# Patient Record
Sex: FEMALE | Race: WHITE
[De-identification: names, ages, dates, MRNs, and addresses within clinical notes are randomized per-mention and may not be internally consistent; named-entity substitution may affect disease eponyms.]

---

## 2017-03-21 ENCOUNTER — HOSPITAL ENCOUNTER (OUTPATIENT)
Dept: HOSPITAL 75 - ER | Age: 82
Setting detail: OBSERVATION
LOS: 2 days | Discharge: HOME | End: 2017-03-23
Attending: INTERNAL MEDICINE | Admitting: INTERNAL MEDICINE
Payer: MEDICARE

## 2017-03-21 VITALS — BODY MASS INDEX: 28.82 KG/M2 | HEIGHT: 65 IN | WEIGHT: 173 LBS

## 2017-03-21 VITALS — SYSTOLIC BLOOD PRESSURE: 129 MMHG | DIASTOLIC BLOOD PRESSURE: 70 MMHG

## 2017-03-21 VITALS — DIASTOLIC BLOOD PRESSURE: 57 MMHG | SYSTOLIC BLOOD PRESSURE: 115 MMHG

## 2017-03-21 VITALS — DIASTOLIC BLOOD PRESSURE: 65 MMHG | SYSTOLIC BLOOD PRESSURE: 117 MMHG

## 2017-03-21 VITALS — DIASTOLIC BLOOD PRESSURE: 71 MMHG | SYSTOLIC BLOOD PRESSURE: 119 MMHG

## 2017-03-21 DIAGNOSIS — I10: ICD-10-CM

## 2017-03-21 DIAGNOSIS — E11.9: ICD-10-CM

## 2017-03-21 DIAGNOSIS — E86.0: ICD-10-CM

## 2017-03-21 DIAGNOSIS — J45.909: ICD-10-CM

## 2017-03-21 DIAGNOSIS — J21.9: Primary | ICD-10-CM

## 2017-03-21 DIAGNOSIS — K21.9: ICD-10-CM

## 2017-03-21 DIAGNOSIS — R19.7: ICD-10-CM

## 2017-03-21 LAB
ALBUMIN SERPL-MCNC: 4.2 G/DL (ref 3.2–4.5)
ALT SERPL-CCNC: 14 U/L (ref 0–55)
ANION GAP SERPL CALC-SCNC: 16 MMOL/L (ref 5–14)
AST SERPL-CCNC: 17 U/L (ref 5–34)
BASOPHILS # BLD AUTO: 0 10^3/UL (ref 0–0.1)
BASOPHILS NFR BLD AUTO: 0 % (ref 0–10)
BILIRUB SERPL-MCNC: 0.1 MG/DL (ref 0.1–1)
BILIRUB UR QL STRIP: NEGATIVE
BUN SERPL-MCNC: 40 MG/DL (ref 7–18)
BUN/CREAT SERPL: 20
CALCIUM SERPL-MCNC: 9.4 MG/DL (ref 8.5–10.1)
CHLORIDE SERPL-SCNC: 109 MMOL/L (ref 98–107)
CO2 SERPL-SCNC: 12 MMOL/L (ref 21–32)
CREAT SERPL-MCNC: 1.98 MG/DL (ref 0.6–1.3)
EOSINOPHIL # BLD AUTO: 0.1 10^3/UL (ref 0–0.3)
EOSINOPHIL NFR BLD AUTO: 1 % (ref 0–10)
ERYTHROCYTE [DISTWIDTH] IN BLOOD BY AUTOMATED COUNT: 15.6 % (ref 10–14.5)
GFR SERPLBLD BASED ON 1.73 SQ M-ARVRAT: 24 ML/MIN
GLUCOSE SERPL-MCNC: 162 MG/DL (ref 70–105)
HYALINE CASTS #/AREA URNS LPF: (no result) /LPF
KETONES UR QL STRIP: NEGATIVE
LEUKOCYTE ESTERASE UR QL STRIP: (no result)
LYMPHOCYTES # BLD AUTO: 1 X 10^3 (ref 1–4)
LYMPHOCYTES NFR BLD AUTO: 9 % (ref 12–44)
MAGNESIUM SERPL-MCNC: 2.2 MG/DL (ref 1.8–2.4)
MCH RBC QN AUTO: 29 PG (ref 25–34)
MCHC RBC AUTO-ENTMCNC: 31 G/DL (ref 32–36)
MCV RBC AUTO: 93 FL (ref 80–99)
MONOCYTES # BLD AUTO: 0.9 X 10^3 (ref 0–1)
MONOCYTES NFR BLD AUTO: 8 % (ref 0–12)
NEUTROPHILS # BLD AUTO: 9.5 X 10^3 (ref 1.8–7.8)
NEUTROPHILS NFR BLD AUTO: 83 % (ref 42–75)
NITRITE UR QL STRIP: NEGATIVE
PH UR STRIP: 5 [PH] (ref 5–9)
PLATELET # BLD: 277 10^3/UL (ref 130–400)
PMV BLD AUTO: 10.2 FL (ref 7.4–10.4)
POTASSIUM SERPL-SCNC: 5.2 MMOL/L (ref 3.6–5)
PROT SERPL-MCNC: 8.1 G/DL (ref 6.4–8.2)
PROT UR QL STRIP: (no result)
RBC # BLD AUTO: 3.58 10^6/UL (ref 4.35–5.85)
SODIUM SERPL-SCNC: 137 MMOL/L (ref 135–145)
SP GR UR STRIP: 1.02 (ref 1.02–1.02)
SQUAMOUS #/AREA URNS HPF: (no result) /HPF
UROBILINOGEN UR-MCNC: NORMAL MG/DL
WBC # BLD AUTO: 11.4 10^3/UL (ref 4.3–11)
WBC #/AREA URNS HPF: (no result) /HPF

## 2017-03-21 PROCEDURE — 81000 URINALYSIS NONAUTO W/SCOPE: CPT

## 2017-03-21 PROCEDURE — 85025 COMPLETE CBC W/AUTO DIFF WBC: CPT

## 2017-03-21 PROCEDURE — 82962 GLUCOSE BLOOD TEST: CPT

## 2017-03-21 PROCEDURE — 83605 ASSAY OF LACTIC ACID: CPT

## 2017-03-21 PROCEDURE — 83735 ASSAY OF MAGNESIUM: CPT

## 2017-03-21 PROCEDURE — 96374 THER/PROPH/DIAG INJ IV PUSH: CPT

## 2017-03-21 PROCEDURE — 94640 AIRWAY INHALATION TREATMENT: CPT

## 2017-03-21 PROCEDURE — 71010: CPT

## 2017-03-21 PROCEDURE — 80048 BASIC METABOLIC PNL TOTAL CA: CPT

## 2017-03-21 PROCEDURE — 36415 COLL VENOUS BLD VENIPUNCTURE: CPT

## 2017-03-21 PROCEDURE — 80053 COMPREHEN METABOLIC PANEL: CPT

## 2017-03-21 PROCEDURE — 94760 N-INVAS EAR/PLS OXIMETRY 1: CPT

## 2017-03-21 PROCEDURE — 87040 BLOOD CULTURE FOR BACTERIA: CPT

## 2017-03-21 RX ADMIN — INSULIN ASPART SCH UNIT: 100 INJECTION, SOLUTION INTRAVENOUS; SUBCUTANEOUS at 21:00

## 2017-03-21 RX ADMIN — PROMETHAZINE HYDROCHLORIDE AND CODEINE PHOSPHATE PRN ML: 10; 6.25 SOLUTION ORAL at 22:34

## 2017-03-21 RX ADMIN — SODIUM CHLORIDE SCH MLS/HR: 900 INJECTION, SOLUTION INTRAVENOUS at 15:10

## 2017-03-21 RX ADMIN — SODIUM CHLORIDE SCH MLS/HR: 900 INJECTION, SOLUTION INTRAVENOUS at 20:50

## 2017-03-21 RX ADMIN — SODIUM CHLORIDE SCH MLS/HR: 900 INJECTION, SOLUTION INTRAVENOUS at 08:13

## 2017-03-21 RX ADMIN — ACETAMINOPHEN PRN MG: 500 TABLET ORAL at 13:48

## 2017-03-21 RX ADMIN — ALPRAZOLAM SCH MG: 0.5 TABLET ORAL at 21:00

## 2017-03-21 NOTE — XMS REPORT
[facility]

 Created on: 2016



Richi Sandrita

External Reference #: [id number]

: 1934

Sex: Female



Demographics







 Address  [address] [place]  [address]

 

 Home Phone  [phone]

 

 Preferred Language  Unknown

 

 Marital Status  Unknown

 

 Nondenominational Affiliation  Unknown

 

 Race   or 

 

 Ethnic Group  Not  or 





Author







 Author  YULISA RANGEL

 

 Organization  [employer]

 

 Address  Unknown

 

 Phone  Unavailable







Care Team Providers







 Care Team Member Name  Role  Phone

 

 YULISA RANGEL  CP  Unavailable



                                                                



Allergies

          No Known Allergies                                                   
                                     



Problems

          





 Problem Type  Condition  Code  Onset Dates  Condition Status

 

 Problem  Essential hypertension  I10     Active

 

 Problem  Arthritis  M19.90     Active

 

 Problem  Type 2 diabetes mellitus without complication, without long-term 
current use of insulin  E11.9     Active

 

 Problem  Lumbago with sciatica, left side  M54.42     Active

 

 Problem  Anxiety  F41.9     Active



                                                                               
                                                 



Medications

          





 Medication  Code System  Code  Instructions  Start Date  End Date  Status  
Dosage

 

 Alprazolam  NDC  [address]-10  0.5 MG Orally Three times a day           1 
tablet



                                                                              



Results

          No Known Results                                                     
               



Summary Purpose

          [employer] Submission

## 2017-03-21 NOTE — DIAGNOSTIC IMAGING REPORT
EXAM: CHEST 1 VIEW, AP/PA ONLY



INDICATION: Cough and congestion.



COMPARISON: Chest radiograph 4/12/2015.



FINDINGS: Normal heart size and pulmonary vascularity. No focal

pulmonary opacity, pleural effusion or pneumothorax. Calcified

aorta.



IMPRESSION: No acute cardiopulmonary findings.



Dictated by:



Dictated on workstation # [id number]

## 2017-03-21 NOTE — ED GENERAL
General


Stated Complaint:  VOMITING,DIARRHEA,FEELS HOT ALL OVER


Source of Information:  Patient


Exam Limitations:  No Limitations





History of Present Illness


Time Seen by Provider:  03:00


Initial Comments


Here with report of cough for the last couple of days associated with body 

aches and runny nose.  Onset of diarrhea yesterday morning.  Seen by her 

provider yesterday and prescribed Cefdinir.  She has taken a dose of that.  

Overnight she had increasing cough and diarrhea.  Feels like her skin is on 

fire which is been going on since onset.  Denies blood in her stool.  She 

reports being hot but denies fever.  Reports weakness.


Timing/Duration:  2-3 Days


Severity:  Moderate


Associated Systoms:  No Chest Pain,  CoughNo Fever/Chills,  Nausea/VomitingNo 

Shortness of Air,  Weakness





Allergies and Home Medications


Allergies


Coded Allergies:  


     Diazepam (Unverified  Allergy, Intermediate, RASH, 11/19/10)





Home Medications


Alprazolam 0.5 Mg Tab.sr.24h 1 TAB PO TID (Reported) 


Aspirin 81 Mg Chew 81 MG PO DAILY (Reported) 


Benzonatate 100 Mg Capsule #30 1-2 EACH PO Q4-6HR PRN PRN COUGH 


   Prescribed by: FAISAL MILLER on 4/12/15 2030


Cefuroxime Axetil 500 Mg Tablet #20 1 EACH PO BID 


   Prescribed by: FAISAL MILLER on 4/11/15 1855


Glimepiride 4 Mg Tablet 4 MG PO (Reported) 


Hydrochlorothiazide 25 Mg Tab 25 MG PO DAILY (Reported) 


Lisinopril 5 Mg Tablet 5 MG PO DAILY (Reported) 


Metformin Hcl 500 Mg Tablet 1 EACH PO BID WITH MEALS (Reported) 


Metoprolol Succinate 25 Mg Tab 25 MG PO BID (Reported) 


Tramadol/Acetaminophen 1 Ea Tab 1 TAB PO QID (Reported) 





Constitutional:   see HPI


EENTM:   nose congestionNo throat pain


Respiratory:   coughNo short of breath


Cardiovascular:   no symptoms reported


Gastrointestinal:   diarrhea nausea vomiting


Genitourinary:   no symptoms reported


Musculoskeletal:   no symptoms reported


All Other Systems Reviewed


Negative Unless Noted:  Yes





Past Medical-Social-Family Hx


Patient Social History


Alcohol Use:  Denies Use


Recreational Drug Use:  No


Smoking Status:  Never a Smoker


Recent Foreign Travel:  No


Contact w/Someone Who Travel:  No





Immunizations Up To Date


Date of Pneumonia Vaccine:  Oct 1, 2009





Surgeries


HX Surgeries:  Yes (SKIN CA REMOVAL)


Surgeries:  Eye Surgery, Hysterectomy





Respiratory


Hx Respiratory Disorders:  No





Cardiovascular


Hx Cardiac Disorders:  Yes


Cardiac Disorders:  Hypertension





Neurological


Hx Neurological Disorders:  No





Genitourinary


Hx Genitourinary Disorders:  No





Gastrointestinal


Hx Gastrointestinal Disorders:  No





Musculoskeletal


Hx Musculoskeletal Disorders:  No





Endocrine


Hx Endocrine Disorders:  Yes


Endocrine Disorders:  Diabetes, Non-Insulin dep





HEENT


HX ENT Disorders:  Yes


HEENT Disorders:  Cataract, Glaucoma





Cancer


Hx Cancer:  Yes


Cancer:  Skin





Psychosocial


Hx Psychiatric Problems:  No





Integumentary


HX Skin/Integumentary Disorder:  No





Blood Transfusions


Hx Blood Disorders:  No


Adverse Reaction to a Blood Tr:  No





Reviewed Nursing Assessment


Reviewed/Agree w Nursing PMH:  Yes





Family Medical History


Significant Family History:  No Pertinent Family Hx





Physical Exam


Vital Signs





 Vital Sign - Last 12Hours








 3/21/17





 02:58


 


Temp 97.6


 


Pulse 95


 


Resp 20


 


B/P 114/36


 


Pulse Ox 96


 


O2 Delivery Room Air





Capillary Refill :


General Appearance:   No Apparent Distress WD/WN


HEENT:   PERRL/EOMI Pharynx Normal


Neck:   Non Tender Supple


Respiratory:   No Accessory Muscle Use Other (course cough with sputum)


Cardiovascular:   Regular Rate, Rhythm No Murmur


Gastrointestinal:   Soft Tenderness (mild diffuse)


Back:   Normal Inspection No CVA Tenderness No Vertebral Tenderness


Extremity:   Normal Range of Motion Non Tender No Calf Tenderness


Neurologic/Psychiatric:   Alert Oriented x3 No Motor/Sensory Deficits


Skin:   Normal Color Warm/Dry





Focused Exam


Lactic Acid Level





 Laboratory Tests








Test


  3/21/17


03:44


 


Alanine Aminotransferase


(ALT/SGPT) 14U/L (0-55)  


 


 


Albumin


  4.2G/DL


(3.2-4.5)


 


Alkaline Phosphatase


  48U/L ()


 


 


Anion Gap


  16MMOL/L


(5-14)  H


 


Aspartate Amino Transf


(AST/SGOT) 17U/L (5-34)  


 


 


BUN/Creatinine Ratio 20  


 


Blood Urea Nitrogen


  40MG/DL (7-18)


H


 


Calcium Level


  9.4MG/DL


(8.5-10.1)


 


Carbon Dioxide Level


  12MMOL/L


(21-32)  L


 


Chloride Level


  109MMOL/L


()  H


 


Creatinine


  1.98MG/DL


(0.60-1.30)  H


 


Estimat Glomerular Filtration


Rate 24  


 


 


Glucose Level


  162MG/DL


()  H


 


Lactic Acid Level


  2.48MMOL/L


(0.50-2.00)  *H


 


Magnesium Level


  2.2MG/DL


(1.8-2.4)


 


Potassium Level


  5.2MMOL/L


(3.6-5.0)  H


 


Sodium Level


  137MMOL/L


(135-145)


 


Total Bilirubin


  0.1MG/DL


(0.1-1.0)


 


Total Protein


  8.1G/DL


(6.4-8.2)











Progress/Results/Core Measures


Results/Orders


Lab Results





 Laboratory Tests








Test


  3/21/17


03:44 3/21/17


04:26 Range/Units


 


 


Alanine Aminotransferase


(ALT/SGPT) 14 


  


  0-55  U/L


 


 


Albumin 4.2   3.2-4.5  G/DL


 


Alkaline Phosphatase 48     U/L


 


Anion Gap 16 H  5-14  MMOL/L


 


Aspartate Amino Transf


(AST/SGOT) 17 


  


  5-34  U/L


 


 


BUN/Creatinine Ratio 20    


 


Basophils # (Auto)


  0.0 


  


  0.0-0.1


10^3/uL


 


Basophils (%) (Auto) 0   0-10  %


 


Blood Urea Nitrogen 40 H  7-18  MG/DL


 


Calcium Level 9.4   8.5-10.1  MG/DL


 


Carbon Dioxide Level 12 L  21-32  MMOL/L


 


Chloride Level 109 H    MMOL/L


 


Creatinine


  1.98 H


  


  0.60-1.30


MG/DL


 


Eosinophils # (Auto)


  0.1 


  


  0.0-0.3


10^3/uL


 


Eosinophils (%) (Auto) 1   0-10  %


 


Estimat Glomerular Filtration


Rate 24 


  


   


 


 


Glucose Level 162 H    MG/DL


 


Hematocrit 33 L  35-52  %


 


Hemoglobin 10.3 L  11.5-16.0  G/DL


 


Lactic Acid Level


  2.48 *H


  


  0.50-2.00


MMOL/L


 


Lymphocytes # (Auto) 1.0   1.0-4.0  X 10^3


 


Lymphocytes (%) (Auto) 9 L  12-44  %


 


Magnesium Level 2.2   1.8-2.4  MG/DL


 


Mean Corpuscular Hemoglobin 29   25-34  PG


 


Mean Corpuscular Hemoglobin


Concent 31 L


  


  32-36  G/DL


 


 


Mean Corpuscular Volume 93   80-99  FL


 


Mean Platelet Volume 10.2   7.4-10.4  FL


 


Monocytes # (Auto) 0.9   0.0-1.0  X 10^3


 


Monocytes (%) (Auto) 8   0-12  %


 


Neutrophils # (Auto) 9.5 H  1.8-7.8  X 10^3


 


Neutrophils (%) (Auto) 83 H  42-75  %


 


Platelet Count


  277 


  


  130-400


10^3/uL


 


Potassium Level 5.2 H  3.6-5.0  MMOL/L


 


Red Blood Count


  3.58 L


  


  4.35-5.85


10^6/uL


 


Red Cell Distribution Width 15.6 H  10.0-14.5  %


 


Sodium Level 137   135-145  MMOL/L


 


Total Bilirubin 0.1   0.1-1.0  MG/DL


 


Total Protein 8.1   6.4-8.2  G/DL


 


White Blood Count


  11.4 H


  


  4.3-11.0


10^3/uL


 


Urine Bacteria  TRACE   /HPF


 


Urine Bilirubin  NEGATIVE  NEGATIVE  


 


Urine Casts  PRESENT   /LPF


 


Urine Clarity  CLEAR   


 


Urine Color  YELLOW   


 


Urine Crystals  NONE   /LPF


 


Urine Culture Indicated  NO   


 


Urine Glucose (UA)  NEGATIVE  NEGATIVE  


 


Urine Hyaline Casts  5-10 H  /LPF


 


Urine Ketones  NEGATIVE  NEGATIVE  


 


Urine Leukocyte Esterase  1+ H NEGATIVE  


 


Urine Mucus  MODERATE H  /LPF


 


Urine Nitrite  NEGATIVE  NEGATIVE  


 


Urine Protein  2+ H NEGATIVE  


 


Urine RBC  0-2   /HPF


 


Urine RBC (Auto)  1+ H NEGATIVE  


 


Urine Specific Gravity  1.020  1.016-1.022  


 


Urine Squamous Epithelial


Cells 


  0-2 


   /HPF


 


 


Urine Urobilinogen  NORMAL  NORMAL  MG/DL


 


Urine WBC  RARE   /HPF


 


Urine pH  5  5-9  








My Orders





 Orders-SHERIN LINTON MD


Cbc With Automated Diff (3/21/17 03:17)


Comprehensive Metabolic Panel (3/21/17 03:17)


Magnesium (3/21/17 03:17)


Ua Culture If Indicated (3/21/17 03:17)


Ondansetron Injection (Zofran Injectio (3/21/17 03:30)


Ns Iv 1000 Ml (Sodium Chloride 0.9%) (3/21/17 03:17)


Saline Lock/Iv-Start (3/21/17 03:17)


Lactic Acid Analyzer (3/21/17 03:17)


Blood Culture (3/21/17 03:17)


Chest 1 View, Ap/Pa Only (3/21/17 04:02)


Promethazine/ Codeine Syrup (Phenergan W (3/21/17 05:15)





Medications Given in ED





 Current Medications








 Medications  Dose


 Ordered  Sig/Fred


 Route  Start Time


 Stop Time Status Last Admin


Dose Admin


 


 Ondansetron HCl  4 mg  ONCE  ONCE


 IVP  3/21/17 03:30


 3/21/17 03:31 DC 3/21/17 03:34


4 MG








Vital Signs/I&O





 Vital Sign - Last 12Hours








 3/21/17





 02:58


 


Temp 97.6


 


Pulse 95


 


Resp 20


 


B/P 114/36


 


Pulse Ox 96


 


O2 Delivery Room Air








Progress Note :  


Progress Note


Seen and evaluated.  IV, labs, UA, chest x-ray, normal saline 1 L bolus and 

Zofran 4 mg IV ordered.  Monitor patient.  0500: Labs, x-ray and UA are 

reviewed.  Patient does have elevated lactic acid but there is no obvious 

source of infection.  Chest x-ray is negative and UA is negative.  She does 

have diarrhea and cough.  I do believe the lactic acid is related to volume 

depletion/dehydration from diarrhea and not sepsis from bacterial infection.  

Likely viral bronchitis and viral diarrhea.  This was discussed with Dr. OSBORNE 

who agrees.  She has had 1 L of normal saline and we will continue this 

inpatient with normal saline at 150 mL an hour.  Findings concerns discussed 

with patient and family who agree with plan.  Admit, observation status.  

Phenergan with codeine cough syrup 1 teaspoon by mouth given.





Diagnostic Imaging





   Diagonstic Imaging:  Xray


   Plain Films/CT/US/NM/MRI:  chest


Comments


No acute findings





Departure


Communication


Time/Spoke to Admitting Phy:  05:00





Impression


Impression:  


 Primary Impression:  


 Diarrhea


 Qualified Code:  R19.7 - Diarrhea, unspecified


 Additional Impressions:  


 Bronchitis


 Volume depletion


Disposition:  09 ADMITTED AS INPATIENT


Condition:  Stable


Decision to Admit Reason:  Admit from ER (General)


Decision to Admit/Date:  Mar 21, 2017


Time/Decision to Admit Time:  05:00





Departure-Patient Inst.


Referrals:  


[facility] (PCP/Family)


Primary Care Physician








SHERIN LINTON MD Mar 21, 2017 03:28

## 2017-03-21 NOTE — HISTORY & PHYSICAL-HOSPITALIST
HPI


History of Present Illness:


HPI/Chief Complaint


Mrs. Billy is an 82-year who reported a three-day history of cough and 

congestion.  This then progre to watery diarrhea without reported bright red 

blood or diarrhea. she saw her doctor who prescribed Cefdinir.following this 

she felt as though her skin was on fire with increasing weakness and shortness 

of breath.subsequently presented to the emergency room.  She denied shaking 

chills or fever and denied Sputem production.


Date Seen


3/21/17


Attending Physician


Giovanni Romero MD


PCP


[facility]


Referring Physician





Date of Admission


Mar 21, 2017 at 05:00





Home Medications & Allergies


Home Medications


Reviewed patient Home Medication Reconciliation Form





Allergies





 Allergies


Coded Allergies


  diazepam (Unverified Allergy, Intermediate, RASH, 11/19/10)








Past Medical-Social-Family Hx


Patient Social History


Alcohol Use:  Denies Use


Recreational Drug Use:  No


Smoking Status:  Never a Smoker


Physical Abuse Screen:  No


Sexual Abuse:  No


Recent Foreign Travel:  No


Contact w/other who traveled:  No


Recent Hopitalizations:  No


Recent Infectious Disease Expo:  No





Immunizations Up To Date


Date of Pneumonia Vaccine:  Oct 22, 2017





Seasonal Allergies


Seasonal Allergies:  No





Surgeries


HX Surgeries:  Yes (SKIN CA REMOVAL)


Surgeries:  Eye Surgery, Hysterectomy





Respiratory


Hx Respiratory Disorders:  No





Cardiovascular


Hx Cardiovascular Disorders:  Yes


Cardiac Disorders:  Hypertension





Neurological


Hx Neurological Disorders:  No





Genitourinary


Hx Genitourinary Disorders:  No





Gastrointestinal


Hx Gastrointestinal Disorders:  No


Gastrointestinal Disorders:  Gastroesophageal Reflux





Musculoskeletal


Hx Musculoskeletal Disorders:  No





Endocrine


Hx Endocrine Disorders:  Yes


Endocrine Disorders:  Diabetes, Non-Insulin dep





HEENT


HX ENT Disorders:  Yes


HEENT Disorders:  Cataract, Glaucoma





Cancer


Hx Cancer:  Yes


Cancer:  Skin





Psychosocial


Hx Psychiatric Problems:  No





Integumentary


HX Skin/Integumentary Disorder:  No





Blood Transfusions


Hx Blood Disorders:  No


Adverse Reaction to a Blood Tr:  No





Reviewed Nursing Assessment


Reviewed/Agree w Nursing PMH:  Yes





Family Medical History


Significant Family History:  No Pertinent Family Hx


Family Hx:  


Patient reports no known family medical history.





Review of Systems


Constitutional:  No chills, No diaphoresis, No dizziness, No fever,  weakness


Respiratory:   cough dyspnea on exertion short of breath wheezing


Gastrointestinal:   abdominal pain (And left upper quadrant from coughing) 

diarrhea loss of appetite





Physical Exam


Physical Exam


Vital Signs





 Vital Sign - Last 12Hours








 3/21/17





 02:58


 


Temp 97.6


 


Pulse 95


 


Resp 20


 


B/P 114/36


 


Pulse Ox 96


 


O2 Delivery Room Air





Capillary Refill : Less Than 3 Seconds


General Appearance:   Anxious Chronically ill Mild Distress


HEENT:   Pale Conjunctivae (L) Pale Conjunctivae (R) Pharyngeal Erythema


Respiratory:   No Accessory Muscle Use Wheezing (Mild expiratory  heard 

posteriorly anterior chest was clear no rales or rhonchi were noted.)


Cardiovascular:   Regular Rate, Rhythm No Edema No Gallop No JVD No Murmur 

Normal Peripheral Pulses


Gastrointestinal:   Normal Bowel Sounds No Organomegaly No Pulsatile Mass Soft 

Other (Mild epigastricTenderness no rebound or guarding)


Extremity:   Non Tender No Calf Tenderness No Pedal Edema


Skin:   Pallor





Results


Results/Procedures


Lab


Laboratory Tests


3/21/17 03:44














Assessment/Plan


Admission Diagnosis


1.  Likely viral bronchitis with reactive airways in addition to viral 

gastroenteritis with mild dehydration.  Bronchodilator therapy and IV hydration 

have been initiated.


2.  Type II diabetes  mellitus we will hold oral therapy for now and monitor.


3.  Hypertension hold antihypertensive medication and resume when blood 

pressure becomes elevated..





Clinical Quality Measures


DVT/VTE Risk/Contraindication:


Risk Factor Score Per Nursin


RFS Level Per Nursing on Admit:  4+=Very High








ADITYA STAUFFER MD Mar 21, 2017 20:13

## 2017-03-22 VITALS — DIASTOLIC BLOOD PRESSURE: 69 MMHG | SYSTOLIC BLOOD PRESSURE: 126 MMHG

## 2017-03-22 VITALS — SYSTOLIC BLOOD PRESSURE: 100 MMHG | DIASTOLIC BLOOD PRESSURE: 59 MMHG

## 2017-03-22 VITALS — DIASTOLIC BLOOD PRESSURE: 67 MMHG | SYSTOLIC BLOOD PRESSURE: 122 MMHG

## 2017-03-22 VITALS — SYSTOLIC BLOOD PRESSURE: 137 MMHG | DIASTOLIC BLOOD PRESSURE: 70 MMHG

## 2017-03-22 VITALS — DIASTOLIC BLOOD PRESSURE: 65 MMHG | SYSTOLIC BLOOD PRESSURE: 122 MMHG

## 2017-03-22 VITALS — SYSTOLIC BLOOD PRESSURE: 146 MMHG | DIASTOLIC BLOOD PRESSURE: 66 MMHG

## 2017-03-22 VITALS — DIASTOLIC BLOOD PRESSURE: 66 MMHG | SYSTOLIC BLOOD PRESSURE: 121 MMHG

## 2017-03-22 LAB
ANION GAP SERPL CALC-SCNC: 7 MMOL/L (ref 5–14)
BASOPHILS # BLD AUTO: 0 10^3/UL (ref 0–0.1)
BASOPHILS NFR BLD AUTO: 0 % (ref 0–10)
BUN SERPL-MCNC: 26 MG/DL (ref 7–18)
BUN/CREAT SERPL: 19
CALCIUM SERPL-MCNC: 7.7 MG/DL (ref 8.5–10.1)
CHLORIDE SERPL-SCNC: 116 MMOL/L (ref 98–107)
CO2 SERPL-SCNC: 18 MMOL/L (ref 21–32)
CREAT SERPL-MCNC: 1.34 MG/DL (ref 0.6–1.3)
EOSINOPHIL # BLD AUTO: 0.1 10^3/UL (ref 0–0.3)
EOSINOPHIL NFR BLD AUTO: 1 % (ref 0–10)
ERYTHROCYTE [DISTWIDTH] IN BLOOD BY AUTOMATED COUNT: 15.7 % (ref 10–14.5)
GFR SERPLBLD BASED ON 1.73 SQ M-ARVRAT: 38 ML/MIN
GLUCOSE SERPL-MCNC: 159 MG/DL (ref 70–105)
LYMPHOCYTES # BLD AUTO: 2 X 10^3 (ref 1–4)
LYMPHOCYTES NFR BLD AUTO: 23 % (ref 12–44)
MCH RBC QN AUTO: 29 PG (ref 25–34)
MCHC RBC AUTO-ENTMCNC: 32 G/DL (ref 32–36)
MCV RBC AUTO: 93 FL (ref 80–99)
MONOCYTES # BLD AUTO: 0.6 X 10^3 (ref 0–1)
MONOCYTES NFR BLD AUTO: 7 % (ref 0–12)
NEUTROPHILS # BLD AUTO: 6 X 10^3 (ref 1.8–7.8)
NEUTROPHILS NFR BLD AUTO: 69 % (ref 42–75)
PLATELET # BLD: 223 10^3/UL (ref 130–400)
PMV BLD AUTO: 9.7 FL (ref 7.4–10.4)
POTASSIUM SERPL-SCNC: 4.2 MMOL/L (ref 3.6–5)
RBC # BLD AUTO: 2.91 10^6/UL (ref 4.35–5.85)
SODIUM SERPL-SCNC: 141 MMOL/L (ref 135–145)
WBC # BLD AUTO: 8.7 10^3/UL (ref 4.3–11)

## 2017-03-22 RX ADMIN — PANTOPRAZOLE SODIUM SCH MG: 40 TABLET, DELAYED RELEASE ORAL at 06:52

## 2017-03-22 RX ADMIN — SODIUM CHLORIDE SCH MLS/HR: 900 INJECTION, SOLUTION INTRAVENOUS at 14:28

## 2017-03-22 RX ADMIN — INSULIN ASPART SCH UNIT: 100 INJECTION, SOLUTION INTRAVENOUS; SUBCUTANEOUS at 16:00

## 2017-03-22 RX ADMIN — IPRATROPIUM BROMIDE AND ALBUTEROL SULFATE SCH ML: .5; 3 SOLUTION RESPIRATORY (INHALATION) at 11:01

## 2017-03-22 RX ADMIN — IPRATROPIUM BROMIDE AND ALBUTEROL SULFATE SCH ML: .5; 3 SOLUTION RESPIRATORY (INHALATION) at 06:51

## 2017-03-22 RX ADMIN — INSULIN ASPART SCH UNIT: 100 INJECTION, SOLUTION INTRAVENOUS; SUBCUTANEOUS at 20:32

## 2017-03-22 RX ADMIN — SODIUM CHLORIDE SCH MLS/HR: 900 INJECTION, SOLUTION INTRAVENOUS at 06:47

## 2017-03-22 RX ADMIN — SODIUM CHLORIDE SCH MLS/HR: 900 INJECTION, SOLUTION INTRAVENOUS at 00:15

## 2017-03-22 RX ADMIN — PROMETHAZINE HYDROCHLORIDE AND CODEINE PHOSPHATE PRN ML: 10; 6.25 SOLUTION ORAL at 15:08

## 2017-03-22 RX ADMIN — ALPRAZOLAM SCH MG: 0.5 TABLET ORAL at 20:01

## 2017-03-22 RX ADMIN — ALPRAZOLAM SCH MG: 0.5 TABLET ORAL at 11:23

## 2017-03-22 RX ADMIN — INSULIN ASPART SCH UNIT: 100 INJECTION, SOLUTION INTRAVENOUS; SUBCUTANEOUS at 05:23

## 2017-03-22 RX ADMIN — ALPRAZOLAM SCH MG: 0.5 TABLET ORAL at 09:00

## 2017-03-22 RX ADMIN — INSULIN ASPART SCH UNIT: 100 INJECTION, SOLUTION INTRAVENOUS; SUBCUTANEOUS at 11:20

## 2017-03-22 RX ADMIN — IPRATROPIUM BROMIDE AND ALBUTEROL SULFATE SCH ML: .5; 3 SOLUTION RESPIRATORY (INHALATION) at 15:31

## 2017-03-22 RX ADMIN — IPRATROPIUM BROMIDE AND ALBUTEROL SULFATE SCH ML: .5; 3 SOLUTION RESPIRATORY (INHALATION) at 18:55

## 2017-03-22 NOTE — PROGRESS NOTE-HOSPITALIST
Subjective


HPI/CC On Admission


Mrs. Billy is an 82-year who reported a three-day history of cough and 

congestion.  This then progre to watery diarrhea without reported bright red 

blood or diarrhea. she saw her doctor who prescribed Cefdinir.following this 

she felt as though her skin was on fire with increasing weakness and shortness 

of breath.subsequently presented to the emergency room.  She denied shaking 

chills or fever and denied Sputem production.


Date Seen


3/22/17


Subjective/Events-last exam


After breathing rx last night feeling better with decreased sob. No chill fever 

or diarrhea reported.





Objective


Exam


Vital Signs





Vital Sign - Last 12Hours








 3/20/17 3/21/17





 21:20 02:58


 


Temp  97.6


 


Pulse  95


 


Resp  20


 


B/P (MAP)  114/36


 


Pulse Ox 95 


 


O2 Delivery Room Air 





Capillary Refill : Less Than 3 Seconds


General Appearance:  No Apparent Distress, Chronically ill


Respiratory:  Chest Non Tender, No Accessory Muscle Use, No Respiratory Distress

, Wheezing (mild expiratory )


Cardiovascular:  Regular Rate, Rhythm, No Edema, No Gallop, No JVD, No Murmur, 

Normal Peripheral Pulses


Extremity:  No Pedal Edema





Results/Procedures


Lab








Assessment/Plan


Assessment and Plan


Assess & Plan/Chief Complaint


1.  Acute bronchiolitis likely due to viral infection improving patient set up 

for discharge later today she was codeine-based cough syrup which is working 

well for the past without side effects.











ADITYA STAUFFER MD Mar 22, 2017 20:46

## 2017-03-23 VITALS — SYSTOLIC BLOOD PRESSURE: 130 MMHG | DIASTOLIC BLOOD PRESSURE: 60 MMHG

## 2017-03-23 VITALS — SYSTOLIC BLOOD PRESSURE: 144 MMHG | DIASTOLIC BLOOD PRESSURE: 77 MMHG

## 2017-03-23 VITALS — DIASTOLIC BLOOD PRESSURE: 88 MMHG | SYSTOLIC BLOOD PRESSURE: 122 MMHG

## 2017-03-23 RX ADMIN — IPRATROPIUM BROMIDE AND ALBUTEROL SULFATE SCH ML: .5; 3 SOLUTION RESPIRATORY (INHALATION) at 11:23

## 2017-03-23 RX ADMIN — PROMETHAZINE HYDROCHLORIDE AND CODEINE PHOSPHATE PRN ML: 10; 6.25 SOLUTION ORAL at 06:07

## 2017-03-23 RX ADMIN — ALPRAZOLAM SCH MG: 0.5 TABLET ORAL at 09:24

## 2017-03-23 RX ADMIN — INSULIN ASPART SCH UNIT: 100 INJECTION, SOLUTION INTRAVENOUS; SUBCUTANEOUS at 06:00

## 2017-03-23 RX ADMIN — IPRATROPIUM BROMIDE AND ALBUTEROL SULFATE SCH ML: .5; 3 SOLUTION RESPIRATORY (INHALATION) at 07:38

## 2017-03-23 RX ADMIN — INSULIN ASPART SCH UNIT: 100 INJECTION, SOLUTION INTRAVENOUS; SUBCUTANEOUS at 16:00

## 2017-03-23 RX ADMIN — INSULIN ASPART SCH UNIT: 100 INJECTION, SOLUTION INTRAVENOUS; SUBCUTANEOUS at 10:54

## 2017-03-23 RX ADMIN — ALPRAZOLAM SCH MG: 0.5 TABLET ORAL at 12:23

## 2017-03-23 RX ADMIN — PANTOPRAZOLE SODIUM SCH MG: 40 TABLET, DELAYED RELEASE ORAL at 06:08

## 2017-03-23 RX ADMIN — ACETAMINOPHEN PRN MG: 500 TABLET ORAL at 07:41

## 2017-03-23 NOTE — DISCHARGE SUMMARY-HOSPITALIST
Diagnosis/Chief Complaint


Date of Admission


Mar 21, 2017 at 07:00


Date of Discharge





Discharge Date:  Mar 23, 2017


Admission Diagnosis


1.  Likely viral bronchitis with reactive airways in addition to viral 

gastroenteritis with mild dehydration.  Bronchodilator therapy and IV hydration 

have been initiated.


2.  Type II diabetes  mellitus we will hold oral therapy for now and monitor.


3.  Hypertension hold antihypertensive medication and resume when blood 

pressure becomes elevated..





Discharge Diagnosis


1.  Acute bronchiolitis likely due to viral infection improving patient set up 

for discharge later today she was codeine-based cough syrup which is working 

well for the past without side effects.








Reason Hospital Visit/Course


Mrs. Stoddard is an 82-year who reported a three-day history of cough and 

congestion.  This then progre to watery diarrhea without reported bright red 

blood or diarrhea. she saw her doctor who prescribed Cefdinir.following this 

she felt as though her skin was on fire with increasing weakness and shortness 

of breath.subsequently presented to the emergency room.  She denied shaking 

chills or fever and denied Sputum production.  IV fluids and anti-medic therapy 

was initiated.  Patient was feeling better by the morning and creatinine level 

was down from the 1.8 range to 1.3.  Her hemoglobin with rehydration and also 

decreased from little over 10-8.5.  Stools remain light in color with 

resolution of diarrhea.  Appetite returned to normal.  Her cough persisted and 

she did request codeine-based cough syrup on discharge which she has tolerated 

in the past without nausea or constipation problems.  Despite return of good by 

mouth intake on a 1500-calorie ADA diet blood sugars remained predominantly in 

the 120-150 range off of metformin and glimepiride.  On her discharge metformin 

at 500 mg twice a day was resumed and she was told to hold her glimepiride.  

Other medications were resumed as per below in addition Robitussin-AC elixir 5-

10 mL every 4 when necessary.  She was advised to follow-up with Dr. Hsu in 1

-2 weeks and to repeat a CBC at that time.  Discussed the fact that she did 

have what appeared to be likely anemia of chronic disease.  If this is not 

resolving she will require further workup.  Discussed the importance of a 

balanced diet in layman's terms.





Discharge Summary


Discharge Physical Examination


Allergies:  


Coded Allergies:  


     diazepam (Unverified  Allergy, Intermediate, RASH, 11/19/10)


Vitals & I&Os





Vital Signs








  Date Time  Temp Pulse Resp B/P (MAP) Pulse Ox O2 Delivery O2 Flow Rate FiO2


 


3/23/17 16:00 96.9 94 16 130/60 97 Room Air  











Hospital Course


Labs (last 24 hrs)


Laboratory Tests


3/22/17 20:21: Glucometer 182H


3/23/17 05:12: Glucometer 141H


3/23/17 10:43: Glucometer 171H


3/23/17 15:58: Glucometer 132H





Microbiology


3/21/17 Blood Culture - Preliminary, Resulted


          No growth





Pending Labs


Laboratory Tests


3/23/17 10:43: Glucometer 171


3/23/17 15:58: Glucometer 132








Discharge


Home Medications:





Active Scripts


Active


[Robitussin Ac]   5-10 Ml PO 


Reported


Aspirin EC (Aspirin) 81 Mg Tablet.dr 81 Mg PO DAILY


Tramadol-Acetaminophn 37.5-325 (Tramadol HCl/Acetaminophen) 1 Each Tablet 1 Tab 

PO QID


Nexium (Esomeprazole Magnesium) 40 Mg Cap 40 Mg PO DAILY


Alprazolam 0.5 Mg Tablet 0.5 Mg PO TID


Metformin HCl 500 Mg Tablet 500 Mg PO BID WITH MEALS


Metoprolol Tartrate 25 Mg Tablet 25 Mg PO BID


Lisinopril 5 Mg Tablet 5 Mg PO DAILY





Instructions to patient/family


Please see electonic discharge instructions given to patient.





Clinical Quality Measures


DVT/VTE Risk/Contraindication:


Risk Factor Score Per Nursin


RFS Level Per Nursing on Admit:  4+=Very High





Copy


Copies To 1:   YULISA RANGEL MD, MARK D MD Mar 23, 2017 17:19

## 2019-01-28 ENCOUNTER — HOSPITAL ENCOUNTER (EMERGENCY)
Dept: HOSPITAL 75 - ER | Age: 84
Discharge: HOME | End: 2019-01-28
Payer: MEDICARE

## 2019-01-28 VITALS — WEIGHT: 160 LBS | BODY MASS INDEX: 26.66 KG/M2 | HEIGHT: 65 IN

## 2019-01-28 VITALS — SYSTOLIC BLOOD PRESSURE: 110 MMHG | DIASTOLIC BLOOD PRESSURE: 65 MMHG
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
## 2019-01-28 DIAGNOSIS — Z90.710: ICD-10-CM

## 2019-01-28 DIAGNOSIS — Z79.82: ICD-10-CM

## 2019-01-28 DIAGNOSIS — Z79.84: ICD-10-CM

## 2019-01-28 DIAGNOSIS — R40.2362: ICD-10-CM

## 2019-01-28 DIAGNOSIS — I10: ICD-10-CM

## 2019-01-28 DIAGNOSIS — M25.561: Primary | ICD-10-CM

## 2019-01-28 DIAGNOSIS — R40.2252: ICD-10-CM

## 2019-01-28 DIAGNOSIS — E11.9: ICD-10-CM

## 2019-01-28 DIAGNOSIS — K21.9: ICD-10-CM

## 2019-01-28 DIAGNOSIS — R40.2142: ICD-10-CM

## 2019-01-28 DIAGNOSIS — M25.562: ICD-10-CM

## 2019-01-28 DIAGNOSIS — Z88.8: ICD-10-CM
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
## 2019-01-28 DIAGNOSIS — Z85.828: ICD-10-CM

## 2019-01-28 PROCEDURE — 70450 CT HEAD/BRAIN W/O DYE: CPT

## 2019-01-28 NOTE — ED FALL/INJURY
General


Stated Complaint:  FELL OUT OF WHEELCHAIR


Source:  patient


Exam Limitations:  no limitations





History of Present Illness


Date Seen by Provider:  2019


Time Seen by Provider:  06:38


Initial Comments


Patient presents to ER by wheelchair from 4th floor where she has been with her 

 overnight. She fell forward out of her wheelchair after falling asleep 

on to her knees and struck her head against the recliner. Patient says she has 

falls about every week or so. She can walk but usually uses a wheelchair 

because of the arthritis in her back. She's having some modest pain but no more 

than usual in her knees and denies any swelling or loss of range of motion or 

ability to stand on them. She says she struck herself in the right forehead. 

She is having no double vision, headache, nausea, diarrhea, dysuria, fever. She 

says she just wants to be checked out. He does not take blood thinners or 

aspirin. She has already taken her medications this morning including a pain 

pill for her arthritis.





Allergies and Home Medications


Allergies


Coded Allergies:  


     diazepam (Unverified  Allergy, Intermediate, RASH, 11/19/10)





Home Medications


Alprazolam 0.5 Mg Tablet, 0.5 MG PO TID, (Reported)


Aspirin 81 Mg Tablet.dr, 81 MG PO DAILY, (Reported)


Esomeprazole Magnesium 40 Mg Cap, 40 MG PO DAILY, (Reported)


Lisinopril 5 Mg Tablet, 5 MG PO DAILY, (Reported)


Metformin HCl 500 Mg Tablet, 500 MG PO BID WITH MEALS, (Reported)


Metoprolol Tartrate 25 Mg Tablet, 25 MG PO BID, (Reported)


Tramadol HCl/Acetaminophen 1 Each Tablet, 1 TAB PO QID, (Reported)





Patient Home Medication List


Home Medication List Reviewed:  Yes





Review of Systems


Review of Systems


Constitutional:  No chills, No fever


Eyes:  Denies Blindness, Denies Blurred Vision


Ears, Nose, Mouth, Throat:  denies ear pain, denies ear discharge


Respiratory:  No cough, No short of breath


Cardiovascular:  No chest pain, No edema


Gastrointestinal:  No abdominal pain, No constipation, No diarrhea, No nausea


Genitourinary:  No discharge, No dysuria





Past Medical-Social-Family Hx


Patient Social History


Alcohol Use:  Denies Use


Recreational Drug Use:  No


Smoking Status:  Never a Smoker


Recent Foreign Travel:  No


Contact w/Someone Who Travel:  No


Recent Hopitalizations:  No





Immunizations Up To Date


Date of Pneumonia Vaccine:  Oct 22, 2017





Seasonal Allergies


Seasonal Allergies:  No





Past Medical History


Surgeries:  Yes (SKIN CA REMOVAL)


Eye Surgery, Hysterectomy


Respiratory:  No


Cardiac:  Yes


Hypertension


Neurological:  No


Genitourinary:  No


Gastrointestinal:  Yes


Gastroesophageal Reflux


Musculoskeletal:  No


Endocrine:  Yes


Diabetes, Non-Insulin dep


Cataract, Glaucoma


Cancer:  Yes


Skin


Psychosocial:  No


Integumentary:  No


Blood Disorders:  No


Adverse Reaction/Blood Tranf:  No





Family Medical History





Patient reports no known family medical history.


No Pertinent Family Hx





Physical Exam


Vital Signs





Vital Signs - First Documented








 19





 06:40


 


Temp 96.1


 


Pulse 93


 


Resp 19


 


B/P (MAP) 111/66 (81)


 


Pulse Ox 98


 


O2 Delivery Room Air





Capillary Refill :


Height, Weight, BMI


Height: 5'5.00"


Weight: 173lbs. 0.0oz. 78.161324tw; 28.8 BMI


Method:Stated


General Appearance:  WD/WN, no apparent distress


HEENT:  PERRL/EOMI, normal ENT inspection, TMs normal, pharynx normal, other (

atraumatic head with negative for Martin sign, raccoon eyes or hematoma or 

hemotympanum)


Neck:  non-tender, full range of motion, supple, normal inspection


Cardiovascular:  normal peripheral pulses, regular rate, rhythm, no edema


Respiratory:  chest non-tender, lungs clear, normal breath sounds, no 

respiratory distress, no accessory muscle use


Peripheral Pulses:  2+ Dorsalis Pedis (R), 2+ Left Dors-Pedis (L)


Gastrointestinal:  normal bowel sounds, non tender, soft


Extremities:  normal range of motion (bilateral knees), non-tender, normal 

inspection, no pedal edema, normal capillary refill, other (no abrasion, 

erythema, ecchymoses, joint effusion, crepitus. She generally lacks about 10 

of full extension of both knees but this is felt to be chronic.)





Rikki Coma Score


Best Eye Response:  (4) Open Spontaneously


Best Verbal Response:  (5) Oriented


Best Motor Response:  (6) Obeys Commands


[place] Total:  15





Progress/Results/Core Measures


Results/Orders


My Orders





Orders - SHIN FREITAS


Ct Head Wo (19 06:48)


Knee, 3 Views, Bilateral (19 07:05)





Vital Signs/I&O











 19





 06:40 06:40


 


Temp 96.1 96.1


 


Pulse 93 93


 


Resp 


 


B/P (MAP) 111/66 (81) 111/66 (81)


 


Pulse Ox  98


 


O2 Delivery  Room Air











Progress


Progress Note :  


   Time:  06:53


Progress Note


Patient's knees and head are atraumatic appearing. She does have chronic 

degenerative changes with kyphosis. She states her pain is at baseline. She 

declines anything for pain right now. We have offered x-rays of the knee but 

she has full range of motion and can stand on them. We have also offered a CT 

of the head versus observation and the patient decided to go ahead and CT.





Diagnostic Imaging





   Diagonstic Imaging:  CT


   Plain Films/CT/US/NM/MRI:  head


Comments


 [facility] [place], [place]





NAME:   ARIANA BEJARANO Vestiage


[facility] REC#:   [id number]


ACCOUNT#:   [id number]


PT STATUS:   REG ER


:   1934


PHYSICIAN:   SHIN FREITAS MD


ADMIT DATE:   19/ER


 ***Draft***


Date of Exam:19





CT HEAD WO








PROCEDURE: CT head without contrast.





TECHNIQUE: Multiple contiguous axial images were obtained through


the brain without the use of intravenous contrast.





INDICATION:  Fall.





No priors.





There is no hemorrhage, hydrocephalus, edema, mass, mass effect


or evidence for an elevation of the intracranial pressures.


Orbits and calvarium nonacute. There is chronic-appearing,


partially calcified opacification of the incompletely visualized


right maxillary sinus and a similarly but less involved left


maxillary sinus noted.  There appears to be postsurgical changes


to the medial maxillary walls.





IMPRESSION:  





Chronic paranasal sinus disease, unremarkable appearance of the


brain.





  Dictated on workstation # [id number]








Dict:   19 0709


Trans:   19 0720


[facility] [id number]





Interpreted by:     LOBO HILLMAN


Electronically signed by:


   Reviewed:  Reviewed by Me








   Diagonstic Imaging:  Xray


   Plain Films/CT/US/NM/MRI:  knee (georgette)


Comments


NAME:   ARIANA BEJARANO


[facility] REC#:   [id number]


ACCOUNT#:   [id number]


PT STATUS:   REG ER


:   1934


PHYSICIAN:   SHIN FREITAS MD


ADMIT DATE:   19/ER


 ***Draft***


Date of Exam:19





KNEE, 3 VIEWS, BILATERAL








Indication: Fall with pain.





Bilateral three-view knee showed no fracture, dislocation loose


body or convincing evidence for joint effusion.





Impression:  No acute-appearing abnormality.





  Dictated on workstation # [id number]








Dict:   19 0736


Trans:   19 0740


[facility] [id number]





Interpreted by:     LOBO HILLMAN


Electronically signed by:


   Reviewed:  Reviewed by Me





Departure


Impression





 Primary Impression:  


 Fall


 Qualified Codes:  W19.XXXA - Unspecified fall, initial encounter


Disposition:   HOME, SELF-CARE


Condition:  Stable





Departure-Patient Inst.


Decision time for Depature:  07:40


Referrals:  


[facility] ([place]P/Family)


Primary Care Physician


Patient Instructions:  Concussion, Adult (DC)





Add. Discharge Instructions:  


Tylenol and/or ibuprofen as necessary for headache pain. 


If you Have a headache, nausea or difficulty with balance then you should get 

some sleep and follow-up with primary care as necessary.











SHIN FREITAS 2019 06:54

## 2019-01-28 NOTE — DIAGNOSTIC IMAGING REPORT
PROCEDURE: CT head without contrast.



TECHNIQUE: Multiple contiguous axial images were obtained through

the brain without the use of intravenous contrast.



INDICATION:  Fall.



No priors.



There is no hemorrhage, hydrocephalus, edema, mass, mass effect

or evidence for an elevation of the intracranial pressures.

Orbits and calvarium nonacute. There is chronic-appearing,

partially calcified opacification of the incompletely visualized

right maxillary sinus and a similarly but less involved left

maxillary sinus noted.  There appears to be postsurgical changes

to the medial maxillary walls.



IMPRESSION:  



Chronic paranasal sinus disease, unremarkable appearance of the

brain.



Dictated by: 



  Dictated on workstation # [id number]

## 2019-01-28 NOTE — DIAGNOSTIC IMAGING REPORT
Indication: Fall with pain.



Bilateral three-view knee showed no fracture, dislocation loose

body or convincing evidence for joint effusion.



Impression:  No acute-appearing abnormality.



Dictated by: 



  Dictated on workstation # [id number]

## 2019-02-08 ENCOUNTER — HOSPITAL ENCOUNTER (EMERGENCY)
Dept: HOSPITAL 75 - ER | Age: 84
Discharge: HOME | End: 2019-02-08
Payer: MEDICARE

## 2019-02-08 VITALS — SYSTOLIC BLOOD PRESSURE: 142 MMHG | DIASTOLIC BLOOD PRESSURE: 66 MMHG

## 2019-02-08 VITALS — WEIGHT: 160 LBS | HEIGHT: 65 IN | BODY MASS INDEX: 26.66 KG/M2

## 2019-02-08 DIAGNOSIS — Z88.8: ICD-10-CM

## 2019-02-08 DIAGNOSIS — I10: ICD-10-CM

## 2019-02-08 DIAGNOSIS — Z79.84: ICD-10-CM

## 2019-02-08 DIAGNOSIS — Z85.828: ICD-10-CM

## 2019-02-08 DIAGNOSIS — Z90.710: ICD-10-CM

## 2019-02-08 DIAGNOSIS — E11.9: ICD-10-CM

## 2019-02-08 DIAGNOSIS — K21.9: ICD-10-CM

## 2019-02-08 DIAGNOSIS — L03.116: Primary | ICD-10-CM

## 2019-02-08 DIAGNOSIS — Z79.82: ICD-10-CM

## 2019-02-08 LAB
ALBUMIN SERPL-MCNC: 3.9 GM/DL (ref 3.2–4.5)
ALP SERPL-CCNC: 50 U/L (ref 40–136)
ALT SERPL-CCNC: 9 U/L (ref 0–55)
BASOPHILS # BLD AUTO: 0 10^3/UL (ref 0–0.1)
BASOPHILS NFR BLD AUTO: 0 % (ref 0–10)
BASOPHILS NFR BLD MANUAL: 0 %
BILIRUB SERPL-MCNC: 0.4 MG/DL (ref 0.1–1)
BUN/CREAT SERPL: 20
CALCIUM SERPL-MCNC: 9.5 MG/DL (ref 8.5–10.1)
CHLORIDE SERPL-SCNC: 110 MMOL/L (ref 98–107)
CO2 SERPL-SCNC: 16 MMOL/L (ref 21–32)
CREAT SERPL-MCNC: 1.56 MG/DL (ref 0.6–1.3)
EOSINOPHIL # BLD AUTO: 0 10^3/UL (ref 0–0.3)
EOSINOPHIL NFR BLD AUTO: 0 % (ref 0–10)
EOSINOPHIL NFR BLD MANUAL: 0 %
ERYTHROCYTE [DISTWIDTH] IN BLOOD BY AUTOMATED COUNT: 14.5 % (ref 10–14.5)
GFR SERPLBLD BASED ON 1.73 SQ M-ARVRAT: 32 ML/MIN
GLUCOSE SERPL-MCNC: 167 MG/DL (ref 70–105)
HCT VFR BLD CALC: 33 % (ref 35–52)
HGB BLD-MCNC: 10.5 G/DL (ref 11.5–16)
LYMPHOCYTES # BLD AUTO: 1.6 X 10^3 (ref 1–4)
LYMPHOCYTES NFR BLD AUTO: 9 % (ref 12–44)
MANUAL DIFFERENTIAL PERFORMED BLD QL: YES
MCH RBC QN AUTO: 30 PG (ref 25–34)
MCHC RBC AUTO-ENTMCNC: 32 G/DL (ref 32–36)
MCV RBC AUTO: 96 FL (ref 80–99)
MONOCYTES # BLD AUTO: 1.3 X 10^3 (ref 0–1)
MONOCYTES NFR BLD AUTO: 7 % (ref 0–12)
MONOCYTES NFR BLD: 4 %
NEUTROPHILS # BLD AUTO: 14.7 X 10^3 (ref 1.8–7.8)
NEUTROPHILS NFR BLD AUTO: 84 % (ref 42–75)
NEUTS BAND NFR BLD MANUAL: 89 %
NEUTS BAND NFR BLD: 0 %
PLATELET # BLD: 204 10^3/UL (ref 130–400)
PMV BLD AUTO: 11 FL (ref 7.4–10.4)
POTASSIUM SERPL-SCNC: 4.4 MMOL/L (ref 3.6–5)
PROT SERPL-MCNC: 7.7 GM/DL (ref 6.4–8.2)
RBC MORPH BLD: NORMAL
SODIUM SERPL-SCNC: 138 MMOL/L (ref 135–145)
VARIANT LYMPHS NFR BLD MANUAL: 7 %
WBC # BLD AUTO: 17.6 10^3/UL (ref 4.3–11)

## 2019-02-08 PROCEDURE — 85007 BL SMEAR W/DIFF WBC COUNT: CPT

## 2019-02-08 PROCEDURE — 36415 COLL VENOUS BLD VENIPUNCTURE: CPT

## 2019-02-08 PROCEDURE — 80053 COMPREHEN METABOLIC PANEL: CPT

## 2019-02-08 PROCEDURE — 85027 COMPLETE CBC AUTOMATED: CPT

## 2019-02-08 NOTE — XMS REPORT
[facility]

 Created on: 2016



Sandrita Stoddard

External Reference #: [id number]

: 1934

Sex: Female



Demographics







 Address  [address] [place]  [address]

 

 Home Phone  [phone]

 

 Preferred Language  Unknown

 

 Marital Status  Unknown

 

 [employer] Affiliation  Unknown

 

 Race   or 

 

 Ethnic Group  Not  or 





Author







 Author  YULISA RANGEL

 

 Organization  [employer]

 

 Address  Unknown

 

 Phone  Unavailable







Care Team Providers







 Care Team Member Name  Role  Phone

 

 YULISA RANGEL    Unavailable



                                                                



Allergies

          No Known Allergies                                                   
                                     



Problems

          





 Problem Type  Condition  Code  Onset Dates  Condition Status

 

 Problem  Essential hypertension  I10     Active

 

 Problem  Arthritis  M19.90     Active

 

 Problem  Type 2 diabetes mellitus without complication, without long-term 
current use of insulin  E11.9     Active

 

 Assessment  Essential hypertension  I10     Active

 

 Problem  Lumbago with sciatica, left side  M54.42     Active

 

 Problem  Anxiety  F41.9     Active



                                                                               
                                                           



Medications

          





 Medication  Code System  Code  Instructions  Start Date  End Date  Status  
Dosage

 

 Metoprolol Tartrate  NDC  [address]-17  25 MG Orally Twice a day           1 
tablet with food



                                                                              



Results

          No Known Results                                                     
               



Summary Purpose

          [employer] Submission

## 2019-02-08 NOTE — XMS REPORT
[facility]

 Created on: 2018



Richi Sandrita

External Reference #: [id number]

: 1934

Sex: Female



Demographics







 Address  [address] [place]  [address]

 

 Preferred Language  Unknown

 

 Marital Status  Unknown

 

 Scientologist Affiliation  Unknown

 

 Race  Unknown

 

 Ethnic Group  Unknown





Author







 Author  YULISA RANGEL

 

 [facility]

 

 Address  [address] [place]  [address]



 

 Phone  [phone]







Care Team Providers







 Care Team Member Name  Role  Phone

 

 YULISA RANGEL  Unavailable  [phone]







PROBLEMS







 Type  Condition  ICD9-CM Code  GIA42-AH Code  Onset Dates  Condition Status  
SNOMED Code

 

 Problem  Essential hypertension     I10     Active  81326764

 

 Problem  Type 2 diabetes mellitus without complication, without long-term 
current use of insulin     E11.9     Active  408744816

 

 Problem  Anxiety     F41.9     Active  36836438

 

 Problem  Lumbago with sciatica, left side     M54.42     Active  509729401

 

 Problem  Arthritis     M19.90     Active  8191721

 

 Problem  Iron deficiency anemia, unspecified iron deficiency anemia type     
D50.9     Active  74886904

 

 Problem  Venous insufficiency     I87.2     Active  17894205

 

 Problem  Gastroesophageal reflux disease, esophagitis presence not specified  
   K21.9     Active  005871666

 

 Problem  Seasonal allergic rhinitis due to pollen     J30.1     Active  
97726021

 

 Problem  Anemia of chronic disease     D63.8     Active  609306980

 

 Problem  Chronic kidney disease, stage 3 (moderate)     N18.3     Active  
515796639







ALLERGIES

No Information



ENCOUNTERS







 Encounter  Location  Date  Diagnosis

 

 [place] [address] [place] [address] 47222-
0514    Type 2 diabetes mellitus without complication, without long-
term current use of insulin E11.9 ; Essential hypertension I10 and Anemia of 
chronic disease D63.8

 

 [facility]  [address], [place] [address]
2422    Arthritis M19.90 and Essential hypertension I10

 

 [facility]  [address] [place] [address] 14312-
9787  15 May, 2018   

 

 [facility]  [address] [place] [address] 98892-
1884  15 May, 2018   

 

 [place] [address] [place] [id number] [place] [address] 25518-
0432  15 May, 2018  Medicare annual wellness visit, initial Z00.00 ; Type 2 
diabetes mellitus without complication, without long-term current use of 
insulin E11.9 ; Chronic kidney disease, stage 3 (moderate) N18.3 ; Essential 
hypertension I10 ; Gastroesophageal reflux disease, esophagitis presence not 
specified K21.9 ; Anxiety F41.9 ; Arthritis M19.90 and Encounter for 
immunization Z23

 

 [place] [address] [place] [id number] [place] [address] 58361-
9783  03 May, 2018  Essential hypertension I10

 

 [place] [address] [place] [id number] [place] [address] 30499-
4823    Arthritis M19.90

 

 [place] [address] [place] [id number] [place] [address] 13430-
3956     

 

 [place] [address] [place] [id number] [place] [address] 22666-
3537     

 

 [place] [address] [place] [id number] [place] [address] 64827-
2811    Essential hypertension I10

 

 [facility]ALK [facility]  [address] [place] [address] [id number] [place] [address] 65254
-[phone]  27 Mar, 2018  Dysuria R30.0 and Vaginal candida B37.3

 

 [place] [address] [place] [address] 56159-
1613  08 Mar, 2018  Arthritis M19.90

 

 [place] [address] [place] [id number] [place] [address] 03921-
6299     

 

 [place] [address] [place] [id number] [place] [address] 39556-
8970    Type 2 diabetes mellitus without complication, without long-
term current use of insulin E11.9 ; Lumbago with sciatica, left side M54.42 ; 
Arthritis M19.90 ; Iron deficiency anemia, unspecified iron deficiency anemia 
type D50.9 and BMI 40.0-44.9, adult Z68.41

 

 [id number] [place] [address] 22309-
6665     

 

 [facility]  [address] [place] [id number] [place] [address] 77520-
0430     

 

 [facility]  [address] [place] [id number] [place] [address] 90179-
9500    Arthritis M19.90 and Anxiety F41.9

 

 [facility]  [address] [address]-
9891     

 

 [facility]  [address] [place] [id number] [place] [address] 37831-
2586  18 Dec, 2017  Anxiety F41.9

 

 [facility]  [address] [place] [id number] [place] [address] 75286-
1336     

 

 [facility]  [address] [place] [id number] [place] [address] 33577-
7673     

 

 [facility]  [address] [place] [id number] [place] [address] 85309-
4614    Vaginal yeast infection B37.3

 

 [facility]  [address] [place] [id number] [place] [address] 12717-
9058    Anxiety F41.9

 

 [facility]  [address] [place] [id number] [place] [address] 90129-
7150    Type 2 diabetes mellitus without complication, without long-
term current use of insulin E11.9

 

 [facility]  [address] [place] [id number] [place] [address] 06609-
4554  08 2017   

 

 [facility]  [address] [place] [id number] [place] [address] 70749-
3808     

 

 [facility]  [address] [place] [id number] [place] [address] 49714-
2066  24 Oct, 2017  Arthritis M19.90

 

 [facility]  [address] [place] [id number] [place] [address] 25096-
4071  16 Oct, 2017   

 

 [facility]  [address] [place] [id number] [place] [address] 38566-
5710  04 Oct, 2017   

 

 [place] [address] [place] [id number] [place] [address] 79756-
9816  05 Sep, 2017   

 

 [place] [address] [place] [id number] [place] [address] 61915-
4754  25 Aug, 2017  Venous insufficiency I87.2 and Venous stasis dermatitis of 
right lower extremity I87.2

 

 [place] [address] [place] [id number] [place] [address] 77955-
4479  21 Aug, 2017  Essential hypertension I10

 

 [address] [address]-
0585     

 

 [place] [address] [address]-
5623    Type 2 diabetes mellitus without complication, without long-
term current use of insulin E11.9 and Essential hypertension I10

 

 [place] [address] [place] [id number] [place] [address] 34033-
8201    Essential hypertension I10 and Type 2 diabetes mellitus 
without complication, without long-term current use of insulin E11.9

 

 [place] [address] [place] [address] 97938-
4389    Chronic kidney disease, stage 3 (moderate) N18.3 ; Anemia 
of chronic disease D63.8 and Type 2 diabetes mellitus without complication, 
without long-term current use of insulin E11.9

 

 [place] [address] [place] [id number] [place] [address] 40319-
0254    Type 2 diabetes mellitus without complication, without long-
term current use of insulin E11.9 ; Iron deficiency anemia secondary to 
inadequate dietary iron intake D50.8 ; Arthritis M19.90 and Lumbago with 
sciatica, left side M54.42

 

 [place] [address] [place] [address] 66912-
4750    Arthritis M19.90 and Anxiety F41.9

 

 [address] [place] [address]-
2822    Type 2 diabetes mellitus without complication, without long-
term current use of insulin E11.9 and Essential hypertension I10

 

 [place] [address] [place] [id number] [place] [address] 05849-
8997  22 May, 2017  Anxiety F41.9

 

 [facility]  [address] [place] [id number] [place] [address] 44921-
0542  18 May, 2017  Monilial rash B37.2

 

 [facility]  [address] 9[address]18-
8826  16 May, 2017   

 

 [place] [address] 15392-
6152  15 May, 2017   

 

 [place] [address] [address]179-
2477  11 May, 2017   

 

 [place] [address] 66973-
1382  11 May, 2017  Gastroesophageal reflux disease, esophagitis presence not 
specified K21.9

 

 [place] [address] [place] [id number] [place] [address] 42707-
9377  09 May, 2017  Arthritis M19.90

 

 [place] [address] [place] [id number] [place] [address] 90254-
8821  09 May, 2017  Anxiety F41.9 ; Arthritis M19.90 and Essential hypertension 
I10

 

 [place] [address] [place] [id number] [place] [address] 55241-
4615  05 May, 2017  Essential hypertension I10 and Anxiety F41.9

 

 [place] [address] [place] [id number] [place] [address] 51287-
0184     

 

 [place] [address] [place] [id number] [place] [address] 62692-
5670    Arthritis M19.90 and Anxiety F41.9

 

 [place] [address] [place] [id number] [place] [address] 04811-
9513    Type 2 diabetes mellitus without complication, without long-
term current use of insulin E11.9 ; Cellulitis of right lower extremity L03.115 
and Arthritis M19.90

 

 [place] [address] [place] [id number] [place] [address] 13463-
4975  28 Mar, 2017   

 

 [place] [address] [place] [id number] [place] [address] 98727-
3186  20 Mar, 2017  Type 2 diabetes mellitus without complication, without long-
term current use of insulin E11.9 ; Bronchitis J40 and Arthritis M19.90

 

 [place] [address] [place] [id number] [place] [address] 01787-
6248  16 Mar, 2017   

 

 [place] [address] [address]-
2317  24 2017  Anxiety F41.9

 

 [facility] [place] [address] [place] [id number] [place] [address] 84759
-1550    Dysuria R30.0 ; Acute cystitis with hematuria N30.01 and 
Vaginal yeast infection B37.3

 

 [place] [address] [place] [id number] [place] [address] 61209-
7083    Arthritis M19.90

 

 [facility] [place] [address] [place] [id number] [place] [address] 01725
-[phone]    Strep pharyngitis J02.0 and Sore throat J02.9

 

 [place] [address] [place] [address] 18576-
2719     

 

 [place] [address] [place] [id number] [place] [address] 36651-
9292    Type 2 diabetes mellitus without complication, without long-
term current use of insulin E11.9

 

 [place] [address] [place] [id number] [place] [address] 74210-
7625  14 Dec, 2016   

 

 [place] [address] [place] [id number] [place] [address] 91064-
3998  13 Dec, 2016  Seasonal allergic rhinitis due to pollen J30.1

 

 [facility]  [address] [place] [id number] [place] [address] 99059
-[phone]  09 Dec, 2016  Impacted cerumen of right ear H61.21 and Seasonal allergic 
rhinitis due to pollen J30.1

 

 [facility]  [address] [place] [id number] [place] [address] 28410-
0471  09 Dec, 2016   

 

 [facility]  [address] [place] [id number] [place] [address] 36543-
1544  05 Dec, 2016   

 

 [facility]  [address] [place] [id number] [place] [address] 91793-
9738    Type 2 diabetes mellitus without complication, without long-
term current use of insulin E11.9 ; Anxiety F41.9 ; Essential hypertension I10 
and Encounter for immunization Z23

 

 [facility]  [address] [place] [id number] [place] [address] 74600-
7957    Essential hypertension I10

 

 [place] [address] [address]39-
2008     

 

 [facility]  [address] [place] [id number] [place] [address] 00404-
4013     

 

 [facility]  [address] [place] [id number] [place] [address] 16366-
5946    Essential hypertension I10

 

 [facility]  [address] [place] [id number] [place] [address] 60910-
7490     

 

 [facility]  [address] [place] [id number] [place] [address] 33278-
4944  23 Sep, 2016   

 

 [facility]  [address] [place] [id number] [place] [address] 91440-
3104  01 Sep, 2016   

 

 [facility]  [address] [address]708-
4049  30 Aug, 2016   

 

 [facility]  [address] [place] [id number] [place] [address] 00073-
8762  24 Aug, 2016   

 

 [facility]  [address] [place] [id number] [place] [address] 08808-
4014  22 Aug, 2016   

 

 [facility]  [address] [facility] [id number] [place] [address]-
0634  22 Aug, 2016  Type 2 diabetes mellitus without complication, without long-
term current use of insulin E11.9 ; Essential hypertension I10 ; Acute non-
recurrent maxillary sinusitis J01.00 ; Arthritis M19.90 ; Lumbago with sciatica
, left side M54.42 ; Other chronic pain G89.29 and Anxiety F41.9







IMMUNIZATIONS

No Known Immunizations



SOCIAL HISTORY

Never Assessed



REASON FOR VISIT

Wheel chair refax



PLAN OF CARE





VITAL SIGNS





MEDICATIONS







 Medication  Instructions  Dosage  Frequency  Start Date  End Date  Duration  
Status

 

 Wheelchair -     use for mobility             Active







RESULTS

No Results



PROCEDURES

No Known procedures



INSTRUCTIONS





MEDICATIONS ADMINISTERED

No Known Medications



MEDICAL (GENERAL) HISTORY







 Type  Description  Date

 

 Medical History  type II diabetes   

 

 Medical History  hypertension   

 

 Medical History  Arthritis   

 

 Medical History  skin cancer-scalp   

 

 Surgical History  hysterectomy   

 

 Surgical History  skin cancer removal-scalp   

 

 Hospitalization History  Surgery(s) only   

 

 Hospitalization History  dehydration  2017

 

 Hospitalization History  bronchitis  2107

## 2019-02-08 NOTE — XMS REPORT
[facility]

 Created on: 2016



Richi Sandrita

External Reference #: [id number]

: 1934

Sex: Female



Demographics







 Address  [address] [place]  [address]

 

 Home Phone  [phone]

 

 Preferred Language  Unknown

 

 Marital Status  Unknown

 

 Zoroastrianism Affiliation  Unknown

 

 Race   or 

 

 Ethnic Group  Not  or 





Author







 Author  YULISA RANGEL

 

 Organization  [employer]

 

 Address  Unknown

 

 Phone  Unavailable







Care Team Providers







 Care Team Member Name  Role  Phone

 

 YULISA RANGEL  CP  Unavailable



                                                                



Allergies

          No Known Allergies                                                   
                                     



Problems

          





 Problem Type  Condition  Code  Onset Dates  Condition Status

 

 Problem  Essential hypertension  I10     Active

 

 Problem  Arthritis  M19.90     Active

 

 Problem  Type 2 diabetes mellitus without complication, without long-term 
current use of insulin  E11.9     Active

 

 Problem  Lumbago with sciatica, left side  M54.42     Active

 

 Problem  Anxiety  F41.9     Active



                                                                               
                                                 



Medications

          





 Medication  Code System  Code  Instructions  Start Date  End Date  Status  
Dosage

 

 Alprazolam  NDC  [address]-10  0.5 MG Orally Three times a day           1 
tablet



                                                                              



Results

          No Known Results                                                     
               



Summary Purpose

          [employer] Submission

## 2019-02-08 NOTE — XMS REPORT
[facility]

 Created on: 2018



Richi Sandrita

External Reference #: [id number]

: 1934

Sex: Female



Demographics







 Address  [address] [place]  [address]

 

 Preferred Language  Unknown

 

 Marital Status  Unknown

 

 Zoroastrian Affiliation  Unknown

 

 Race  Unknown

 

 Ethnic Group  Unknown





Author







 Author  YULISA RANGEL

 

 [facility]

 

 Address  [address] [place]  [address]



 

 Phone  [phone]







Care Team Providers







 Care Team Member Name  Role  Phone

 

 YULISA RANGEL  Unavailable  [phone]







PROBLEMS







 Type  Condition  ICD9-CM Code  DRY66-UX Code  Onset Dates  Condition Status  
SNOMED Code

 

 Problem  Essential hypertension     I10     Active  97686037

 

 Problem  Type 2 diabetes mellitus without complication, without long-term 
current use of insulin     E11.9     Active  341657524

 

 Problem  Anxiety     F41.9     Active  97097450

 

 Problem  Lumbago with sciatica, left side     M54.42     Active  480189362

 

 Problem  Arthritis     M19.90     Active  3082761

 

 Problem  Iron deficiency anemia, unspecified iron deficiency anemia type     
D50.9     Active  14141076

 

 Problem  Venous insufficiency     I87.2     Active  67441666

 

 Problem  Gastroesophageal reflux disease, esophagitis presence not specified  
   K21.9     Active  033514571

 

 Problem  Seasonal allergic rhinitis due to pollen     J30.1     Active  
66841700

 

 Problem  Anemia of chronic disease     D63.8     Active  575953999

 

 Problem  Chronic kidney disease, stage 3 (moderate)     N18.3     Active  
[id number]







ALLERGIES

No Information



ENCOUNTERS







 Encounter  Location  Date  Diagnosis

 

 [place] [address] [place] [id number] [place] [address] 07366-
0028     

 

 [place] [id number] [place] [address] 50192-
8719    Arthritis M19.90 and Essential hypertension I10

 

 [facility]  [id number] [place] [address] 17007-
7980  15 May, 2018   

 

 [place] [address] [place] [id number] [place] [address] 49093-
3741  15 May, 2018   

 

 [place] [address] [id number] [place] [address] 25767-
4732  15 May, 2018  Medicare annual wellness visit, initial Z00.00 ; Type 2 
diabetes mellitus without complication, without long-term current use of 
insulin E11.9 ; Chronic kidney disease, stage 3 (moderate) N18.3 ; Essential 
hypertension I10 ; Gastroesophageal reflux disease, esophagitis presence not 
specified K21.9 ; Anxiety F41.9 ; Arthritis M19.90 and Encounter for 
immunization Z23

 

 [facility]  [address] [address]-
5166  03 May, 2018  Essential hypertension I10

 

 [place] [address] [address]-
7476    Arthritis M19.90

 

 [place] [address] 7[address]25-
7080     

 

 [place] [address] 78948-
6837     

 

 [place] [address] [address]348-
5827    Essential hypertension I10

 

 [facility]  [address] [phone]
[phone]  27 Mar, 2018  Dysuria R30.0 and Vaginal candida B37.3

 

 [place] [address] 93592-
7481  08 Mar, 2018  Arthritis M19.90

 

 [place] [address] 89618-
8037     

 

 [place] [address] [address]65-
7493    Type 2 diabetes mellitus without complication, without long-
term current use of insulin E11.9 ; Lumbago with sciatica, left side M54.42 ; 
Arthritis M19.90 ; Iron deficiency anemia, unspecified iron deficiency anemia 
type D50.9 and BMI 40.0-44.9, adult Z68.41

 

 [place] [address] 73453-
8445     

 

 [id number] [place] [address] 00194-
9651     

 

 [facility]  [id number] [place] [address] 35343-
0369    Arthritis M19.90 and Anxiety F41.9

 

 [facility]  [id number] [place] [address] 76112-
6063     

 

 [facility]  [address] [address]352-
8912  18 Dec, 2017  Anxiety F41.9

 

 [facility]  [id number] [place] [address] 19978-
9914     

 

 [facility]  [id number] [place] [address] 59136-
1095     

 

 [facility]  [id number] [place] [address] 92911-
5967    Vaginal yeast infection B37.3

 

 [facility]  [id number] [place] [address] 14729-
9258    Anxiety F41.9

 

 [facility]  [id number] [place] [address] 90739-
8853    Type 2 diabetes mellitus without complication, without long-
term current use of insulin E11.9

 

 [facility]  [id number] [place] [address] 90915-
7148     

 

 [facility]  [id number] [place] [address] 06339-
1283     

 

 [facility]  [id number] [place] [address] 47881-
7206  24 Oct, 2017  Arthritis M19.90

 

 [facility]  [id number] [place] [address] 44509-
4026  16 Oct, 2017   

 

 [facility]  [id number] [place] [address] 07530-
4992  04 Oct, 2017   

 

 [facility]  [id number] [place] [address] 00957-
0007  05 Sep, 2017   

 

 [place] [address] 06558-
8386  25 Aug, 2017  Venous insufficiency I87.2 and Venous stasis dermatitis of 
right lower extremity I87.2

 

 [place] [id number] [place] [address] 61653-
9781  21 Aug, 2017  Essential hypertension I10

 

 [place] [address] 60353-
6987     

 

 [address] [address]-
6012    Type 2 diabetes mellitus without complication, without long-
term current use of insulin E11.9 and Essential hypertension I10

 

 [place] [id number] [place] [address] 58984-
4389    Essential hypertension I10 and Type 2 diabetes mellitus 
without complication, without long-term current use of insulin E11.9

 

 [place] [id number] [place] [address] 91557-
9580    Chronic kidney disease, stage 3 (moderate) N18.3 ; Anemia 
of chronic disease D63.8 and Type 2 diabetes mellitus without complication, 
without long-term current use of insulin E11.9

 

 [place] [id number] [place] [address] 58619-
8390    Type 2 diabetes mellitus without complication, without long-
term current use of insulin E11.9 ; Iron deficiency anemia secondary to 
inadequate dietary iron intake D50.8 ; Arthritis M19.90 and Lumbago with 
sciatica, left side M54.42

 

 [place] [id number] [place] [address] 42297-
9197    Arthritis M19.90 and Anxiety F41.9

 

 [place] [id number] [place] [address] 33329-
0532    Type 2 diabetes mellitus without complication, without long-
term current use of insulin E11.9 and Essential hypertension I10

 

 [facility]  [address] [place] [address] 14183-
0711  22 May, 2017  Anxiety F41.9

 

 [place] [address] [id number] [place] [address] 22074-
1247  18 May, 2017  Monilial rash B37.2

 

 [place] [address] [id number] [place] [address] 82626-
3129  16 May, 2017   

 

 [facility]  [id number] [place] [address] 55675-
8308  15 May, 2017   

 

 [place] [address] 54274-
1432  11 May, 2017   

 

 [place] [address] [id number] [place] [address] 03679-
3884  11 May, 2017  Gastroesophageal reflux disease, esophagitis presence not 
specified K21.9

 

 [place] [id number] [place] [address] 71009-
9855  09 May, 2017  Arthritis M19.90

 

 [place] [address] [id number] [place] [address] 65407-
4711  09 May, 2017  Anxiety F41.9 ; Arthritis M19.90 and Essential hypertension 
I10

 

 [place] [address] [id number] [place] [address] 13097-
6675  05 May, 2017  Essential hypertension I10 and Anxiety F41.9

 

 [place] [address] [id number] [place] [address] 82901-
2782     

 

 [place] [address] [place] [address] 78341-
3562    Arthritis M19.90 and Anxiety F41.9

 

 [place] [address] [place] [address] 79014-
9182    Type 2 diabetes mellitus without complication, without long-
term current use of insulin E11.9 ; Cellulitis of right lower extremity L03.115 
and Arthritis M19.90

 

 [place] [address] [id number] [place] [address] 04072-
8821  28 Mar, 2017   

 

 [place] [address] 38218-
3873  20 Mar, 2017  Type 2 diabetes mellitus without complication, without long-
term current use of insulin E11.9 ; Bronchitis J40 and Arthritis M19.90

 

 [place] [address] 67656-
8927  16 Mar, 2017   

 

 [place] [address] 45428-
0954    Anxiety F41.9

 

 [facility] WALK IN [address] [address]
-[phone]    Dysuria R30.0 ; Acute cystitis with hematuria N30.01 and 
Vaginal yeast infection B37.3

 

 [address] [address]-
7140    Arthritis M19.90

 

 [facility] WALK IN [place] [address] [address]
-[phone]    Strep pharyngitis J02.0 and Sore throat J02.9

 

 [place] [id number] [place] [address] 21827-
7258     

 

 [place] [address] [place] [address] [id number] [place] [address] 17550-
1071    Type 2 diabetes mellitus without complication, without long-
term current use of insulin E11.9

 

 [place] [id number] [place] [address] 04110-
2797  14 Dec, 2016   

 

 [address] [address]14-
1398  13 Dec, 2016  Seasonal allergic rhinitis due to pollen J30.1

 

 [facility] WALK IN [place] [id number] [place] [address] 24116
-[phone]  09 Dec, 2016  Impacted cerumen of right ear H61.21 and Seasonal allergic 
rhinitis due to pollen J30.1

 

 [facility]  [id number] [place] [address] 35941-
9900  09 Dec, 2016   

 

 [facility]  [id number] [place] [address] 74283-
8234  05 Dec, 2016   

 

 [facility]  [id number] [place] [address] 76448-
9893    Type 2 diabetes mellitus without complication, without long-
term current use of insulin E11.9 ; Anxiety F41.9 ; Essential hypertension I10 
and Encounter for immunization Z23

 

 [facility]  [id number] [place] [address] 38211-
1634    Essential hypertension I10

 

 [facility]  [id number] [place] [address] 08821-
8842     

 

 [facility]  [id number] [place] [address] 92359-
5772     

 

 [facility]  [id number] [place] [address] 13130-
5426    Essential hypertension I10

 

 [facility]  [id number] [place] [address] 40722-
9030     

 

 [facility]  [id number] [place] [address] 37654-
9194  23 Sep, 2016   

 

 [facility]  [id number] [place] [address] 24439-
2680  01 Sep, 2016   

 

 [facility]  [id number] [place] [address] 35477-
5472  30 Aug, 2016   

 

 [facility]  [id number] [place] [address] 23647-
8056  24 Aug, 2016   

 

 [facility]  [id number] [place] [address] 14160-
7142  22 Aug, 2016   

 

 [facility]  [id number] [place] [address] 36890-
4092  22 Aug, 2016  Type 2 diabetes mellitus without complication, without long-
term current use of insulin E11.9 ; Essential hypertension I10 ; Acute non-
recurrent maxillary sinusitis J01.00 ; Arthritis M19.90 ; Lumbago with sciatica
, left side M54.42 ; Other chronic pain G89.29 and Anxiety F41.9







IMMUNIZATIONS

No Known Immunizations



SOCIAL HISTORY

Never Assessed



REASON FOR VISIT

Request order



PLAN OF CARE





VITAL SIGNS





MEDICATIONS

Unknown Medications



RESULTS

No Results



PROCEDURES

No Known procedures



INSTRUCTIONS





MEDICATIONS ADMINISTERED

No Known Medications



MEDICAL (GENERAL) HISTORY







 Type  Description  Date

 

 Medical History  type II diabetes   

 

 Medical History  hypertension   

 

 Medical History  Arthritis   

 

 Medical History  skin cancer-scalp   

 

 Surgical History  hysterectomy   

 

 Surgical History  skin cancer removal-scalp   

 

 Hospitalization History  Surgery(s) only   

 

 Hospitalization History  dehydration  2017

 

 Hospitalization History  bronchitis  2107

## 2019-02-08 NOTE — XMS REPORT
[facility]

 Created on: 2018



Richi Sandrita

External Reference #: [id number]

: 1934

Sex: Female



Demographics







 Address  [address] [place]  [address]

 

 Preferred Language  Unknown

 

 Marital Status  Unknown

 

 Tenriism Affiliation  Unknown

 

 Race  Unknown

 

 Ethnic Group  Unknown





Author







 Author  YULISA RANGEL

 

 Organization  [facility]

 

 Address  [address] [place]  [address]



 

 Phone  [phone]







Care Team Providers







 Care Team Member Name  Role  Phone

 

 YULISA RANGEL  Unavailable  [phone]







PROBLEMS







 Type  Condition  ICD9-CM Code  WZX38-MB Code  Onset Dates  Condition Status  
SNOMED Code

 

 Problem  Essential hypertension     I10     Active  50324390

 

 Problem  Type 2 diabetes mellitus without complication, without long-term 
current use of insulin     E11.9     Active  699977502

 

 Problem  Anxiety     F41.9     Active  50416038

 

 Problem  Lumbago with sciatica, left side     M54.42     Active  313919437

 

 Problem  Arthritis     M19.90     Active  6751549

 

 Problem  Iron deficiency anemia, unspecified iron deficiency anemia type     
D50.9     Active  09247421

 

 Problem  Venous insufficiency     I87.2     Active  27856114

 

 Problem  Gastroesophageal reflux disease, esophagitis presence not specified  
   K21.9     Active  482690012

 

 Problem  Seasonal allergic rhinitis due to pollen     J30.1     Active  
00537608

 

 Problem  Anemia of chronic disease     D63.8     Active  077545095

 

 Problem  Chronic kidney disease, stage 3 (moderate)     N18.3     Active  
290314563







ALLERGIES

No Information



ENCOUNTERS







 Encounter  Location  Date  Diagnosis

 

 [facility]  [id number] [place] [address] 77392-
0134  15 May, 2018  Medicare annual wellness visit, initial Z00.00

 

 [facility]  [id number] [place] [address] 50723-
5935  16 2018   

 

 [facility]  [address] [address]-
8785     

 

 [facility]  [id number] [place] [address] 52866-
3484    Essential hypertension I10

 

 [facility]  [id number] [place] [address] 38528
-[phone]  27 Mar, 2018  Dysuria R30.0 and Vaginal candida B37.3

 

 [facility]  [id number] [place] [address] 98160-
0932  08 Mar, 2018  Arthritis M19.90

 

 [facility]  [id number] [place] [address] 40768-
5359     

 

 [place] [id number] [place] [address] 94710-
2404    Type 2 diabetes mellitus without complication, without long-
term current use of insulin E11.9 ; Lumbago with sciatica, left side M54.42 ; 
Arthritis M19.90 ; Iron deficiency anemia, unspecified iron deficiency anemia 
type D50.9 and BMI 40.0-44.9, adult Z68.41

 

 [place] [id number] [place] [address] 88324-
4098     

 

 [place] [address] [address]-
4684     

 

 [place] [address] [address]-
8202    Arthritis M19.90 and Anxiety F41.9

 

 [place] [address] [address]-
8151     

 

 [place] [id number] [place] [address] 80808-
4849  18 Dec, 2017  Anxiety F41.9

 

 [place] [id number] [place] [address] 45660-
6444     

 

 [facility]  [id number] [place] [address] 98973-
0040     

 

 [place] [address] [address]-
4282    Vaginal yeast infection B37.3

 

 [place] [id number] [place] [address] 98278-
2260    Anxiety F41.9

 

 [place] [address] [address], [place] [address]-
5780    Type 2 diabetes mellitus without complication, without long-
term current use of insulin E11.9

 

 [facility]  [address], [place] [address]-
6984     

 

 [facility]  [id number] [place] [address] 33982-
0171     

 

 [facility]  [id number] [place] [address] 51548-
3131  24 Oct, 2017  Arthritis M19.90

 

 [facility]  [id number] [place] [address] 53489-
8489  16 Oct, 2017   

 

 [facility]  [id number] [place] [address] 28834-
5916  04 Oct, 2017   

 

 [place] [id number] [place] [address] 55910-
8349  05 Sep, 2017   

 

 [facility]  [id number] [place] [address] 90644-
5218  25 Aug, 2017  Venous insufficiency I87.2 and Venous stasis dermatitis of 
right lower extremity I87.2

 

 [place] [address] [place] [address] 97132-
9840  21 Aug, 2017  Essential hypertension I10

 

 [place] [address] [address] [address]-
3284     

 

 [facility]  [id number] [place] [address] 98843-
3040    Type 2 diabetes mellitus without complication, without long-
term current use of insulin E11.9 and Essential hypertension I10

 

 [place] [id number] [place] [address] 42498-
1856    Essential hypertension I10 and Type 2 diabetes mellitus 
without complication, without long-term current use of insulin E11.9

 

 [address], [place] [address]-
9030    Chronic kidney disease, stage 3 (moderate) N18.3 ; Anemia 
of chronic disease D63.8 and Type 2 diabetes mellitus without complication, 
without long-term current use of insulin E11.9

 

 [place] [id number] [place] [address] 08063-
2172  14 2017  Type 2 diabetes mellitus without complication, without long-
term current use of insulin E11.9 ; Iron deficiency anemia secondary to 
inadequate dietary iron intake D50.8 ; Arthritis M19.90 and Lumbago with 
sciatica, left side M54.42

 

 [place] [address] [address]-
7547    Arthritis M19.90 and Anxiety F41.9

 

 [place] [address] [address]66-
1596    Type 2 diabetes mellitus without complication, without long-
term current use of insulin E11.9 and Essential hypertension I10

 

 [place] [address] [address]-
4710  22 May, 2017  Anxiety F41.9

 

 [place] [address] [address]72-
8986  18 May, 2017  Monilial rash B37.2

 

 [place] [id number] [place] [address] 06932-
0388  16 May, 2017   

 

 [place] [id number] [place] [address] 97762-
4218  15 May, 2017   

 

 [place] [id number] [place] [address] 69866-
2907  11 May, 2017   

 

 [place] [address] [address]752-
6996  11 May, 2017  Gastroesophageal reflux disease, esophagitis presence not 
specified K21.9

 

 [place] [id number] [place] [address] 36797-
3547  09 May, 2017  Arthritis M19.90

 

 [place] [address] [address]-
7283  09 May, 2017  Anxiety F41.9 ; Arthritis M19.90 and Essential hypertension 
I10

 

 [place] [id number] [place] [address] 35244-
4321  05 May, 2017  Essential hypertension I10 and Anxiety F41.9

 

 [place] [id number] [place] [address] 85532-
1948     

 

 [place] [address] [address]80-
8473    Arthritis M19.90 and Anxiety F41.9

 

 [place] [id number] [place] [address] 42680-
9379    Type 2 diabetes mellitus without complication, without long-
term current use of insulin E11.9 ; Cellulitis of right lower extremity L03.115 
and Arthritis M19.90

 

 [place] [id number] [place] [address] 32135-
1533  28 Mar, 2017   

 

 [place] [id number] [place] [address] 41140-
5625  20 Mar, 2017  Type 2 diabetes mellitus without complication, without long-
term current use of insulin E11.9 ; Bronchitis J40 and Arthritis M19.90

 

 [place] [address] [place] [address] 75836-
0369  16 Mar, 2017   

 

 [place] [id number] [place] [address] 67860-
1097    Anxiety F41.9

 

 [facility]  [address] [place], [place] [id number] [place] [address] 86260
-[phone]    Dysuria R30.0 ; Acute cystitis with hematuria N30.01 and 
Vaginal yeast infection B37.3

 

 [place] [id number] [place] [address] 86788-
4555    Arthritis M19.90

 

 [facility]  [id number] [place] [address] 86371
-[phone]    Strep pharyngitis J02.0 and Sore throat J02.9

 

 [facility]  [id number] [place] [address] 33648-
7532     

 

 [facility]  [address] [address]71-
7334    Type 2 diabetes mellitus without complication, without long-
term current use of insulin E11.9

 

 [place] [address] 79791-
3905  14 Dec, 2016   

 

 [facility]  [address] [address]-
7181  13 Dec, 2016  Seasonal allergic rhinitis due to pollen J30.1

 

 [facility]  [address] [address]
-4392  09 Dec, 2016  Impacted cerumen of right ear H61.21 and Seasonal allergic 
rhinitis due to pollen J30.1

 

 [place] [address] 04308-
3505  09 Dec, 2016   

 

 [place] [address] [address]00-
1220  05 Dec, 2016   

 

 [place] [address] 6[address]45-
6541    Type 2 diabetes mellitus without complication, without long-
term current use of insulin E11.9 ; Anxiety F41.9 ; Essential hypertension I10 
and Encounter for immunization Z23

 

 [place] [id number] [place] [address] 50396-
5842    Essential hypertension I10

 

 [place] [address] [address]-
4408     

 

 [place] [address] 97872-
9664     

 

 [place] [address] [address]19-
0796    Essential hypertension I10

 

 [facility]  [id number] [address]-
9322     

 

 [facility]  [id number] [address]-
8743  23 Sep, 2016   

 

 [facility]  [address] [address]-
5342  01 Sep, 2016   

 

 [facility]  [address] [place] [address]-
7595  30 Aug, 2016   

 

 [facility]  [address] [place] [address]-
7689  24 Aug, 2016   

 

 [facility]  [address] [place] [address]-
5324  22 Aug, 2016   

 

 [facility]  [address] [place] [address]-
7222  22 Aug, 2016  Type 2 diabetes mellitus without complication, without long-
term current use of insulin E11.9 ; Essential hypertension I10 ; Acute non-
recurrent maxillary sinusitis J01.00 ; Arthritis M19.90 ; Lumbago with sciatica
, left side M54.42 ; Other chronic pain G89.29 and Anxiety F41.9







IMMUNIZATIONS

No Known Immunizations



SOCIAL HISTORY

Never Assessed



REASON FOR VISIT

Note



PLAN OF CARE





VITAL SIGNS





MEDICATIONS

Unknown Medications



RESULTS

No Results



PROCEDURES

No Known procedures



INSTRUCTIONS





MEDICATIONS ADMINISTERED

No Known Medications



MEDICAL (GENERAL) HISTORY







 Type  Description  Date

 

 Medical History  type II diabetes   

 

 Medical History  hypertension   

 

 Medical History  Arthritis   

 

 Medical History  skin cancer-scalp   

 

 Surgical History  hysterectomy   

 

 Surgical History  skin cancer removal-scalp   

 

 Hospitalization History  Surgery(s) only   

 

 Hospitalization History  dehydration  2017

 

 Hospitalization History  bronchitis  2107

## 2019-02-08 NOTE — XMS REPORT
[facility]

 Created on: 2018



Richi Sandrita

External Reference #: [id number]

: 1934

Sex: Female



Demographics







 Address  [address] [place]  [address]

 

 Preferred Language  Unknown

 

 Marital Status  Unknown

 

 Alevism Affiliation  Unknown

 

 Race  Unknown

 

 Ethnic Group  Unknown





Author







 Author  YULISA RANGEL

 

 [facility]

 

 Address  [address] [place]  [address]



 

 Phone  [phone]







Care Team Providers







 Care Team Member Name  Role  Phone

 

 YULISA RANGEL  Unavailable  [phone]







PROBLEMS







 Type  Condition  ICD9-CM Code  UEI47-FC Code  Onset Dates  Condition Status  
SNOMED Code

 

 Problem  Essential hypertension     I10     Active  81592572

 

 Problem  Type 2 diabetes mellitus without complication, without long-term 
current use of insulin     E11.9     Active  758142167

 

 Problem  Anxiety     F41.9     Active  28945667

 

 Problem  Lumbago with sciatica, left side     M54.42     Active  396026030

 

 Problem  Arthritis     M19.90     Active  8816790

 

 Problem  Iron deficiency anemia, unspecified iron deficiency anemia type     
D50.9     Active  58830815

 

 Problem  Venous insufficiency     I87.2     Active  70780346

 

 Problem  Gastroesophageal reflux disease, esophagitis presence not specified  
   K21.9     Active  082265870

 

 Problem  Seasonal allergic rhinitis due to pollen     J30.1     Active  
60317660

 

 Problem  Anemia of chronic disease     D63.8     Active  779921086

 

 Problem  Chronic kidney disease, stage 3 (moderate)     N18.3     Active  
[id number]







ALLERGIES

No Information



ENCOUNTERS







 Encounter  Location  Date  Diagnosis

 

 [facility] [place] [facility]  [address], [place] [address]  27 Mar, 2018  Dysuria R30.0 and Vaginal candida B37.3

 

 [facility]  [address] [place] [address]-
8536  08 Mar, 2018  Arthritis M19.90

 

 [facility]  [id number] [place] [address]187-
2540     

 

 [facility]  [address] [place] [address]31-
8765    Type 2 diabetes mellitus without complication, without long-
term current use of insulin E11.9 ; Lumbago with sciatica, left side M54.42 ; 
Arthritis M19.90 ; Iron deficiency anemia, unspecified iron deficiency anemia 
type D50.9 and BMI 40.0-44.9, adult Z68.41

 

 [place] [address] [address]-
7[address]     

 

 [place] [address] [place] [address]996-
7557     

 

 [place] [address] [address]-
1320    Arthritis M19.90 and Anxiety F41.9

 

 [place] [address] [address]-
7484     

 

 [place] [address] [address]-
2508  18 Dec, 2017  Anxiety F41.9

 

 [place] [address] [address]-
2216     

 

 [place] [address] [address]-
0959     

 

 [place] [address] [address]-
8413    Vaginal yeast infection B37.3

 

 [place] [address] [place] [address] 14257-
8357    Anxiety F41.9

 

 [place] [address] [place] [address]607-
6072    Type 2 diabetes mellitus without complication, without long-
term current use of insulin E11.9

 

 [place] [address] [address]-
4161     

 

 [place] [address] [place] [address]28-
2559     

 

 [place] [address] [place] [address] 33568-
2644  24 Oct, 2017  Arthritis M19.90

 

 [place] [address] [address]-
9796  16 Oct, 2017   

 

 [place] [address] [place] [address]98-
4658  04 Oct, 2017   

 

 [place] [address] [place] [address]86-
2323  05 Sep, 2017   

 

 [place] [address] [place] [address]261-
4374  25 Aug, 2017  Venous insufficiency I87.2 and Venous stasis dermatitis of 
right lower extremity I87.2

 

 [place] [address] [place] [address] 08787-
8839  21 Aug, 2017  Essential hypertension I10

 

 [place] [address] [place] [address] 52278-
2440     

 

 [place] [address] [place] [address] 62271-
5657    Type 2 diabetes mellitus without complication, without long-
term current use of insulin E11.9 and Essential hypertension I10

 

 [place] [address] [place] [address] 13167-
8912    Essential hypertension I10 and Type 2 diabetes mellitus 
without complication, without long-term current use of insulin E11.9

 

 [place] [address] [place] [address] 39416-
1531    Chronic kidney disease, stage 3 (moderate) N18.3 ; Anemia 
of chronic disease D63.8 and Type 2 diabetes mellitus without complication, 
without long-term current use of insulin E11.9

 

 [place] [address] [place] [address]303-
6750    Type 2 diabetes mellitus without complication, without long-
term current use of insulin E11.9 ; Iron deficiency anemia secondary to 
inadequate dietary iron intake D50.8 ; Arthritis M19.90 and Lumbago with 
sciatica, left side M54.42

 

 [place] [address] [place] [address] 25875-
7353    Arthritis M19.90 and Anxiety F41.9

 

 [facility]  [address] [address]-
3253    Type 2 diabetes mellitus without complication, without long-
term current use of insulin E11.9 and Essential hypertension I10

 

 [facility]  [address] [address]-
7872  22 May, 2017  Anxiety F41.9

 

 [facility]  [address] [address]-
6289  18 May, 2017  Monilial rash B37.2

 

 [facility]  [address] [address]-
1581  16 May, 2017   

 

 [facility]  [address] [address]-
5936  15 May, 2017   

 

 [facility]  [address] [address]-
7450  11 May, 2017   

 

 [facility]  [address] [place] [address] 53011-
3845  11 May, 2017  Gastroesophageal reflux disease, esophagitis presence not 
specified K21.9

 

 [facility]  [address] [place] [address] 82443-
0029  09 May, 2017  Arthritis M19.90

 

 [facility]  [address] [place] [address] 91464-
9824  09 May, 2017  Anxiety F41.9 ; Arthritis M19.90 and Essential hypertension 
I10

 

 [facility]  [address] [place] [address] 09865-
3795  05 May, 2017  Essential hypertension I10 and Anxiety F41.9

 

 [facility]  [address] [place] [address] 97318-
8292     

 

 [facility]  [address] [place] [address] 03203-
4319    Arthritis M19.90 and Anxiety F41.9

 

 [place] [address] [place] [address] 43289-
8621  11 2017  Type 2 diabetes mellitus without complication, without long-
term current use of insulin E11.9 ; Cellulitis of right lower extremity L03.115 
and Arthritis M19.90

 

 [place] [address] [place] [address] 28780-
4612  28 Mar, 2017   

 

 [place] [address] [address]-
1240  20 Mar, 2017  Type 2 diabetes mellitus without complication, without long-
term current use of insulin E11.9 ; Bronchitis J40 and Arthritis M19.90

 

 [place] [address] [place] [place] [address] 85168-
4792  16 Mar, 2017   

 

 [place] [address] [place] [address] 75336-
8605  24 2017  Anxiety F41.9

 

 [facility]  [address] [place] [address]
-[phone]    Dysuria R30.0 ; Acute cystitis with hematuria N30.01 and 
Vaginal yeast infection B37.3

 

 [place] [address] [place] [place] [address] 79111-
7720    Arthritis M19.90

 

 [facility]  [address] [place] [address]
-[phone]    Strep pharyngitis J02.0 and Sore throat J02.9

 

 [place] [address] [place] [address] [place] [address] 82964-
7092     

 

 [place] [address] [place] [address] [place] [address] 42798-
3201    Type 2 diabetes mellitus without complication, without long-
term current use of insulin E11.9

 

 [place] [address] [place] [address] 52594-
3806  14 Dec, 2016   

 

 [place] [address] [place] [address] 0[address]12-
2001  13 Dec, 2016  Seasonal allergic rhinitis due to pollen J30.1

 

 [facility]  [address] [address]
-[phone]  09 Dec, 2016  Impacted cerumen of right ear H61.21 and Seasonal allergic 
rhinitis due to pollen J30.1

 

 [facility]  [address] [address]-
8698  09 Dec, 2016   

 

 [facility]  [address] [address]-
8781  05 Dec, 2016   

 

 [facility]  [address] [address]-
4394    Type 2 diabetes mellitus without complication, without long-
term current use of insulin E11.9 ; Anxiety F41.9 ; Essential hypertension I10 
and Encounter for immunization Z23

 

 [facility]  [address] [address]-
3358    Essential hypertension I10

 

 [facility]  [address] [place] [address] 53102-
3601     

 

 [place] [address] [address]-
4588     

 

 [facility]  [address] [place] [address] 82038-
8586    Essential hypertension I10

 

 [facility]  [address] [place] [address] 07261-
8066     

 

 [facility]  [address] [place] [address] 92216-
2127  23 Sep, 2016   

 

 [facility]  [address] [address]-
4265  01 Sep, 2016   

 

 [facility]  [address] [place] [address] 09793-
2092  30 Aug, 2016   

 

 [facility]  [address] [place] [address]-
0663  24 Aug, 2016   

 

 [facility]  [address] [facility] [id number] [place] [address]-
1038  22 Aug, 2016   

 

 [facility]  [address] [facility] [id number] [address]-
7495  22 Aug, 2016  Type 2 diabetes mellitus without complication, without long-
term current use of insulin E11.9 ; Essential hypertension I10 ; Acute non-
recurrent maxillary sinusitis J01.00 ; Arthritis M19.90 ; Lumbago with sciatica
, left side M54.42 ; Other chronic pain G89.29 and Anxiety F41.9







IMMUNIZATIONS

No Known Immunizations



SOCIAL HISTORY

Never Assessed



REASON FOR VISIT

anemia



PLAN OF CARE





VITAL SIGNS





MEDICATIONS







 Medication  Instructions  Dosage  Frequency  Start Date  End Date  Duration  
Status

 

 Glimepiride 2 MG  Orally Once a day  1 tablet with breakfast or the first main 
meal of the day  24h       30 day(s)  Active







RESULTS

No Results



PROCEDURES

No Known procedures



INSTRUCTIONS





MEDICATIONS ADMINISTERED

No Known Medications



MEDICAL (GENERAL) HISTORY







 Type  Description  Date

 

 Medical History  type II diabetes   

 

 Medical History  hypertension   

 

 Medical History  Arthritis   

 

 Medical History  skin cancer-scalp   

 

 Surgical History  hysterectomy   

 

 Surgical History  skin cancer removal-scalp   

 

 Hospitalization History  Surgery(s) only   

 

 Hospitalization History  dehydration  2017

 

 Hospitalization History  bronchitis  2107

## 2019-02-08 NOTE — XMS REPORT
[facility]

 Created on: 10/13/2018



Richi Sandrita

External Reference #: [id number]

: 1934

Sex: Female



Demographics







 Address  [address] [place]  [address]

 

 Preferred Language  Unknown

 

 Marital Status  Unknown

 

 Jehovah's witness Affiliation  Unknown

 

 Race  Unknown

 

 Ethnic Group  Unknown





Author







 Author  YULISA RANGEL

 

 [facility]

 

 Address  [address] [place]  [address]



 

 Phone  [phone]







Care Team Providers







 Care Team Member Name  Role  Phone

 

 YULISA RANGEL  Unavailable  [phone]







PROBLEMS







 Type  Condition  ICD9-CM Code  LPV00-DH Code  Onset Dates  Condition Status  
SNOMED Code

 

 Problem  Essential hypertension     I10     Active  05033961

 

 Problem  Type 2 diabetes mellitus without complication, without long-term 
current use of insulin     E11.9     Active  198108264

 

 Problem  Anxiety     F41.9     Active  05035627

 

 Problem  Lumbago with sciatica, left side     M54.42     Active  778315524

 

 Problem  Arthritis     M19.90     Active  7729387

 

 Problem  Iron deficiency anemia, unspecified iron deficiency anemia type     
D50.9     Active  49116715

 

 Problem  Venous insufficiency     I87.2     Active  38086902

 

 Problem  Gastroesophageal reflux disease, esophagitis presence not specified  
   K21.9     Active  874222290

 

 Problem  Seasonal allergic rhinitis due to pollen     J30.1     Active  
84357985

 

 Problem  Anemia of chronic disease     D63.8     Active  085180413

 

 Problem  Chronic kidney disease, stage 3 (moderate)     N18.3     Active  
520669923







ALLERGIES

No Information



ENCOUNTERS







 Encounter  Location  Date  Diagnosis

 

 [place] [id number] [place] [address] 27546-
[phone]  04 Oct, 2018  Essential hypertension I10

 

 [place] [id number] [place] [address] 78883-
[phone]  06 Sep, 2018  Essential hypertension I10 and Arthritis M19.90

 

 [facility] [place] [id number] [place] [address] 32187-
[phone]  28 Aug, 2018   

 

 [place] [id number] [place] [address] 37460-
[phone]  16 Aug, 2018   

 

 [place] [id number] [place] [address] 61449-
[phone]  06 Aug, 2018   

 

 [place] [address], [place] [address]-
0818    Essential hypertension I10

 

 [facility]  [id number] [place] [address] [phone]-
[phone]    Type 2 diabetes mellitus without complication, without long-
term current use of insulin E11.9 ; Essential hypertension I10 and Anemia of 
chronic disease D63.8

 

 [facility]  [id number] [place] [address] 50148-
6295    Arthritis M19.90 and Essential hypertension I10

 

 [facility]  [id number] [place] [address] [phone]-
1604  15 May, 2018   

 

 [facility]  [id number] [place] [address] 28982-
3081  15 May, 2018   

 

 [facility]  [id number] [place] [address] 11608-
8167  15 May, 2018  Medicare annual wellness visit, initial Z00.00 ; Type 2 
diabetes mellitus without complication, without long-term current use of 
insulin E11.9 ; Chronic kidney disease, stage 3 (moderate) N18.3 ; Essential 
hypertension I10 ; Gastroesophageal reflux disease, esophagitis presence not 
specified K21.9 ; Anxiety F41.9 ; Arthritis M19.90 and Encounter for 
immunization Z23

 

 [facility]  [address] [address]-
0464  03 May, 2018  Essential hypertension I10

 

 [facility]  [address] [place] [address] 88549-
4864    Arthritis M19.90

 

 [facility]  [address] [place] [address] 58871-
5290     

 

 [facility]  [id number] [place] [address] 51580-
[phone]     

 

 [facility]  [address] [address]-
7266    Essential hypertension I10

 

 [facility]  [id number] [place] [address] [phone]
[phone]  27 Mar, 2018  Dysuria R30.0 and Vaginal candida B37.3

 

 [place] [address] [address]54-
9389  08 Mar, 2018  Arthritis M19.90

 

 [facility]  [address] [address]82-
[phone]     

 

 [place] [address] 7[address]70-
4380    Type 2 diabetes mellitus without complication, without long-
term current use of insulin E11.9 ; Lumbago with sciatica, left side M54.42 ; 
Arthritis M19.90 ; Iron deficiency anemia, unspecified iron deficiency anemia 
type D50.9 and BMI 40.0-44.9, adult Z68.41

 

 [place] [address] [address]-
7793     

 

 [place] [address] [address]-
4375     

 

 [place] [address] [address]-
2850    Arthritis M19.90 and Anxiety F41.9

 

 [place] [address] 57553-
[phone]     

 

 [place] [address] 23086-
1886  18 Dec, 2017  Anxiety F41.9

 

 [place] [address] 38498-
6033     

 

 [place] [address] 25114-
8303     

 

 [place] [address] [address]10-
7586    Vaginal yeast infection B37.3

 

 [place] [address] 02849-
0621    Anxiety F41.9

 

 [facility]  [id number] [place] [address] 16708-
4530    Type 2 diabetes mellitus without complication, without long-
term current use of insulin E11.9

 

 [facility]  [id number] [place] [address] 43062-
2314     

 

 [facility]  [id number] [place] [address] [phone]-
1624     

 

 [facility]  [id number] [place] [address] 11577-
1078  24 Oct, 2017  Arthritis M19.90

 

 [facility]  [id number] [place] [address] 28331-
5113  16 Oct, 2017   

 

 [place] [id number] [place] [address] 79578-
2540  04 Oct, 2017   

 

 [facility]  [id number] [place] [address] 30232-
8963  05 Sep, 2017   

 

 [facility]  [id number] [place] [address] 82688-
3789  25 Aug, 2017  Venous insufficiency I87.2 and Venous stasis dermatitis of 
right lower extremity I87.2

 

 [place] [address] [place] [address] 46460-
2087  21 Aug, 2017  Essential hypertension I10

 

 [facility] [place] [id number] [place] [address] 29660-
2482     

 

 [facility]  [address] [place] [address] 09809-
0832    Type 2 diabetes mellitus without complication, without long-
term current use of insulin E11.9 and Essential hypertension I10

 

 [facility] [place] [address] [place] [address] 91459-
9738    Essential hypertension I10 and Type 2 diabetes mellitus 
without complication, without long-term current use of insulin E11.9

 

 [facility] [place] [id number] [place] [address] 60359-
9615  18 2017  Chronic kidney disease, stage 3 (moderate) N18.3 ; Anemia 
of chronic disease D63.8 and Type 2 diabetes mellitus without complication, 
without long-term current use of insulin E11.9

 

 [facility] [place] [id number] [place] [address] 16792-
0725  14 2017  Type 2 diabetes mellitus without complication, without long-
term current use of insulin E11.9 ; Iron deficiency anemia secondary to 
inadequate dietary iron intake D50.8 ; Arthritis M19.90 and Lumbago with 
sciatica, left side M54.42

 

 [place] [address] 91944-
1597    Arthritis M19.90 and Anxiety F41.9

 

 [place] [id number] [place] [address] 95191-
5599    Type 2 diabetes mellitus without complication, without long-
term current use of insulin E11.9 and Essential hypertension I10

 

 [facility] [place] [id number] [place] [address] 66605-
1315  22 May, 2017  Anxiety F41.9

 

 [place] [address] 62746-
3197  18 May, 2017  Monilial rash B37.2

 

 [facility] [place] [id number] [place] [address] 92483-
8300  16 May, 2017   

 

 [place] [id number] [place] [address] 91429-
8226  15 May, 2017   

 

 [place] [id number] [place] [address] 10794-
5733  11 May, 2017   

 

 [place] [address] 29343-
7453  11 May, 2017  Gastroesophageal reflux disease, esophagitis presence not 
specified K21.9

 

 [place] [id number] [place] [address] 50570-
9958  09 May, 2017  Arthritis M19.90

 

 [facility]  [id number] [place] [address] 47061-
6894  09 May, 2017  Anxiety F41.9 ; Arthritis M19.90 and Essential hypertension 
I10

 

 [place] [id number] [place] [address] 52694-
8034  05 May, 2017  Essential hypertension I10 and Anxiety F41.9

 

 [place] [address] 01312-
5560     

 

 [place] [id number] [place] [address] 29758-
2826    Arthritis M19.90 and Anxiety F41.9

 

 [place] [id number] [place] [address] 51385-
2887    Type 2 diabetes mellitus without complication, without long-
term current use of insulin E11.9 ; Cellulitis of right lower extremity L03.115 
and Arthritis M19.90

 

 [place] [id number] [place] [address] 21591-
5751  28 Mar, 2017   

 

 [place] [id number] [place] [address] 61234-
4396  20 Mar, 2017  Type 2 diabetes mellitus without complication, without long-
term current use of insulin E11.9 ; Bronchitis J40 and Arthritis M19.90

 

 [facility] [place] [id number] [place] [address] 07788-
0018  16 Mar, 2017   

 

 [place] [id number] [place] [address] 25712-
4868    Anxiety F41.9

 

 [facility]  [id number] [place] [address] 60277
-[phone]    Dysuria R30.0 ; Acute cystitis with hematuria N30.01 and 
Vaginal yeast infection B37.3

 

 [place] [id number] [place] [address] 79979-
9596    Arthritis M19.90

 

 [facility]  [id number] [place] [address] 60149
-[phone]    Strep pharyngitis J02.0 and Sore throat J02.9

 

 [place] [address] [address]20-
9446     

 

 [place] [address] 93532-
7532    Type 2 diabetes mellitus without complication, without long-
term current use of insulin E11.9

 

 [place] [id number] [place] [address] 62918-
3476  14 Dec, 2016   

 

 [place] [address] 82893-
9490  13 Dec, 2016  Seasonal allergic rhinitis due to pollen J30.1

 

 [facility] [place] [facility]  [address] [address]67
[phone]  09 Dec, 2016  Impacted cerumen of right ear H61.21 and Seasonal allergic 
rhinitis due to pollen J30.1

 

 [place] [id number] [place] [address] 79160-
5060  09 Dec, 2016   

 

 [place] [address] 69873-
0343  05 Dec, 2016   

 

 [place] [id number] [place] [address] 01050-
1187    Type 2 diabetes mellitus without complication, without long-
term current use of insulin E11.9 ; Anxiety F41.9 ; Essential hypertension I10 
and Encounter for immunization Z23

 

 [facility] [place] [id number] [place] [address] 94522-
7237    Essential hypertension I10

 

 [place] [address] 93459-
5063     

 

 [place] [address] 32542-
7225     

 

 [place] [address] [place] [address]-
3777    Essential hypertension I10

 

 [facility]  [address] [address]-
5464     

 

 [facility]  [address] [address]-
5441  23 Sep, 2016   

 

 [facility]  [address] [address]-
3215  01 Sep, 2016   

 

 [facility]  [address] [address]-
5343  30 Aug, 2016   

 

 [facility]  [address] [place] [address] 49345-
3781  24 Aug, 2016   

 

 [facility]  [address] [address]-
0981  22 Aug, 2016   

 

 [facility]  [address] [address]-
0420  22 Aug, 2016  Type 2 diabetes mellitus without complication, without long-
term current use of insulin E11.9 ; Essential hypertension I10 ; Acute non-
recurrent maxillary sinusitis J01.00 ; Arthritis M19.90 ; Lumbago with sciatica
, left side M54.42 ; Other chronic pain G89.29 and Anxiety F41.9







IMMUNIZATIONS

No Known Immunizations



SOCIAL HISTORY

Never Assessed



REASON FOR VISIT

Controlled Med Refill



PLAN OF CARE





VITAL SIGNS





MEDICATIONS







 Medication  Instructions  Dosage  Frequency  Start Date  End Date  Duration  
Status

 

 Alprazolam 0.5 MG  Orally Three times a day  1 tablet  8h        30 days  
Active







RESULTS

No Results



PROCEDURES

No Known procedures



INSTRUCTIONS





MEDICATIONS ADMINISTERED

No Known Medications



MEDICAL (GENERAL) HISTORY







 Type  Description  Date

 

 Medical History  type II diabetes   

 

 Medical History  hypertension   

 

 Medical History  Arthritis   

 

 Medical History  skin cancer-scalp   

 

 Surgical History  hysterectomy   

 

 Surgical History  skin cancer removal-scalp   

 

 Hospitalization History  Surgery(s) only   

 

 Hospitalization History  dehydration  2017

 

 Hospitalization History  bronchitis  2107

## 2019-02-08 NOTE — XMS REPORT
[facility]

 Created on: 2017



Sandrita Stoddard

External Reference #: [id number]

: 1934

Sex: Female



Demographics







 Address  [address] [place]  [address]

 

 Preferred Language  Unknown

 

 Marital Status  Unknown

 

 Christian Affiliation  Unknown

 

 Race  Unknown

 

 Ethnic Group  Unknown





Author







 Author  YULISA RANGEL

 

 [facility]

 

 Address  [address] [place]  [address]



 

 Phone  [phone]







Care Team Providers







 Care Team Member Name  Role  Phone

 

 YULISA RANGEL  Unavailable  [phone]







PROBLEMS







 Type  Condition  ICD9-CM Code  OZH16-FX Code  Onset Dates  Condition Status  
SNOMED Code

 

 Problem  Arthritis     M19.90     Active  3613337

 

 Problem  Anxiety     F41.9     Active  94660799

 

 Problem  Essential hypertension     I10     Active  59826202

 

 Problem  Lumbago with sciatica, left side     M54.42     Active  327627660

 

 Problem  Venous insufficiency     I87.2     Active  52984525

 

 Problem  Anemia of chronic disease     D63.8     Active  484042489

 

 Problem  Seasonal allergic rhinitis due to pollen     J30.1     Active  
89795116

 

 Problem  Type 2 diabetes mellitus without complication, without long-term 
current use of insulin     E11.9     Active  004547242

 

 Problem  Chronic kidney disease, stage 3 (moderate)     N18.3     Active  
778389559

 

 Problem  Gastroesophageal reflux disease, esophagitis presence not specified  
   K21.9     Active  220838421







ALLERGIES

No Information



SOCIAL HISTORY

Never Assessed



PLAN OF CARE





VITAL SIGNS





MEDICATIONS







 Medication  Instructions  Dosage  Frequency  Start Date  End Date  Duration  
Status

 

 Lotrisone 1-0.05 %  Externally Twice a day  1 application to affected area  
12h  15 May, 2017        Active







RESULTS

No Results



PROCEDURES

No Known procedures



IMMUNIZATIONS

No Known Immunizations



MEDICAL (GENERAL) HISTORY







 Type  Description  Date

 

 Medical History  type II diabetes   

 

 Medical History  hypertension   

 

 Medical History  Arthritis   

 

 Medical History  skin cancer-scalp   

 

 Surgical History  hysterectomy   

 

 Surgical History  skin cancer removal-scalp   

 

 Hospitalization History  Surgery(s) only   

 

 Hospitalization History  dehydration  2017

 

 Hospitalization History  bronchitis  2107

## 2019-02-08 NOTE — XMS REPORT
[facility]

 Created on: 2018



Richi Sandrita

External Reference #: [id number]

: 1934

Sex: Female



Demographics







 Address  [address] [place]  [address]

 

 Preferred Language  Unknown

 

 Marital Status  Unknown

 

 [employer] Affiliation  Unknown

 

 Race  Unknown

 

 Ethnic Group  Unknown





Author







 Author  YULISA RANGEL

 

 [facility]

 

 Address  [address] [place]  [address]



 

 Phone  [phone]







Care Team Providers







 Care Team Member Name  Role  Phone

 

 YULISA RANGEL  Unavailable  [phone]







PROBLEMS







 Type  Condition  ICD9-CM Code  JVS09-HZ Code  Onset Dates  Condition Status  
SNOMED Code

 

 Problem  Essential hypertension     I10     Active  09062894

 

 Problem  Type 2 diabetes mellitus without complication, without long-term 
current use of insulin     E11.9     Active  878906556

 

 Problem  Anxiety     F41.9     Active  78015976

 

 Problem  Lumbago with sciatica, left side     M54.42     Active  477799363

 

 Problem  Arthritis     M19.90     Active  3400772

 

 Problem  Iron deficiency anemia, unspecified iron deficiency anemia type     
D50.9     Active  48727226

 

 Problem  Venous insufficiency     I87.2     Active  02248461

 

 Problem  Gastroesophageal reflux disease, esophagitis presence not specified  
   K21.9     Active  679839554

 

 Problem  Seasonal allergic rhinitis due to pollen     J30.1     Active  
24587961

 

 Problem  Anemia of chronic disease     D63.8     Active  127216045

 

 Problem  Chronic kidney disease, stage 3 (moderate)     N18.3     Active  
361367034







ALLERGIES

No Information



ENCOUNTERS







 Encounter  Location  Date  Diagnosis

 

 [place] [address] [place] [address]-
6032  06 Sep, 2018  Essential hypertension I10 and Arthritis M19.90

 

 [place] [address] [address]-
6754  28 Aug, 2018   

 

 [place] [address] [place] [id number] [place] [address] 24182-
5660  16 Aug, 2018   

 

 [place] [address] [place] [id number] [place] [address] 85159-
5578  06 Aug, 2018   

 

 [place] [address] [place] [address] 32859-
7424    Essential hypertension I10

 

 [place] [address] [place] [id number] [place] [address] 48988-
5287    Type 2 diabetes mellitus without complication, without long-
term current use of insulin E11.9 ; Essential hypertension I10 and Anemia of 
chronic disease D63.8

 

 [place] [address] [place] [id number] [place] [address] 89683-
9254    Arthritis M19.90 and Essential hypertension I10

 

 [place] [address] [address]-
8363  15 May, 2018   

 

 [place] [address] [address]-
2815  15 May, 2018   

 

 [place] [address] [address]-
5741  15 May, 2018  Medicare annual wellness visit, initial Z00.00 ; Type 2 
diabetes mellitus without complication, without long-term current use of 
insulin E11.9 ; Chronic kidney disease, stage 3 (moderate) N18.3 ; Essential 
hypertension I10 ; Gastroesophageal reflux disease, esophagitis presence not 
specified K21.9 ; Anxiety F41.9 ; Arthritis M19.90 and Encounter for 
immunization Z23

 

 [place] [address] [address]-
5718  03 May, 2018  Essential hypertension I10

 

 [place] [address] [address]-
3654    Arthritis M19.90

 

 [place] [address] [place] [id number] [place] [address] 39336-
2597     

 

 [place] [address] [address]-
9563     

 

 [place] [address] [address]-
9184    Essential hypertension I10

 

 [facility]  [address] [place] [id number] [place] [address] 01432
[phone]  27 Mar, 2018  Dysuria R30.0 and Vaginal candida B37.3

 

 [place] [address] [place] [id number] [place] [address] 90647-
6628  08 Mar, 2018  Arthritis M19.90

 

 [place] [address] [address]-
0848     

 

 [place] [address] [place] [id number] [place] [address] 97562-
6879    Type 2 diabetes mellitus without complication, without long-
term current use of insulin E11.9 ; Lumbago with sciatica, left side M54.42 ; 
Arthritis M19.90 ; Iron deficiency anemia, unspecified iron deficiency anemia 
type D50.9 and BMI 40.0-44.9, adult Z68.41

 

 [place] [address] [address]-
5876     

 

 [place] [address] [address]-
1092     

 

 [place] [address] [address]-
5834    Arthritis M19.90 and Anxiety F41.9

 

 [place] [address] [address]-
9529     

 

 [place] [address] [place] [id number] [place] [address] 13021-
0758  18 Dec, 2017  Anxiety F41.9

 

 [place] [address] [address]-
1792     

 

 [place] [address] [place] [id number] [place] [address] 27760-
7161     

 

 [place] [address] [address]-
4612  17 2017  Vaginal yeast infection B37.3

 

 [place] [address] [place] [id number] [place] [address] 68907-
4272  14 2017  Anxiety F41.9

 

 [place] [address] [address]97-
1149    Type 2 diabetes mellitus without complication, without long-
term current use of insulin E11.9

 

 [place] [address] [place] [id number] [place] [address] 25449-
6635     

 

 [facility]  [address] [place] [id number] [place] [address] 14503-
3347     

 

 [facility]  [address] [place] [id number] [place] [address] 13561-
1692  24 Oct, 2017  Arthritis M19.90

 

 [place] [address] [place] [address] [place] [address] 78773-
7719  16 Oct, 2017   

 

 [place] [address] [place] [id number] [place] [address] 33192-
4497  04 Oct, 2017   

 

 [place] [address] [place] [id number] [place] [address] 66991-
3500  05 Sep, 2017   

 

 [place] [address] [place] [id number] [place] [address] 94037-
6750  25 Aug, 2017  Venous insufficiency I87.2 and Venous stasis dermatitis of 
right lower extremity I87.2

 

 [place] [address] [place] [address] [place] [address] 40800-
4496  21 Aug, 2017  Essential hypertension I10

 

 [place] [address] [place] [address] [place] [address] 95282-
0543     

 

 [place] [address] [place] [address] [place] [address] 24631-
0504    Type 2 diabetes mellitus without complication, without long-
term current use of insulin E11.9 and Essential hypertension I10

 

 [place] [address] [place] [id number] [place] [address] 45136-
8831    Essential hypertension I10 and Type 2 diabetes mellitus 
without complication, without long-term current use of insulin E11.9

 

 [place] [address] [address]-
0100    Chronic kidney disease, stage 3 (moderate) N18.3 ; Anemia 
of chronic disease D63.8 and Type 2 diabetes mellitus without complication, 
without long-term current use of insulin E11.9

 

 [place] [address] [address]-
3911  14 2017  Type 2 diabetes mellitus without complication, without long-
term current use of insulin E11.9 ; Iron deficiency anemia secondary to 
inadequate dietary iron intake D50.8 ; Arthritis M19.90 and Lumbago with 
sciatica, left side M54.42

 

 [place] [address] 9[address]-
4246    Arthritis M19.90 and Anxiety F41.9

 

 [place] [address] [address]-
2000    Type 2 diabetes mellitus without complication, without long-
term current use of insulin E11.9 and Essential hypertension I10

 

 [address] [address]-
6386  22 May, 2017  Anxiety F41.9

 

 [place] [address] [address]-
3771  18 May, 2017  Monilial rash B37.2

 

 [place] [address] [address]-
7078  16 May, 2017   

 

 [place] [address] [place] [address] [place] [address] 25327-
6269  15 May, 2017   

 

 [place] [address] [address]-
0826  11 May, 2017   

 

 [place] [address] [address]-
1950  11 May, 2017  Gastroesophageal reflux disease, esophagitis presence not 
specified K21.9

 

 [place] [address] 7[address]-
3314  09 May, 2017  Arthritis M19.90

 

 [place] [address] [address]-
7413  09 May, 2017  Anxiety F41.9 ; Arthritis M19.90 and Essential hypertension 
I10

 

 [place] [address] [place] [id number] [place] [address] 15489-
3980  05 May, 2017  Essential hypertension I10 and Anxiety F41.9

 

 [place] [address] [place] [id number] [place] [address] 57271-
2378     

 

 [place] [address] [address]-
9977    Arthritis M19.90 and Anxiety F41.9

 

 [place] [address] [address]-
0591    Type 2 diabetes mellitus without complication, without long-
term current use of insulin E11.9 ; Cellulitis of right lower extremity L03.115 
and Arthritis M19.90

 

 [place] [address] [place] [id number] [place] [address] 96932-
9939  28 Mar, 2017   

 

 [place] [address] [address]-
7014  20 Mar, 2017  Type 2 diabetes mellitus without complication, without long-
term current use of insulin E11.9 ; Bronchitis J40 and Arthritis M19.90

 

 [place] [address] [place] [address] [place] [address] 63970-
6698  16 Mar, 2017   

 

 [place] [address] [place] [address] [place] [address] 16404-
7028    Anxiety F41.9

 

 [facility]  [address] [place] [address]
-[phone]    Dysuria R30.0 ; Acute cystitis with hematuria N30.01 and 
Vaginal yeast infection B37.3

 

 [place] [address] [place] [address] [place] [address] 09061-
8806    Arthritis M19.90

 

 [facility]  [address] [place] [address] [place] [address] 09265
[phone]    Strep pharyngitis J02.0 and Sore throat J02.9

 

 [facility]  [address] [place] [address] [place] [address] 23013-
2369     

 

 [place] [address] [address]-
7582    Type 2 diabetes mellitus without complication, without long-
term current use of insulin E11.9

 

 [place] [address] 9[address]-
4381  14 Dec, 2016   

 

 [place] [address] [address]-
5405  13 Dec, 2016  Seasonal allergic rhinitis due to pollen J30.1

 

 [facility] [place] [facility]  [address] [address]
-4966  09 Dec, 2016  Impacted cerumen of right ear H61.21 and Seasonal allergic 
rhinitis due to pollen J30.1

 

 [place] [address] [address]-
7303  09 Dec, 2016   

 

 [place] [address] [address]-
5261  05 Dec, 2016   

 

 [place] [address] [address]-
0921    Type 2 diabetes mellitus without complication, without long-
term current use of insulin E11.9 ; Anxiety F41.9 ; Essential hypertension I10 
and Encounter for immunization Z23

 

 [place] [address] [place] [id number] [place] [address] 12188-
1822    Essential hypertension I10

 

 [place] [address] [place] [address] [place] [address] 38631-
4096     

 

 [place] [address] [address]-
6398     

 

 [place] [address] [address]-
4466    Essential hypertension I10

 

 [place] [address] [place] [address]-
0686     

 

 [facility]  [address] [place] [id number] [address]-
5452  23 Sep, 2016   

 

 [facility]  [id number] [address]-
3054  01 Sep, 2016   

 

 [facility]  [id number] [address]-
0028  30 Aug, 2016   

 

 [facility]  [address] [address]-
1617  24 Aug, 2016   

 

 [facility]  [address] [address]1784-
9206  22 Aug, 2016   

 

 [facility]  [address] [address]-
1749  22 Aug, 2016  Type 2 diabetes mellitus without complication, without long-
term current use of insulin E11.9 ; Essential hypertension I10 ; Acute non-
recurrent maxillary sinusitis J01.00 ; Arthritis M19.90 ; Lumbago with sciatica
, left side M54.42 ; Other chronic pain G89.29 and Anxiety F41.9







IMMUNIZATIONS

No Known Immunizations



SOCIAL HISTORY

Never Assessed



REASON FOR VISIT

Requests return call



PLAN OF CARE





VITAL SIGNS





MEDICATIONS

Unknown Medications



RESULTS

No Results



PROCEDURES

No Known procedures



INSTRUCTIONS





MEDICATIONS ADMINISTERED

No Known Medications



MEDICAL (GENERAL) HISTORY







 Type  Description  Date

 

 Medical History  type II diabetes   

 

 Medical History  hypertension   

 

 Medical History  Arthritis   

 

 Medical History  skin cancer-scalp   

 

 Surgical History  hysterectomy   

 

 Surgical History  skin cancer removal-scalp   

 

 Hospitalization History  Surgery(s) only   

 

 Hospitalization History  dehydration  2017

 

 Hospitalization History  bronchitis  2107

## 2019-02-08 NOTE — XMS REPORT
[facility]

 Created on: 2017



Sandrita Stoddard

External Reference #: [id number]

: 1934

Sex: Female



Demographics







 Address  [address] [place]  [address]

 

 Preferred Language  Unknown

 

 Marital Status  Unknown

 

 [employer] Affiliation  Unknown

 

 Race  Unknown

 

 Ethnic Group  Unknown





Author







 Author  YULISA RANGEL

 

 [facility]

 

 Address  [address] [place]  [address]



 

 Phone  [phone]







Care Team Providers







 Care Team Member Name  Role  Phone

 

 YULISA RANGEL  Unavailable  [phone]







PROBLEMS







 Type  Condition  ICD9-CM Code  NSL05-GN Code  Onset Dates  Condition Status  
SNOMED Code

 

 Problem  Type 2 diabetes mellitus without complication, without long-term 
current use of insulin     E11.9     Active  789227117

 

 Problem  Essential hypertension     I10     Active  40333816

 

 Problem  Anxiety     F41.9     Active  56379449

 

 Problem  Arthritis     M19.90     Active  0243633

 

 Problem  Lumbago with sciatica, left side     M54.42     Active  902484300







ALLERGIES

Unknown Allergies



SOCIAL HISTORY

No smoking Hx information available



PLAN OF CARE





VITAL SIGNS





MEDICATIONS







 Medication  Instructions  Dosage  Frequency  Start Date  End Date  Duration  
Status

 

 Clotrimazole 1 %  Externally Twice a day  1 application to affected area  12h  
23 Sep, 2016  21 Oct, 2016  28 day(s)  Active







RESULTS

No Results



PROCEDURES

No Known procedures



IMMUNIZATIONS

No Known Immunizations

## 2019-02-08 NOTE — XMS REPORT
[facility]

 Created on: 2018



Sandrita Stoddard

External Reference #: [id number]

: 1934

Sex: Female



Demographics







 Address  [address] [place]  [address]

 

 Preferred Language  Unknown

 

 Marital Status  Unknown

 

 Buddhism Affiliation  Unknown

 

 Race  Unknown

 

 Ethnic Group  Unknown





Author







 Author  YULISA RANGEL

 

 Organization  [facility]

 

 Address  [address] [place]  [address]



 

 Phone  [phone]







Care Team Providers







 Care Team Member Name  Role  Phone

 

 YULISA RANGEL  Unavailable  [phone]







PROBLEMS







 Type  Condition  ICD9-CM Code  SSZ64-LZ Code  Onset Dates  Condition Status  
SNOMED Code

 

 Problem  Essential hypertension     I10     Active  56418933

 

 Problem  Type 2 diabetes mellitus without complication, without long-term 
current use of insulin     E11.9     Active  329346536

 

 Problem  Anxiety     F41.9     Active  70700697

 

 Problem  Lumbago with sciatica, left side     M54.42     Active  148110091

 

 Problem  Arthritis     M19.90     Active  7823778

 

 Problem  Iron deficiency anemia, unspecified iron deficiency anemia type     
D50.9     Active  72887267

 

 Problem  Venous insufficiency     I87.2     Active  15605318

 

 Problem  Gastroesophageal reflux disease, esophagitis presence not specified  
   K21.9     Active  598715333

 

 Problem  Seasonal allergic rhinitis due to pollen     J30.1     Active  
57964198

 

 Problem  Anemia of chronic disease     D63.8     Active  328137376

 

 Problem  Chronic kidney disease, stage 3 (moderate)     N18.3     Active  
167506855







ALLERGIES

No Known Allergies



ENCOUNTERS







 Encounter  Location  Date  Diagnosis

 

 [place] [id number] [address]-
9115  28 Aug, 2018   

 

 [place] [id number] [place] [phone]-
[phone]  16 Aug, 2018   

 

 [facility]  [id number] [place] [address]7981-
[phone]  06 Aug, 2018   

 

 [place] [id number] [place] [phone]-
[phone]    Essential hypertension I10

 

 [place] [id number] [place] [address]-
9104    Type 2 diabetes mellitus without complication, without long-
term current use of insulin E11.9 ; Essential hypertension I10 and Anemia of 
chronic disease D63.8

 

 [facility]  [id number] [place] [address] 40242-
7314    Arthritis M19.90 and Essential hypertension I10

 

 [facility]  [id number] [place] [address] 14735-
[phone]  15 May, 2018   

 

 [facility]  [address] [address]-
4042  15 May, 2018   

 

 [facility]  [id number] [place] [address] 59526-
5041  15 May, 2018  Medicare annual wellness visit, initial Z00.00 ; Type 2 
diabetes mellitus without complication, without long-term current use of 
insulin E11.9 ; Chronic kidney disease, stage 3 (moderate) N18.3 ; Essential 
hypertension I10 ; Gastroesophageal reflux disease, esophagitis presence not 
specified K21.9 ; Anxiety F41.9 ; Arthritis M19.90 and Encounter for 
immunization Z23

 

 [facility]  [id number] [place] [address] 31122-
2460  03 May, 2018  Essential hypertension I10

 

 [place] [id number] [place] [address] 88277-
3563    Arthritis M19.90

 

 [facility]  [id number] [place] [address] 67850-
2793     

 

 [facility]  [id number] [place] [address] 96534-
4266     

 

 [facility]  [id number] [place] [address] 68526-
[phone]    Essential hypertension I10

 

 [facility]  [id number] [place] [address] [phone]
[phone]  27 Mar, 2018  Dysuria R30.0 and Vaginal candida B37.3

 

 [facility]  [id number] [place] [address] 19504-
3646  08 Mar, 2018  Arthritis M19.90

 

 [facility]  [id number] [place] [address] 18487-
1374     

 

 [place] [address] [address]-
2112    Type 2 diabetes mellitus without complication, without long-
term current use of insulin E11.9 ; Lumbago with sciatica, left side M54.42 ; 
Arthritis M19.90 ; Iron deficiency anemia, unspecified iron deficiency anemia 
type D50.9 and BMI 40.0-44.9, adult Z68.41

 

 [place] [id number] [place] [address] 11074-
2846     

 

 [place] [address] [address]-
8128     

 

 [place] [address] [address]-
2537    Arthritis M19.90 and Anxiety F41.9

 

 [place] [id number] [place] [address] 45470-
9525     

 

 [place] [id number] [place] [address] 40951-
9473  18 Dec, 2017  Anxiety F41.9

 

 [place] [id number] [place] [address] 68725-
1641     

 

 [place] [id number] [place] [address] 63106-
5729     

 

 [place] [address] [place] [address] 29937-
5676    Vaginal yeast infection B37.3

 

 [place] [address] 7[address]57-
9137  14 2017  Anxiety F41.9

 

 [place] [address] [id number] [place] [address] 65325-
2365  09 2017  Type 2 diabetes mellitus without complication, without long-
term current use of insulin E11.9

 

 [place] [address] [address]-
3854     

 

 [facility]  [id number] [place] [address] 36667-
4363     

 

 [facility]  [id number] [place] [address] 02504-
0062  24 Oct, 2017  Arthritis M19.90

 

 [place] [address] [place] [address] 45876-
4928  16 Oct, 2017   

 

 [place] [address] [place] [address] 20457-
7728  04 Oct, 2017   

 

 [place] [id number] [place] [address] 94638-
2845  05 Sep, 2017   

 

 [place] [id number] [place] [address] 70013-
6038  25 Aug, 2017  Venous insufficiency I87.2 and Venous stasis dermatitis of 
right lower extremity I87.2

 

 [place] [address] [address]-
9468  21 Aug, 2017  Essential hypertension I10

 

 [place] [id number] [place] [address] 13601-
0064     

 

 [place] [address] [place] [place] [address] 64669-
1058    Type 2 diabetes mellitus without complication, without long-
term current use of insulin E11.9 and Essential hypertension I10

 

 [place] [address] [address]-
0876    Essential hypertension I10 and Type 2 diabetes mellitus 
without complication, without long-term current use of insulin E11.9

 

 [place] [address] [place] [address] [address]-
8483    Chronic kidney disease, stage 3 (moderate) N18.3 ; Anemia 
of chronic disease D63.8 and Type 2 diabetes mellitus without complication, 
without long-term current use of insulin E11.9

 

 [place] [id number] [place] [address] 06953-
3077  14 2017  Type 2 diabetes mellitus without complication, without long-
term current use of insulin E11.9 ; Iron deficiency anemia secondary to 
inadequate dietary iron intake D50.8 ; Arthritis M19.90 and Lumbago with 
sciatica, left side M54.42

 

 [place] [address] [address]-
0840    Arthritis M19.90 and Anxiety F41.9

 

 [place] [address] 9[address]44-
1320    Type 2 diabetes mellitus without complication, without long-
term current use of insulin E11.9 and Essential hypertension I10

 

 [place] [address] 0[address]-
0995  22 May, 2017  Anxiety F41.9

 

 [place] [address] [address]-
2753  18 May, 2017  Monilial rash B37.2

 

 [place] [id number] [place] [address] 85752-
6712  16 May, 2017   

 

 [place] [address] 81741-
3446  15 May, 2017   

 

 [place] [address] [place] [id number] [place] [address] 64052-
9635  11 May, 2017   

 

 [place] [address] 7[address]-
9982  11 May, 2017  Gastroesophageal reflux disease, esophagitis presence not 
specified K21.9

 

 [place] [id number] [place] [address] 93530-
8297  09 May, 2017  Arthritis M19.90

 

 [place] [address] [place] [address] 28651-
6212  09 May, 2017  Anxiety F41.9 ; Arthritis M19.90 and Essential hypertension 
I10

 

 [place] [address] 98999-
0072  05 May, 2017  Essential hypertension I10 and Anxiety F41.9

 

 [facility]  [id number] [place] [address] 03959-
7461     

 

 [facility]  [address] [address]-
8664    Arthritis M19.90 and Anxiety F41.9

 

 [place] [address] [address]-
1403    Type 2 diabetes mellitus without complication, without long-
term current use of insulin E11.9 ; Cellulitis of right lower extremity L03.115 
and Arthritis M19.90

 

 [place] [address] [address]-
8397  28 Mar, 2017   

 

 [place] [address] [address]-
1236  20 Mar, 2017  Type 2 diabetes mellitus without complication, without long-
term current use of insulin E11.9 ; Bronchitis J40 and Arthritis M19.90

 

 [place] [address] [place] [id number] [place] [address] 57353-
5538  16 Mar, 2017   

 

 [place] [address] [address]-
2559    Anxiety F41.9

 

 [facility]  [id number] [place] [address] 88989
[phone]    Dysuria R30.0 ; Acute cystitis with hematuria N30.01 and 
Vaginal yeast infection B37.3

 

 [place] [address] [place] [address] 35858-
4478    Arthritis M19.90

 

 [facility]  [id number] [place] [address] 10421
-[phone]    Strep pharyngitis J02.0 and Sore throat J02.9

 

 [place] [address] [id number] [place] [address] 57378-
0236     

 

 [place] [id number] [place] [address] 04913-
6398    Type 2 diabetes mellitus without complication, without long-
term current use of insulin E11.9

 

 [place] [address] [place] [place] [address] 32679-
7553  14 Dec, 2016   

 

 [place] [address] [address]-
7410  13 Dec, 2016  Seasonal allergic rhinitis due to pollen J30.1

 

 [facility]  [id number] [place] [address] [phone]
[phone]  09 Dec, 2016  Impacted cerumen of right ear H61.21 and Seasonal allergic 
rhinitis due to pollen J30.1

 

 [place] [address] [place] [address] 81271-
3919  09 Dec, 2016   

 

 [place] [address] 70752-
3246  05 Dec, 2016   

 

 [place] [address] [id number] [place] [address] 06508-
0029    Type 2 diabetes mellitus without complication, without long-
term current use of insulin E11.9 ; Anxiety F41.9 ; Essential hypertension I10 
and Encounter for immunization Z23

 

 [place] [address] [place] [address] [place] [address] 76326-
6500    Essential hypertension I10

 

 [place] [address] [place] [address] [place] [address] 25156-
8437     

 

 [place] [address] [place] [address] [place] [address] 99040-
1892     

 

 [place] [address] 59430-
9541    Essential hypertension I10

 

 [place] [address] [place] [id number] [place] [address] 88865-
2222     

 

 [place] [address] 90465-
6316  23 Sep, 2016   

 

 [facility]  [address] [facility] [id number] [address]-
2183  01 Sep, 2016   

 

 [facility]  [address] [place] [id number] [address]-
6826  30 Aug, 2016   

 

 [facility]  [id number] [address]-
2149  24 Aug, 2016   

 

 [facility]  [address] [address]-
2628  22 Aug, 2016   

 

 [facility]  [address] [facility] [id number] [address]-
3467  22 Aug, 2016  Type 2 diabetes mellitus without complication, without long-
term current use of insulin E11.9 ; Essential hypertension I10 ; Acute non-
recurrent maxillary sinusitis J01.00 ; Arthritis M19.90 ; Lumbago with sciatica
, left side M54.42 ; Other chronic pain G89.29 and Anxiety F41.9







IMMUNIZATIONS

No Known Immunizations



SOCIAL HISTORY

Never Assessed



REASON FOR VISIT

Diabetes-RAISA daly, A1C,  pt. wasn't able to void for micro



PLAN OF CARE







 Activity  Details









  









 Follow Up  4 Months Reason:







VITAL SIGNS







 Height  56 in  2018

 

 Weight  169.4 lbs  2018

 

 Temperature  98.1 degrees Fahrenheit  2018

 

 Heart Rate  72 bpm  2018

 

 Respiratory Rate  18   2018

 

 BMI  37.97 kg/m2  2018

 

 Blood pressure systolic  138 mmHg  2018

 

 Blood pressure diastolic  62 mmHg  2018







MEDICATIONS







 Medication  Instructions  Dosage  Frequency  Start Date  End Date  Duration  
Status

 

 Omeprazole 20 MG  Orally Once a day  1 capsule  24h       30 day(s
)  Not-Taking

 

 Lisinopril 5 mg  Orally Once a day  1 tablet  24h           Active

 

 Alprazolam 0.5 MG  Orally Three times a day  1 tablet  8h        30 days  
Active

 

 Wheelchair -     use for ambulation  24h  09 May, 2017        Not-Taking

 

 ProAir  (90 Base) MCG/ACT  Inhalation every 4-6 hours as needed  2 
puffs as needed           30 days  Active

 

 Wheelchair -     use for mobility             Active

 

 Omeprazole 20  Orally Once a day  1 capsule  24h        30  Active

 

 Timolol Hemihydrate 0.5 %  Ophthalmic Twice a day  1 drop into affected eye  
12h           Active

 

 Monistat 1 Combo Pack 1200 & 2 MG & %  Vaginal Once a day  use _insert and 
cream  24h  20 2017        Not-Taking

 

 Blood Glucose Test Strip Test Strips  subcutaneously Once a day  1 test strip  
24h          Active

 

 Diflucan 150 MG     1 tablet     15 May, 2018        Not-Taking

 

 Metoprolol Tartrate 25     TAKE ONE TABLET BY MOUTH TWICE A DAY           90  
Active

 

 Wheelchair -     use for transportation  24h          Not-Taking

 

 Tylenol     1 tab              Active

 

 Glimepiride 4 MG  Orally Once a day  1 tablet with breakfast or the first main 
meal of the day  24h  18 2017     90 days  Active

 

 Tramadol-Acetaminophen 37.5-325 MG  Orally every 4 hrs  2 tablets as needed  
4h        28 days  Active

 

 Diflucan 150 MG     1 tablet     15 May, 2018     1 dose  Active







RESULTS

No Results



PROCEDURES







 Procedure  Date Ordered  Result  Body Site

 

 GLYCATED HEMOGLOBIN TEST  2018      

 

 [facility]      

 

 [facility] VISIT ESTABLISHED PATIENT  2018      

 

 MICROALBUMIN, SEMIQUANT  2018      







INSTRUCTIONS





MEDICATIONS ADMINISTERED

No Known Medications



MEDICAL (GENERAL) HISTORY







 Type  Description  Date

 

 Medical History  type II diabetes   

 

 Medical History  hypertension   

 

 Medical History  Arthritis   

 

 Medical History  skin cancer-scalp   

 

 Surgical History  hysterectomy   

 

 Surgical History  skin cancer removal-scalp   

 

 Hospitalization History  Surgery(s) only   

 

 Hospitalization History  dehydration  2017

 

 Hospitalization History  bronchitis  2107

## 2019-02-08 NOTE — XMS REPORT
[facility]

 Created on: 2016



Richi Sandrita

External Reference #: [id number]

: 1934

Sex: Female



Demographics







 Address  [address] [place]  [address]

 

 Home Phone  [phone]

 

 Preferred Language  Unknown

 

 Marital Status  Unknown

 

 Jain Affiliation  Unknown

 

 Race   or 

 

 Ethnic Group  Not  or 





Author







 Author  YULISA RANGEL

 

 Organization  [employer]

 

 Address  Unknown

 

 Phone  Unavailable







Care Team Providers







 Care Team Member Name  Role  Phone

 

 YULISA RANGEL  CP  Unavailable



                                                                



Allergies

          No Known Allergies                                                   
                                     



Problems

          





 Problem Type  Condition  Code  Onset Dates  Condition Status

 

 Problem  Essential hypertension  I10     Active

 

 Problem  Arthritis  M19.90     Active

 

 Problem  Type 2 diabetes mellitus without complication, without long-term 
current use of insulin  E11.9     Active

 

 Problem  Lumbago with sciatica, left side  M54.42     Active

 

 Problem  Anxiety  F41.9     Active



                                                                               
                                                 



Medications

          No Known Medications                                                 
                             



Results

          No Known Results                                                     
               



Summary Purpose

          [employer] Submission

## 2019-02-08 NOTE — XMS REPORT
[facility]

 Created on: 2018



Richi Sandrita

External Reference #: [id number]

: 1934

Sex: Female



Demographics







 Address  [address] [place]  [address]

 

 Preferred Language  Unknown

 

 Marital Status  Unknown

 

 Mandaeism Affiliation  Unknown

 

 Race  Unknown

 

 Ethnic Group  Unknown





Author







 Author  YULISA RANGEL

 

 [facility]

 

 Address  [address] [place]  [address]



 

 Phone  [phone]







Care Team Providers







 Care Team Member Name  Role  Phone

 

 YULISA RANGEL  Unavailable  [phone]







PROBLEMS







 Type  Condition  ICD9-CM Code  NJH78-GP Code  Onset Dates  Condition Status  
SNOMED Code

 

 Problem  Essential hypertension     I10     Active  63595927

 

 Problem  Type 2 diabetes mellitus without complication, without long-term 
current use of insulin     E11.9     Active  898478189

 

 Problem  Anxiety     F41.9     Active  84198788

 

 Problem  Lumbago with sciatica, left side     M54.42     Active  692529988

 

 Problem  Arthritis     M19.90     Active  6498532

 

 Problem  Iron deficiency anemia, unspecified iron deficiency anemia type     
D50.9     Active  24721112

 

 Problem  Venous insufficiency     I87.2     Active  55792544

 

 Problem  Gastroesophageal reflux disease, esophagitis presence not specified  
   K21.9     Active  917675324

 

 Problem  Seasonal allergic rhinitis due to pollen     J30.1     Active  
63166868

 

 Problem  Anemia of chronic disease     D63.8     Active  921047192

 

 Problem  Chronic kidney disease, stage 3 (moderate)     N18.3     Active  
596339455







ALLERGIES

No Information



ENCOUNTERS







 Encounter  Location  Date  Diagnosis

 

 [place] [id number] [place] [address]8551-
[phone]    Essential hypertension I10

 

 [place] [id number] [place] [address] 06519-
1770    Type 2 diabetes mellitus without complication, without long-
term current use of insulin E11.9 ; Essential hypertension I10 and Anemia of 
chronic disease D63.8

 

 [place] [id number] [place] [address] 62078-
4193    Arthritis M19.90 and Essential hypertension I10

 

 [place] [id number] [place] [address] 99512-
2699  15 May, 2018   

 

 [place] [id number] [place] [address] 25597-
7031  15 May, 2018   

 

 [facility]  [address] [address]-
9311  15 May, 2018  Medicare annual wellness visit, initial Z00.00 ; Type 2 
diabetes mellitus without complication, without long-term current use of 
insulin E11.9 ; Chronic kidney disease, stage 3 (moderate) N18.3 ; Essential 
hypertension I10 ; Gastroesophageal reflux disease, esophagitis presence not 
specified K21.9 ; Anxiety F41.9 ; Arthritis M19.90 and Encounter for 
immunization Z23

 

 [place] [id number] [place] [address] 02839-
6285  03 May, 2018  Essential hypertension I10

 

 [place] [address] [address]486-
9219    Arthritis M19.90

 

 [place] [address] [address]-
1873     

 

 [facility]  [id number] [place] [address] 81117-
3392     

 

 [place] [id number] [place] [address] 26844-
3450    Essential hypertension I10

 

 [facility] [place] [facility]  [id number] [place] [address] 29720
[phone]  27 Mar, 2018  Dysuria R30.0 and Vaginal candida B37.3

 

 [place] [id number] [place] [address] 26198-
8180  08 Mar, 2018  Arthritis M19.90

 

 [place] [id number] [place] [address] 57041-
7917     

 

 [place] [id number] [place] [address] 96519-
4452    Type 2 diabetes mellitus without complication, without long-
term current use of insulin E11.9 ; Lumbago with sciatica, left side M54.42 ; 
Arthritis M19.90 ; Iron deficiency anemia, unspecified iron deficiency anemia 
type D50.9 and BMI 40.0-44.9, adult Z68.41

 

 [facility]  [address] [address]-
0707     

 

 [facility]  [address] [address]-
6600     

 

 [facility]  [address] [address]359-
4903    Arthritis M19.90 and Anxiety F41.9

 

 [facility]  [address] [address]-
0608     

 

 [place] [address] [address]-
0739  18 Dec, 2017  Anxiety F41.9

 

 [place] [address] [address]11-
1077     

 

 [facility]  [address] [address]-
6674     

 

 [place] [address] 57371-
5853    Vaginal yeast infection B37.3

 

 [place] [id number] [place] [address] 60874-
7422    Anxiety F41.9

 

 [facility]  [address] [address]-
6525    Type 2 diabetes mellitus without complication, without long-
term current use of insulin E11.9

 

 [facility]  [id number] [place] [address] 86322-
4104  08 2017   

 

 [place] [address] 48668-
9970     

 

 [facility]  [id number] [place] [address] 78651-
4952  24 Oct, 2017  Arthritis M19.90

 

 [place] [address] [address]-
3221  16 Oct, 2017   

 

 [place] [address] [place] [address] 79498-
1710  04 Oct, 2017   

 

 [place] [id number] [place] [address] 28716-
4138  05 Sep, 2017   

 

 [place] [address] [place] [address] 69147-
7277  25 Aug, 2017  Venous insufficiency I87.2 and Venous stasis dermatitis of 
right lower extremity I87.2

 

 [place] [id number] [place] [address] 61987-
8068  21 Aug, 2017  Essential hypertension I10

 

 [place] [id number] [place] [address] 44069-
8207     

 

 [place] [address] [place] [address] 88771-
9006    Type 2 diabetes mellitus without complication, without long-
term current use of insulin E11.9 and Essential hypertension I10

 

 [place] [id number] [place] [address] 81515-
9697    Essential hypertension I10 and Type 2 diabetes mellitus 
without complication, without long-term current use of insulin E11.9

 

 [place] [address] [place] [address] 24584-
7201    Chronic kidney disease, stage 3 (moderate) N18.3 ; Anemia 
of chronic disease D63.8 and Type 2 diabetes mellitus without complication, 
without long-term current use of insulin E11.9

 

 [place] [address] [place] [address] 79103-
6850  14 2017  Type 2 diabetes mellitus without complication, without long-
term current use of insulin E11.9 ; Iron deficiency anemia secondary to 
inadequate dietary iron intake D50.8 ; Arthritis M19.90 and Lumbago with 
sciatica, left side M54.42

 

 [place] [id number] [place] [address] 25409-
9344    Arthritis M19.90 and Anxiety F41.9

 

 [facility]  [address] [place] [address] 02349-
4725    Type 2 diabetes mellitus without complication, without long-
term current use of insulin E11.9 and Essential hypertension I10

 

 [facility]  [address] [place] [address] 23032-
2845  22 May, 2017  Anxiety F41.9

 

 [facility]  [address] 6[address]-
5385  18 May, 2017  Monilial rash B37.2

 

 [facility]  [address] 73836-
8152  16 May, 2017   

 

 [facility]  [address] [place] [address] 08155-
2692  15 May, 2017   

 

 [facility]  [address] 79781-
5008  11 May, 2017   

 

 [facility]  [address] [place] [address] 22698-
1133  11 May, 2017  Gastroesophageal reflux disease, esophagitis presence not 
specified K21.9

 

 [facility]  [address] [place] [address] 05490-
3569  09 May, 2017  Arthritis M19.90

 

 [facility]  [address] [place] [address] 33927-
3962  09 May, 2017  Anxiety F41.9 ; Arthritis M19.90 and Essential hypertension 
I10

 

 [facility]  [address] [place] [address] 36337-
3007  05 May, 2017  Essential hypertension I10 and Anxiety F41.9

 

 [facility]  [address] [address]-
5772     

 

 [facility]  [address] [place] [address] 77122-
0496    Arthritis M19.90 and Anxiety F41.9

 

 [place] [address] [place] [address] 02007-
8624  11 2017  Type 2 diabetes mellitus without complication, without long-
term current use of insulin E11.9 ; Cellulitis of right lower extremity L03.115 
and Arthritis M19.90

 

 [place] [address] [place] [address] 82139-
8113  28 Mar, 2017   

 

 [place] [address] 67162-
4071  20 Mar, 2017  Type 2 diabetes mellitus without complication, without long-
term current use of insulin E11.9 ; Bronchitis J40 and Arthritis M19.90

 

 [place] [address] [address]43-
9579  16 Mar, 2017   

 

 [place] [address] [place] [address] 57988-
5896  24 2017  Anxiety F41.9

 

 [facility] [place] [address] [place] [address] 75108
-[phone]    Dysuria R30.0 ; Acute cystitis with hematuria N30.01 and 
Vaginal yeast infection B37.3

 

 [place] [address] [place] [address] 84746-
1542    Arthritis M19.90

 

 [facility] [place] [address] [place] [address] 63855
-[phone]    Strep pharyngitis J02.0 and Sore throat J02.9

 

 [place] [address] [place] [address] 73819-
7975     

 

 [place] [address] [place] [address] 83841-
9228    Type 2 diabetes mellitus without complication, without long-
term current use of insulin E11.9

 

 [place] [address] [place] [address] 44162-
7428  14 Dec, 2016   

 

 [place] [address] [place] [address] 34364-
7289  13 Dec, 2016  Seasonal allergic rhinitis due to pollen J30.1

 

 [facility]  [address] [address]
[phone]  09 Dec, 2016  Impacted cerumen of right ear H61.21 and Seasonal allergic 
rhinitis due to pollen J30.1

 

 [facility]  [address] 9[address]26-
4598  09 Dec, 2016   

 

 [facility]  [address] [address]21-
5976  05 Dec, 2016   

 

 [facility]  [address] 62311-
8667    Type 2 diabetes mellitus without complication, without long-
term current use of insulin E11.9 ; Anxiety F41.9 ; Essential hypertension I10 
and Encounter for immunization Z23

 

 [facility]  [address] [address]170-
5194    Essential hypertension I10

 

 [facility]  [address] 18415-
6444     

 

 [facility]  [address] 87371-
7464     

 

 [facility]  [address] [place] [address] 91784-
9305    Essential hypertension I10

 

 [facility]  [address] [place] [address] 41028-
5889     

 

 [facility]  [address] [place] [address] 77742-
9205  23 Sep, 2016   

 

 [facility]  [address] 80823-
5224  01 Sep, 2016   

 

 [facility]  [address] [place] [address] 76084-
9522  30 Aug, 2016   

 

 [facility]  [address] [address]122-
3402  24 Aug, 2016   

 

 [facility]  [address] [facility] [id number] [place] [address]-
1870  22 Aug, 2016   

 

 [facility]  [address] [facility] [id number] [place] [address]-
7908  22 Aug, 2016  Type 2 diabetes mellitus without complication, without long-
term current use of insulin E11.9 ; Essential hypertension I10 ; Acute non-
recurrent maxillary sinusitis J01.00 ; Arthritis M19.90 ; Lumbago with sciatica
, left side M54.42 ; Other chronic pain G89.29 and Anxiety F41.9







IMMUNIZATIONS

No Known Immunizations



SOCIAL HISTORY

Never Assessed



REASON FOR VISIT

Requests return call



PLAN OF CARE





VITAL SIGNS





MEDICATIONS

Unknown Medications



RESULTS

No Results



PROCEDURES

No Known procedures



INSTRUCTIONS





MEDICATIONS ADMINISTERED

No Known Medications



MEDICAL (GENERAL) HISTORY







 Type  Description  Date

 

 Medical History  type II diabetes   

 

 Medical History  hypertension   

 

 Medical History  Arthritis   

 

 Medical History  skin cancer-scalp   

 

 Surgical History  hysterectomy   

 

 Surgical History  skin cancer removal-scalp   

 

 Hospitalization History  Surgery(s) only   

 

 Hospitalization History  dehydration  2017

 

 Hospitalization History  bronchitis  2107

## 2019-02-08 NOTE — XMS REPORT
Continuity of Care Document

 Created on: 2019



ARIANA BEJARANO

External Reference #: [id number]

: 1934

Sex: Female



Demographics







 Address  [address]

[place]  [address]

 

 Home Phone  [phone] x

 

 Preferred Language  Unknown

 

 Marital Status  Unknown

 

 Hinduism Affiliation  Unknown

 

 Race  Unknown

 

 Ethnic Group  Unknown





Author







 Author  [facility]

 

 Organization  [facility]

 

 Address  Unknown

 

 Phone  Unavailable



              



Allergies

      





 Active            Description            Code            Type            
Severity            Reaction            Onset            Reported/Identified   
         Relationship to Patient            Clinical Status        

 

 Yes            diazepam            [id number]            Drug Allergy         
   Moderate            RASH                         2010                 
                 



                  



Medications

      



There is no data.                  



Problems

      





 Date Dx Coded            Attending            Type            Code            
Diagnosis            Diagnosed By        

 

 2010                         Ot            729.5                        
          

 

 2010                         Ot            490                          
        

 

 2010                         Ot            786.2                        
          

 

 2012                         Ot            785.1                        
          

 

 2015            FAISAL MILLER APRN            Ot            250.80     
       DIAB W OTH SPEC MANIFEST, TYPE II OR UNS                     

 

 2015            FAISAL MILLER APRN            Ot            403.90     
       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                     

 

 2015            FAISAL MILLER APRN            Ot            490        
    BRONCHITIS NOS                     

 

 2015            FAISAL MILLER APRN            Ot            585.9      
      CHRONIC KIDNEY DISEASE, UNSPECIFIED                     

 

 2015            FAISAL MILLER APRN            Ot            780.79     
       OTH MALAISE FATIGUE                     

 

 2015            FAISAL MILLER APRN            Ot            786.2      
      COUGH                     

 

 2015            FAISAL MILLER APRN            Ot            V58.69     
       OTH MED,LT,CURRENT USE                     

 

 2015                         Ot            V76.12                       
           

 

 2015                         Ot            V76.12                       
           

 

 2015            FAISAL MILLER APRN            Ot            466.0      
      ACUTE BRONCHITIS                     

 

 2015            FAISAL MILLER APRN            Ot            786.2      
      COUGH                     

 

 2017            SARA OSBORNE MD            Ot            E11.9      
      TYPE 2 DIABETES MELLITUS WITHOUT COMPLIC                     

 

 2017            SARA OSBORNE MD            Ot            E86.0      
      DEHYDRATION                     

 

 2017            SARA OSBORNE MD            Ot            I10        
    ESSENTIAL (PRIMARY) HYPERTENSION                     

 

 2017            SARA OSBORNE MD            Ot            J21.9      
      ACUTE BRONCHIOLITIS, UNSPECIFIED                     

 

 2017            SARA OSBORNE MD            Ot            J45.909    
        UNSPECIFIED ASTHMA, UNCOMPLICATED                     

 

 2017            SARA OSBORNE MD            Ot            K21.9      
      GASTRO-ESOPHAGEAL REFLUX DISEASE WITHOUT                     

 

 2017            SARA OSBORNE MD            Ot            R19.7      
      DIARRHEA, UNSPECIFIED                     

 

 2017            SARA OSBORNE MD            Ot            E11.9      
      TYPE 2 DIABETES MELLITUS WITHOUT COMPLIC                     

 

 2017            SARA OSBORNE MD            Ot            E86.0      
      DEHYDRATION                     

 

 2017            SARA OSBORNE MD            Ot            I10        
    ESSENTIAL (PRIMARY) HYPERTENSION                     

 

 2017            SARA OSBORNE MD            Ot            J21.9      
      ACUTE BRONCHIOLITIS, UNSPECIFIED                     

 

 2017            SARA OSBORNE MD            Ot            J45.909    
        UNSPECIFIED ASTHMA, UNCOMPLICATED                     

 

 2017            SARA OSBORNE MD            Ot            K21.9      
      GASTRO-ESOPHAGEAL REFLUX DISEASE WITHOUT                     

 

 2017            SARA OSBORNE MD            Ot            R19.7      
      DIARRHEA, UNSPECIFIED                     

 

 2019            SHIN FREITAS MD            Ot            E11.9       
     TYPE 2 DIABETES MELLITUS WITHOUT COMPLIC                     

 

 2019            SHIN FREITAS MD            Ot            I10         
   ESSENTIAL (PRIMARY) HYPERTENSION                     

 

 2019            SHIN FREITAS MD            Ot            K21.9       
     GASTRO-ESOPHAGEAL REFLUX DISEASE WITHOUT                     

 

 2019            SHIN FREITAS MD            Ot            M25.561     
       PAIN IN RIGHT KNEE                     

 

 2019            SHIN FREITAS MD            Ot            M25.562     
       PAIN IN LEFT KNEE                     

 

 2019            SHIN FREITAS MD            Ot            R40.2142    
        COMA SCALE, EYES OPEN, SPONTANEOUS, EMR                     

 

 2019            SHIN FREITAS MD            Ot            R40.2252    
        COMA SCALE, BEST VERBAL RESPONSE, ORIENT                     

 

 2019            SHIN FREITAS MD            Ot            R40.2362    
        COMA SCALE, BEST MOTOR RESPONSE, OBEYS C                     

 

 2019            SHIN FREITAS MD            Ot            W05.0XXA    
        FALL FROM NON-MOVING WHEELCHAIR, INITIAL                     

 

 2019            SHIN FREITAS MD            Ot            W22.09XA    
        STRIKING AGAINST OTHER STATIONARY OBJECT                     

 

 2019            HSIN FREITAS MD            Ot            Z79.82      
      LONG TERM (CURRENT) USE OF ASPIRIN                     

 

 2019            SHIN FREITAS MD            Ot            Z79.84      
      LONG TERM (CURRENT) USE OF ORAL HYPOGLYC                     

 

 2019            ZENA SHIN MULTANI Ot            Z85.828     
       PERSONAL HISTORY OF OTHER MALIGNANT NEOP                     

 

 2019            SHNI FREITAS MD, Ot            Z88.8       
     ALLERGY STATUS TO OT DRUG/MEDS/BIOL SUB                     

 

 2019            SHIN FREITAS MD, Ot            Z90.710     
       ACQUIRED ABSENCE OF BOTH CERVIX AND UTER                     

 

 2019            SHIN FREITAS MD            Ot            E11.9       
     TYPE 2 DIABETES MELLITUS WITHOUT COMPLIC                     

 

 2019            SHIN FREITAS MD            Ot            I10         
   ESSENTIAL (PRIMARY) HYPERTENSION                     

 

 2019            SHIN FREITAS MD, Ot            K21.9       
     GASTRO-ESOPHAGEAL REFLUX DISEASE WITHOUT                     

 

 2019            SHIN FREITAS MD, Ot            M25.561     
       PAIN IN RIGHT KNEE                     

 

 2019            SHIN FREITAS MD, Ot            M25.562     
       PAIN IN LEFT KNEE                     

 

 2019            SHIN FREITAS MD, Ot            R40.2142    
        COMA SCALE, EYES OPEN, SPONTANEOUS, EMR                     

 

 2019            SHIN FREITAS MD, Ot            R40.2252    
        COMA SCALE, BEST VERBAL RESPONSE, ORIENT                     

 

 2019            SHIN FREITAS MD, Ot            R40.2362    
        COMA SCALE, BEST MOTOR RESPONSE, OBEYS C                     

 

 2019            SHIN FREITAS MD            Ot            W05.0XXA    
        FALL FROM NON-MOVING WHEELCHAIR, INITIAL                     

 

 2019            SHIN FREITAS MD, Ot            W22.09XA    
        STRIKING AGAINST OTHER STATIONARY OBJECT                     

 

 2019            SHIN FREITAS MD, Ot            Z79.82      
      LONG TERM (CURRENT) USE OF ASPIRIN                     

 

 2019            SHIN FREITAS MD, Ot            Z79.84      
      LONG TERM (CURRENT) USE OF ORAL HYPOGLYC                     

 

 2019            SHIN FREITAS MD, Ot            Z85.828     
       PERSONAL HISTORY OF OTHER MALIGNANT NEOP                     

 

 2019            SHIN FREITAS MD, Ot            Z88.8       
     ALLERGY STATUS TO OT DRUG/MEDS/BIOL SUB                     

 

 2019            SHIN FREITAS MD, Ot            Z90.710     
       ACQUIRED ABSENCE OF BOTH CERVIX AND UTER                     



                                                                               
                                                       



Procedures

      



There is no data.                  



Results

      





 Test            Result            Range        









 Urine Culture, Routine - 17 15:27         









 Urine Culture, Routine            Note                      









 Bacterial blood culture - 17 03:35         









 Bacterial blood culture            NG             NRG        









 Complete blood count (CBC) with automated white blood cell (WBC) differential 
- 17 03:44         









 Blood leukocytes automated count (number/volume)            11.4 10*3/uL      
      4.3-11.0        

 

 Blood erythrocytes automated count (number/volume)            3.58 10*6/uL    
        4.35-5.85        

 

 Venous blood hemoglobin measurement (mass/volume)            10.3 g/dL        
    11.5-16.0        

 

 Blood hematocrit (volume fraction)            33 %            35-52        

 

 Automated erythrocyte mean corpuscular volume            93 [foz_us]          
  80-99        

 

 Automated erythrocyte mean corpuscular hemoglobin (mass per erythrocyte)      
      29 pg            25-34        

 

 Automated erythrocyte mean corpuscular hemoglobin concentration measurement (
mass/volume)            31 g/dL            32-36        

 

 Automated erythrocyte distribution width ratio            15.6 %            
10.0-14.5        

 

 Automated blood platelet count (count/volume)            277 10*3/uL          
  130-400        

 

 Automated blood platelet mean volume measurement            10.2 [foz_us]     
       7.4-10.4        

 

 Automated blood neutrophils/100 leukocytes            83 %            42-75   
     

 

 Automated blood lymphocytes/100 leukocytes            9 %            12-44    
    

 

 Blood monocytes/100 leukocytes            8 %            0-12        

 

 Automated blood eosinophils/100 leukocytes            1 %            0-10     
   

 

 Automated blood basophils/100 leukocytes            0 %            0-10        

 

 Blood neutrophils automated count (number/volume)            9.5 10*3         
   1.8-7.8        

 

 Blood lymphocytes automated count (number/volume)            1.0 10*3         
   1.0-4.0        

 

 Blood monocytes automated count (number/volume)            0.9 10*3            
0.0-1.0        

 

 Automated eosinophil count            0.1 10*3/uL            0.0-0.3        

 

 Automated blood basophil count (count/volume)            0.0 10*3/uL          
  0.0-0.1        









 Blood lactic acid measurement (moles/volume) - 17 03:44         









 Blood lactic acid measurement (moles/volume)            2.48 mmol/L            
0.50-2.00        









 Comprehensive metabolic panel - 17 03:44         









 Serum or plasma sodium measurement (moles/volume)            137 mmol/L       
     135-145        

 

 Serum or plasma potassium measurement (moles/volume)            5.2 mmol/L    
        3.6-5.0        

 

 Serum or plasma chloride measurement (moles/volume)            109 mmol/L     
               

 

 Carbon dioxide            12 mmol/L            21-32        

 

 Serum or plasma anion gap determination (moles/volume)            16 mmol/L   
         5-14        

 

 Serum or plasma urea nitrogen measurement (mass/volume)            40 mg/dL   
         7-18        

 

 Serum or plasma creatinine measurement (mass/volume)            1.98 mg/dL    
        0.60-1.30        

 

 Serum or plasma urea nitrogen/creatinine mass ratio            20             
NRG        

 

 Serum or plasma creatinine measurement with calculation of estimated 
glomerular filtration rate            24             NRG        

 

 Serum or plasma glucose measurement (mass/volume)            162 mg/dL        
            

 

 Serum or plasma calcium measurement (mass/volume)            9.4 mg/dL        
    8.5-10.1        

 

 Serum or plasma total bilirubin measurement (mass/volume)            0.1 mg/dL
            0.1-1.0        

 

 Serum or plasma alkaline phosphatase measurement (enzymatic activity/volume)  
          48 U/L                    

 

 Serum or plasma aspartate aminotransferase measurement (enzymatic activity/
volume)            17 U/L            5-34        

 

 Serum or plasma alanine aminotransferase measurement (enzymatic activity/volume
)            14 U/L            0-55        

 

 Serum or plasma protein measurement (mass/volume)            8.1 g/dL         
   6.4-8.2        

 

 Serum or plasma albumin measurement (mass/volume)            4.2 g/dL         
   3.2-4.5        









 Magnesium - 17 03:44         









 Magnesium            2.2 mg/dL            1.8-2.4        









 Bacterial blood culture - 17 03:44         









 Bacterial blood culture            NG             NRG        









 Complete urinalysis with reflex to culture - 17 04:26         









 Urine color determination            YELLOW             NRG        

 

 Urine clarity determination            CLEAR             NRG        

 

 Urine pH measurement by test strip            5             5-9        

 

 Specific gravity of urine by test strip            1.020             1.016-
1.022        

 

 Urine protein assay by test strip, semi-quantitative            2+             
NEGATIVE        

 

 Urine glucose detection by automated test strip            NEGATIVE           
  NEGATIVE        

 

 Erythrocytes detection in urine sediment by light microscopy            1+    
         NEGATIVE        

 

 Urine ketones detection by automated test strip            NEGATIVE           
  NEGATIVE        

 

 Urine nitrite detection by test strip            NEGATIVE             NEGATIVE
        

 

 Urine total bilirubin detection by test strip            NEGATIVE             
NEGATIVE        

 

 Urine urobilinogen measurement by automated test strip (mass/volume)          
  NORMAL             NORMAL        

 

 Urine leukocyte esterase detection by dipstick            1+             
NEGATIVE        

 

 Automated urine sediment erythrocyte count by microscopy (number/high power 
field)             [HPF]            NRG        

 

 Automated urine sediment leukocyte count by microscopy (number/high power field
)            RARE             NRG        

 

 Bacteria detection in urine sediment by light microscopy            TRACE     
        NRG        

 

 Squamous epithelial cells detection in urine sediment by light microscopy     
       0-2             NRG        

 

 Crystals detection in urine sediment by light microscopy            NONE      
       NRG        

 

 Casts detection in urine sediment by light microscopy            PRESENT      
       NRG        

 

 Mucus detection in urine sediment by light microscopy            MODERATE     
        NRG        

 

 Complete urinalysis with reflex to culture            NO             NRG      
  

 

 Hyaline casts detection in urine sediment by light microscopy            5-10 
            NRG        









 Serum or plasma lactate measurement (moles/volume) - 17 06:27         









 Serum or plasma lactate measurement (moles/volume)            2.28 mmol/L     
       0.50-2.00        









 Capillary blood glucose measurement by glucometer (mass/volume) - 17 21:
32         









 Capillary blood glucose measurement by glucometer (mass/volume)            126 
mg/dL                    









 Capillary blood glucose measurement by glucometer (mass/volume) - 17 05:
19         









 Capillary blood glucose measurement by glucometer (mass/volume)            130 
mg/dL                    









 Complete blood count (CBC) with automated white blood cell (WBC) differential 
- 17 07:31         









 Blood leukocytes automated count (number/volume)            8.7 10*3/uL       
     4.3-11.0        

 

 Blood erythrocytes automated count (number/volume)            2.91 10*6/uL    
        4.35-5.85        

 

 Venous blood hemoglobin measurement (mass/volume)            8.5 g/dL         
   11.5-16.0        

 

 Blood hematocrit (volume fraction)            27 %            35-52        

 

 Automated erythrocyte mean corpuscular volume            93 [foz_us]          
  80-99        

 

 Automated erythrocyte mean corpuscular hemoglobin (mass per erythrocyte)      
      29 pg            25-34        

 

 Automated erythrocyte mean corpuscular hemoglobin concentration measurement (
mass/volume)            32 g/dL            32-36        

 

 Automated erythrocyte distribution width ratio            15.7 %            
10.0-14.5        

 

 Automated blood platelet count (count/volume)            223 10*3/uL          
  130-400        

 

 Automated blood platelet mean volume measurement            9.7 [foz_us]      
      7.4-10.4        

 

 Automated blood neutrophils/100 leukocytes            69 %            42-75   
     

 

 Automated blood lymphocytes/100 leukocytes            23 %            12-44   
     

 

 Blood monocytes/100 leukocytes            7 %            0-12        

 

 Automated blood eosinophils/100 leukocytes            1 %            0-10     
   

 

 Automated blood basophils/100 leukocytes            0 %            0-10        

 

 Blood neutrophils automated count (number/volume)            6.0 10*3         
   1.8-7.8        

 

 Blood lymphocytes automated count (number/volume)            2.0 10*3         
   1.0-4.0        

 

 Blood monocytes automated count (number/volume)            0.6 10*3            
0.0-1.0        

 

 Automated eosinophil count            0.1 10*3/uL            0.0-0.3        

 

 Automated blood basophil count (count/volume)            0.0 10*3/uL          
  0.0-0.1        









 Whole blood basic metabolic panel - 17 07:31         









 Serum or plasma sodium measurement (moles/volume)            141 mmol/L       
     135-145        

 

 Serum or plasma potassium measurement (moles/volume)            4.2 mmol/L    
        3.6-5.0        

 

 Serum or plasma chloride measurement (moles/volume)            116 mmol/L     
               

 

 Carbon dioxide            18 mmol/L            21-32        

 

 Serum or plasma anion gap determination (moles/volume)            7 mmol/L    
        5-14        

 

 Serum or plasma urea nitrogen measurement (mass/volume)            26 mg/dL   
         7-18        

 

 Serum or plasma creatinine measurement (mass/volume)            1.34 mg/dL    
        0.60-1.30        

 

 Serum or plasma urea nitrogen/creatinine mass ratio            19             
NRG        

 

 Serum or plasma creatinine measurement with calculation of estimated 
glomerular filtration rate            38             NRG        

 

 Serum or plasma glucose measurement (mass/volume)            159 mg/dL        
            

 

 Serum or plasma calcium measurement (mass/volume)            7.7 mg/dL        
    8.5-10.1        









 Capillary blood glucose measurement by glucometer (mass/volume) - 17 10:
55         









 Capillary blood glucose measurement by glucometer (mass/volume)            150 
mg/dL                    









 Capillary blood glucose measurement by glucometer (mass/volume) - 17 16:
19         









 Capillary blood glucose measurement by glucometer (mass/volume)            102 
mg/dL                    









 Capillary blood glucose measurement by glucometer (mass/volume) - 17 20:
21         









 Capillary blood glucose measurement by glucometer (mass/volume)            182 
mg/dL                    









 Capillary blood glucose measurement by glucometer (mass/volume) - 17 05:
12         









 Capillary blood glucose measurement by glucometer (mass/volume)            141 
mg/dL                    









 Capillary blood glucose measurement by glucometer (mass/volume) - 17 10:
43         









 Capillary blood glucose measurement by glucometer (mass/volume)            171 
mg/dL                    









 Capillary blood glucose measurement by glucometer (mass/volume) - 17 15:
58         









 Capillary blood glucose measurement by glucometer (mass/volume)            132 
mg/dL                    









 CBC With Differential/Platelet - 17 09:38         









 WBC            8.0 x10E3/uL            3.4-10.8        

 

 RBC            3.31 x10E6/uL            3.77-5.28        

 

 Hemoglobin            9.0 g/dL            11.1-15.9        

 

 Hematocrit            28.5 %            34.0-46.6        

 

 MCV            86 fL            79-97        

 

 MCH            27.2 pg            26.6-33.0        

 

 MCHC            31.6 g/dL            31.5-35.7        

 

 RDW            15.7 %            12.3-15.4        

 

 Platelets            320 x10E3/uL            150-379        

 

 Neutrophils            52 %                     

 

 Lymphs            32 %                     

 

 Monocytes            8 %                     

 

 Eos            7 %                     

 

 Basos            1 %                     

 

 Neutrophils (Absolute)            4.3 x10E3/uL            1.4-7.0        

 

 Lymphs (Absolute)            2.5 x10E3/uL            0.7-3.1        

 

 Monocytes(Absolute)            0.6 x10E3/uL            0.1-0.9        

 

 Eos (Absolute)            0.6 x10E3/uL            0.0-0.4        

 

 Baso (Absolute)            0.0 x10E3/uL            0.0-0.2        

 

 Immature Granulocytes            0 %                     

 

 Immature Grans (Abs)            0.0 x10E3/uL            0.0-0.1        









 ANEMIA PANEL - 18 10:36         









 IRON, TOTAL            109 mcg/dL                    

 

 FERRITIN            13 ng/mL                    

 

 IRON BINDING CAPACITY            457 mcg/dL (calc)            250-450        

 

 % SATURATION            24 % (calc)            11-50        









 CMP - 18 10:36         









 GLUCOSE            137 mg/dL            65-99        

 

 UREA NITROGEN (BUN)            30 mg/dL            7-25        

 

 CREATININE            1.38 mg/dL            0.60-0.88        

 

 eGFR NON-AFR. AMERICAN            35 mL/min/1.73m2            > OR=60        

 

 eGFR             41 mL/min/1.73m2            > OR=60        

 

 BUN/CREATININE RATIO            22 (calc)            6-22        

 

 SODIUM            138 mmol/L            135-146        

 

 POTASSIUM            4.3 mmol/L            3.5-5.3        

 

 CHLORIDE            107 mmol/L                    

 

 CARBON DIOXIDE            18 mmol/L            20-31        

 

 CALCIUM            9.5 mg/dL            8.6-10.4        

 

 PROTEIN, TOTAL            8.0 g/dL            6.1-8.1        

 

 ALBUMIN            4.3 g/dL            3.6-5.1        

 

 GLOBULIN            3.7 g/dL (calc)            1.9-3.7        

 

 ALBUMIN/GLOBULIN RATIO            1.2 (calc)            1.0-2.5        

 

 BILIRUBIN, TOTAL            0.3 mg/dL            0.2-1.2        

 

 ALKALINE PHOSPHATASE            56 U/L                    

 

 AST            14 U/L            10-35        

 

 ALT            10 U/L                    









 CMP - 18 10:41         









 GLUCOSE            143 mg/dL            65-99        

 

 UREA NITROGEN (BUN)            34 mg/dL            7-25        

 

 CREATININE            1.48 mg/dL            0.60-0.88        

 

 eGFR NON-AFR. AMERICAN            32 mL/min/1.73m2            > OR=60        

 

 eGFR             37 mL/min/1.73m2            > OR=60        

 

 BUN/CREATININE RATIO            23 (calc)            6-22        

 

 SODIUM            137 mmol/L            135-146        

 

 POTASSIUM            4.3 mmol/L            3.5-5.3        

 

 CHLORIDE            106 mmol/L                    

 

 CARBON DIOXIDE            21 mmol/L            20-31        

 

 CALCIUM            9.6 mg/dL            8.6-10.4        

 

 PROTEIN, TOTAL            7.8 g/dL            6.1-8.1        

 

 ALBUMIN            4.3 g/dL            3.6-5.1        

 

 GLOBULIN            3.5 g/dL (calc)            1.9-3.7        

 

 ALBUMIN/GLOBULIN RATIO            1.2 (calc)            1.0-2.5        

 

 BILIRUBIN, TOTAL            0.3 mg/dL            0.2-1.2        

 

 ALKALINE PHOSPHATASE            50 U/L                    

 

 AST            12 U/L            10-35        

 

 ALT            8 U/L                    









 CBC - 18 10:41         









 WHITE BLOOD CELL COUNT            6.2 Thousand/uL            3.8-10.8        

 

 RED BLOOD CELL COUNT            3.73 Million/uL            3.80-5.10        

 

 HEMOGLOBIN            11.5 g/dL            11.7-15.5        

 

 HEMATOCRIT            33.9 %            35.0-45.0        

 

 MCV            90.9 fL            80.0-100.0        

 

 MCH            30.8 pg            27.0-33.0        

 

 MCHC            33.9 g/dL            32.0-36.0        

 

 RDW            13.8 %            11.0-15.0        

 

 PLATELET COUNT            249 Thousand/uL            140-400        

 

 MPV            10.7 fL            7.5-12.5        

 

 ABSOLUTE NEUTROPHILS            3633 cells/uL            6303-3219        

 

 ABSOLUTE LYMPHOCYTES            1854 cells/uL            850-3900        

 

 ABSOLUTE MONOCYTES            403 cells/uL            200-950        

 

 ABSOLUTE EOSINOPHILS            260 cells/uL                    

 

 ABSOLUTE BASOPHILS            50 cells/uL            0-200        

 

 NEUTROPHILS            58.6 %            NRG        

 

 LYMPHOCYTES            29.9 %            NRG        

 

 MONOCYTES            6.5 %            NRG        

 

 EOSINOPHILS            4.2 %            NRG        

 

 BASOPHILS            0.8 %            NRG        



                                                              



Encounters

      





 ACCT No.            Visit Date/Time            Discharge            Status    
        Pt. Type            Provider            Facility            Loc./Unit  
          Complaint        

 

 [id number]            2019 08:58:00            2019 11:39:00    
        DIS            Emergency            MIGNON MULTANI, ADITYA RUIZ            [facility] 
[facility]            ER            LT LEG INJURY        

 

 [id number]            2019 06:35:00            2019 08:00:00    
        DIS            Outpatient            ZENA MULTANI, SHIN NICHOLE            [facility] 
[facility]            ER            FELL OUT OF WHEELCHAIR   
     

 

 [id number]            2017 07:00:00            2017 17:15:00    
        DIS            Inpatient            INDIA MULTANI, SARA GRAJEDA            [facility] 
[facility]            4TH            DIARRHEA,VOLUME 
DELPETION        

 

 [id number]            2015 19:37:00            2015 20:35:00    
        DIS            Emergency            FAISAL MILLER            [facility] 
[facility]            ER            COUGH/CONGESTION        

 

 [id number]            2015 17:29:00            2015 19:34:00    
        DIS            Emergency            FAISAL MILLER            [facility] 
[facility]            ER            VOMITING,WEAKNESS        

 

 [id number]            2015 19:37:00                                   
   Document Registration                                                       
     

 

 [id number]            2015 19:37:00                                   
   Document Registration                                                       
     

 

 [id number]            2012 13:03:00                                   
   Document Registration                                                       
     

 

 [id number]            2010 15:21:00                                   
   Document Registration                                                       
     

 

 [id number]            03/10/2010 12:33:00                                   
   Document Registration                                                       
     

 

 [id number]            2017 00:05:00                                   
   Document Registration                                                       
     

 

 [id number]            2018 12:20:00            2018 23:59:59          
  CLS            Outpatient            JUAN CARLOS MULTANI, YULISA                       
  [facility]                     

 

 [id number]            2018 09:00:00                                      
Document Registration                                                          
  

 

 [id number]            2018 10:00:00                                      
Document Registration                                                          
  

 

 [id number]            07/15/2017 08:06:00                                   
   Document Registration

## 2019-02-08 NOTE — XMS REPORT
[facility]

 Created on: 2018



Sandrita Stoddard

External Reference #: [id number]

: 1934

Sex: Female



Demographics







 Address  [address] [place]  [address]

 

 Preferred Language  Unknown

 

 Marital Status  Unknown

 

 Hindu Affiliation  Unknown

 

 Race  Unknown

 

 Ethnic Group  Unknown





Author







 Author  YULISA RANGEL

 

 Organization  [facility]

 

 Address  [address] [place]  [address]



 

 Phone  [phone]







Care Team Providers







 Care Team Member Name  Role  Phone

 

 YULISA RANGEL  Unavailable  [phone]







PROBLEMS







 Type  Condition  ICD9-CM Code  NMX97-TG Code  Onset Dates  Condition Status  
SNOMED Code

 

 Problem  Essential hypertension     I10     Active  68042575

 

 Problem  Type 2 diabetes mellitus without complication, without long-term 
current use of insulin     E11.9     Active  321024184

 

 Problem  Anxiety     F41.9     Active  28142605

 

 Problem  Lumbago with sciatica, left side     M54.42     Active  984436279

 

 Problem  Arthritis     M19.90     Active  3692250

 

 Problem  Iron deficiency anemia, unspecified iron deficiency anemia type     
D50.9     Active  58899300

 

 Problem  Venous insufficiency     I87.2     Active  87070685

 

 Problem  Gastroesophageal reflux disease, esophagitis presence not specified  
   K21.9     Active  083751442

 

 Problem  Seasonal allergic rhinitis due to pollen     J30.1     Active  
03846613

 

 Problem  Anemia of chronic disease     D63.8     Active  006709496

 

 Problem  Chronic kidney disease, stage 3 (moderate)     N18.3     Active  
159642266







ALLERGIES

No Information



ENCOUNTERS







 Encounter  Location  Date  Diagnosis

 

 [place] [address] [place] [address] [phone]
[phone]  15 May, 2018   

 

 [facility]  [address] [place] [id number] [place] [address] [phone]
[phone]  15 May, 2018   

 

 [facility]  [address] [place] [address] [phone]
[phone]  15 May, 2018  Medicare annual wellness visit, initial Z00.00 ; Type 2 
diabetes mellitus without complication, without long-term current use of 
insulin E11.9 ; Chronic kidney disease, stage 3 (moderate) N18.3 ; Essential 
hypertension I10 ; Gastroesophageal reflux disease, esophagitis presence not 
specified K21.9 ; Anxiety F41.9 ; Arthritis M19.90 and Encounter for 
immunization Z23

 

 [place] [address] [place] [address] [phone]-
[phone]  03 May, 2018  Essential hypertension I10

 

 [facility]  [address] [phone]
[phone]    Arthritis M19.90

 

 [facility]  [address] [place] [id number] [place] [address] [phone]-
[phone]     

 

 [facility]  [address] [phone]
[phone]     

 

 [facility]  [address] [place] [id number] [place] [address] [phone]-
[phone]    Essential hypertension I10

 

 [facility] [place] [facility]  [address] [id number] [place] [address] [phone]
[phone]  27 Mar, 2018  Dysuria R30.0 and Vaginal candida B37.3

 

 [place] [address] [phone]-
[phone]  08 Mar, 2018  Arthritis M19.90

 

 [facility]  [address] [place] [id number] [place] [address] [phone]-
[phone]     

 

 [place] [address] [place] [id number] [place] [address] 37610-
[phone]    Type 2 diabetes mellitus without complication, without long-
term current use of insulin E11.9 ; Lumbago with sciatica, left side M54.42 ; 
Arthritis M19.90 ; Iron deficiency anemia, unspecified iron deficiency anemia 
type D50.9 and BMI 40.0-44.9, adult Z68.41

 

 [facility]  [address] [place] [id number] [place] [address] [phone]
[phone]     

 

 [place] [address] [place] [id number] [place] [address] [phone]-
[phone]     

 

 [place] [address] [place] [id number] [place] [address] [phone]-
[phone]    Arthritis M19.90 and Anxiety F41.9

 

 [place] [address] [place] [id number] [place] [address] [phone]-
[phone]     

 

 [facility]  [id number] [place] [address] [phone]
[phone]  18 Dec, 2017  Anxiety F41.9

 

 [facility]  [address] [place] [id number] [place] [address] [phone]-
[phone]     

 

 [facility]  [address] [phone]
[phone]     

 

 [facility]  [address] [place] [id number] [place] [address] [phone]
[phone]    Vaginal yeast infection B37.3

 

 [facility]  [address] [place] [id number] [place] [address] [phone]-
[phone]    Anxiety F41.9

 

 [facility]  [address] [place] [id number] [place] [address] [phone]-
[phone]    Type 2 diabetes mellitus without complication, without long-
term current use of insulin E11.9

 

 [facility]  [address] [place] [id number] [place] [address] [phone]-
[phone]     

 

 [facility]  [address] [place] [id number] [place] [address] [phone]
[phone]     

 

 [facility]  [address] [place] [id number] [place] [address] [phone]-
2905  24 Oct, 2017  Arthritis M19.90

 

 [facility]  [address] [place] [id number] [place] [address] [phone]
[phone]  16 Oct, 2017   

 

 [facility]  [address] [place] [id number] [place] [address] [phone]
[phone]  04 Oct, 2017   

 

 [facility]  [address] [place] [address] [id number] [place] [address] [phone]
[phone]  05 Sep, 2017   

 

 [facility]  [address] [place] [address] [phone]-
[phone]  25 Aug, 2017  Venous insufficiency I87.2 and Venous stasis dermatitis of 
right lower extremity I87.2

 

 [place] [address] [place] [address] [phone]-
[phone]  21 Aug, 2017  Essential hypertension I10

 

 [place] [address] [place] [id number] [place] [address] [phone]-
[phone]     

 

 [place] [address] [place] [id number] [place] [address] [phone]-
[phone]    Type 2 diabetes mellitus without complication, without long-
term current use of insulin E11.9 and Essential hypertension I10

 

 [place] [address] [place] [id number] [place] [address] [phone]-
[phone]    Essential hypertension I10 and Type 2 diabetes mellitus 
without complication, without long-term current use of insulin E11.9

 

 [place] [address] [place] [id number] [place] [address] [phone]-
[phone]    Chronic kidney disease, stage 3 (moderate) N18.3 ; Anemia 
of chronic disease D63.8 and Type 2 diabetes mellitus without complication, 
without long-term current use of insulin E11.9

 

 [place] [address] [place] [id number] [place] [address] 57214-
[phone]    Type 2 diabetes mellitus without complication, without long-
term current use of insulin E11.9 ; Iron deficiency anemia secondary to 
inadequate dietary iron intake D50.8 ; Arthritis M19.90 and Lumbago with 
sciatica, left side M54.42

 

 [place] [id number] [place] [address] 06663-
0676    Arthritis M19.90 and Anxiety F41.9

 

 [place] [address] [place] [address] 67233-
0748    Type 2 diabetes mellitus without complication, without long-
term current use of insulin E11.9 and Essential hypertension I10

 

 [place] [address] [place] [address] [phone]-
2265  22 May, 2017  Anxiety F41.9

 

 [place] [address] [place] [id number] [place] [address] [phone]-
[phone]  18 May, 2017  Monilial rash B37.2

 

 [facility]  [address] [place] [id number] [place] [address] [phone]-
[phone]  16 May, 2017   

 

 [facility]  [address] [place] [id number] [place] [address] [phone]-
[phone]  15 May, 2017   

 

 [facility]  [address] [place] [id number] [place] [address] [phone]-
[phone]  11 May, 2017   

 

 [facility]  [address] [place] [id number] [place] [address] [phone]-
[phone]  11 May, 2017  Gastroesophageal reflux disease, esophagitis presence not 
specified K21.9

 

 [place] [address] [place] [id number] [place] [address] 55391-
4839  09 May, 2017  Arthritis M19.90

 

 [place] [address] [place] [id number] [place] [address] 44425-
0603  09 May, 2017  Anxiety F41.9 ; Arthritis M19.90 and Essential hypertension 
I10

 

 [place] [address] [place] [id number] [place] [address] 37696-
9105  05 May, 2017  Essential hypertension I10 and Anxiety F41.9

 

 [place] [address] [place] [id number] [place] [address] 55108-
[phone]     

 

 [place] [address] [place] [id number] [place] [address] 68228-
6706    Arthritis M19.90 and Anxiety F41.9

 

 [place] [address] [place] [id number] [place] [address] 68008-
0655    Type 2 diabetes mellitus without complication, without long-
term current use of insulin E11.9 ; Cellulitis of right lower extremity L03.115 
and Arthritis M19.90

 

 [place] [address] [place] [id number] [place] [address] 37145-
[phone]  28 Mar, 2017   

 

 [place] [address] [place] [id number] [place] [address] [phone]-
[phone]  20 Mar, 2017  Type 2 diabetes mellitus without complication, without long-
term current use of insulin E11.9 ; Bronchitis J40 and Arthritis M19.90

 

 [place] [address] [place] [id number] [place] [address] [phone]-
[phone]  16 Mar, 2017   

 

 [place] [address] [place] [id number] [place] [address] [phone]-
[phone]    Anxiety F41.9

 

 [facility] WALK IN [place] [address] [phone]
[phone]    Dysuria R30.0 ; Acute cystitis with hematuria N30.01 and 
Vaginal yeast infection B37.3

 

 [place] [address] [place] [id number] [place] [address] 06647-
8530    Arthritis M19.90

 

 [facility] WALK IN [place] [address] [place] [id number] [place] [address] [phone]
[phone]    Strep pharyngitis J02.0 and Sore throat J02.9

 

 [place] [address] [phone]-
[phone]     

 

 [place] [address] [place] [id number] [place] [address] [phone]-
[phone]    Type 2 diabetes mellitus without complication, without long-
term current use of insulin E11.9

 

 [place] [address] [place] [id number] [place] [address] 54151-
[phone]  14 Dec, 2016   

 

 [place] [address] [place] [id number] [place] [address] [phone]-
[phone]  13 Dec, 2016  Seasonal allergic rhinitis due to pollen J30.1

 

 [facility] WALK IN [place] [address] [place] [id number] [place] [address] [phone]
[phone]  09 Dec, 2016  Impacted cerumen of right ear H61.21 and Seasonal allergic 
rhinitis due to pollen J30.1

 

 [place] [address] [place] [id number] [place] [address] [phone]-
[phone]  09 Dec, 2016   

 

 [place] [address] [phone]-
[phone]  05 Dec, 2016   

 

 [facility]  [address] [address]-
7030    Type 2 diabetes mellitus without complication, without long-
term current use of insulin E11.9 ; Anxiety F41.9 ; Essential hypertension I10 
and Encounter for immunization Z23

 

 [facility]  [address] [place] [id number] [place] [address] [phone]
[phone]    Essential hypertension I10

 

 [facility]  [address] [place] [id number] [place] [address] [phone]-
[phone]     

 

 [facility]  [address] [place] [id number] [place] [address] [phone]-
[phone]     

 

 [facility]  [address] [place] [id number] [place] [address] [phone]-
[phone]    Essential hypertension I10

 

 [facility]  [address] [place] [id number] [place] [address] [phone]-
[phone]     

 

 [facility]  [address] [place] [id number] [place] [address] [phone]-
[phone]  23 Sep, 2016   

 

 [facility]  [address] [place] [id number] [place] [address] [phone]-
[phone]  01 Sep, 2016   

 

 [facility]  [address] [place] [id number] [place] [address] [phone]-
[phone]  30 Aug, 2016   

 

 [facility]  [address] [place] [address] [phone]-
[phone]  24 Aug, 2016   

 

 [facility]  [address] [place] [id number] [place] [address] [phone]-
[phone]  22 Aug, 2016   

 

 [facility]  [address] [place] [id number] [place] [address] 34610-
[phone]  22 Aug, 2016  Type 2 diabetes mellitus without complication, without long-
term current use of insulin E11.9 ; Essential hypertension I10 ; Acute non-
recurrent maxillary sinusitis J01.00 ; Arthritis M19.90 ; Lumbago with sciatica
, left side M54.42 ; Other chronic pain G89.29 and Anxiety F41.9







IMMUNIZATIONS

No Known Immunizations



SOCIAL HISTORY

Never Assessed



REASON FOR VISIT

Refill request



PLAN OF CARE





VITAL SIGNS





MEDICATIONS

Unknown Medications



RESULTS

No Results



PROCEDURES

No Known procedures



INSTRUCTIONS





MEDICATIONS ADMINISTERED

No Known Medications



MEDICAL (GENERAL) HISTORY







 Type  Description  Date

 

 Medical History  type II diabetes   

 

 Medical History  hypertension   

 

 Medical History  Arthritis   

 

 Medical History  skin cancer-scalp   

 

 Surgical History  hysterectomy   

 

 Surgical History  skin cancer removal-scalp   

 

 Hospitalization History  Surgery(s) only   

 

 Hospitalization History  dehydration  2017

 

 Hospitalization History  bronchitis  2107

## 2019-02-08 NOTE — XMS REPORT
[facility]

 Created on: 2018



Richi Sandrita

External Reference #: [id number]

: 1934

Sex: Female



Demographics







 Address  [address] [place]  [address]

 

 Preferred Language  Unknown

 

 Marital Status  Unknown

 

 Holiness Affiliation  Unknown

 

 Race  Unknown

 

 Ethnic Group  Unknown





Author







 Author  YULISA RANGEL

 

 [facility]

 

 Address  [address] [place]  [address]



 

 Phone  [phone]







Care Team Providers







 Care Team Member Name  Role  Phone

 

 YULISA RANGEL  Unavailable  [phone]







PROBLEMS







 Type  Condition  ICD9-CM Code  XVS48-BV Code  Onset Dates  Condition Status  
SNOMED Code

 

 Problem  Essential hypertension     I10     Active  80428450

 

 Problem  Type 2 diabetes mellitus without complication, without long-term 
current use of insulin     E11.9     Active  088174105

 

 Problem  Anxiety     F41.9     Active  21820979

 

 Problem  Lumbago with sciatica, left side     M54.42     Active  957235397

 

 Problem  Arthritis     M19.90     Active  1756617

 

 Problem  Iron deficiency anemia, unspecified iron deficiency anemia type     
D50.9     Active  11848517

 

 Problem  Venous insufficiency     I87.2     Active  75514485

 

 Problem  Gastroesophageal reflux disease, esophagitis presence not specified  
   K21.9     Active  721567202

 

 Problem  Seasonal allergic rhinitis due to pollen     J30.1     Active  
38202995

 

 Problem  Anemia of chronic disease     D63.8     Active  602129486

 

 Problem  Chronic kidney disease, stage 3 (moderate)     N18.3     Active  
[id number]







ALLERGIES

No Information



ENCOUNTERS







 Encounter  Location  Date  Diagnosis

 

 [place] [id number] [place] [address] 19807-
[phone]    Essential hypertension I10

 

 [place] [id number] [place] [address] 11978-
6933    Type 2 diabetes mellitus without complication, without long-
term current use of insulin E11.9 ; Essential hypertension I10 and Anemia of 
chronic disease D63.8

 

 [place] [id number] [place] [address] 86513-
3029    Arthritis M19.90 and Essential hypertension I10

 

 [place] [id number] [place] [address] 27548-
8831  15 May, 2018   

 

 [place] [id number] [place] [address] 91247-
8081  15 May, 2018   

 

 [facility]  [address] [address]-
6341  15 May, 2018  Medicare annual wellness visit, initial Z00.00 ; Type 2 
diabetes mellitus without complication, without long-term current use of 
insulin E11.9 ; Chronic kidney disease, stage 3 (moderate) N18.3 ; Essential 
hypertension I10 ; Gastroesophageal reflux disease, esophagitis presence not 
specified K21.9 ; Anxiety F41.9 ; Arthritis M19.90 and Encounter for 
immunization Z23

 

 [place] [id number] [place] [address] 14475-
2778  03 May, 2018  Essential hypertension I10

 

 [place] [address] [address]-
7697    Arthritis M19.90

 

 [place] [address] 8[address]-
5313     

 

 [facility]  [id number] [place] [address] 98583-
9797     

 

 [place] [id number] [place] [address] 35757-
3512    Essential hypertension I10

 

 [facility]  [id number] [place] [address] 88136
-[phone]  27 Mar, 2018  Dysuria R30.0 and Vaginal candida B37.3

 

 [place] [id number] [place] [address] 69626-
8944  08 Mar, 2018  Arthritis M19.90

 

 [place] [id number] [place] [address] 00626-
0519     

 

 [place] [id number] [place] [address] 42677-
4177    Type 2 diabetes mellitus without complication, without long-
term current use of insulin E11.9 ; Lumbago with sciatica, left side M54.42 ; 
Arthritis M19.90 ; Iron deficiency anemia, unspecified iron deficiency anemia 
type D50.9 and BMI 40.0-44.9, adult Z68.41

 

 [facility]  [address] [address]993-
3563     

 

 [facility]  [address] 9[address]44-
0783     

 

 [facility]  [address] 18750-
5691    Arthritis M19.90 and Anxiety F41.9

 

 [facility]  [address] [address]22-
1533     

 

 [place] [address] [address]50-
2042  18 Dec, 2017  Anxiety F41.9

 

 [place] [address] 64894-
2878     

 

 [facility]  [address] 19941-
1154     

 

 [place] [address] 56440-
6127    Vaginal yeast infection B37.3

 

 [place] [id number] [place] [address] 49617-
0537    Anxiety F41.9

 

 [facility]  [address] 15207-
4534    Type 2 diabetes mellitus without complication, without long-
term current use of insulin E11.9

 

 [facility]  [id number] [place] [address] 47617-
0869  08 2017   

 

 [place] [address] 42074-
0744     

 

 [facility]  [id number] [place] [address] 34101-
6218  24 Oct, 2017  Arthritis M19.90

 

 [place] [address] [address]-
6422  16 Oct, 2017   

 

 [place] [id number] [place] [address] 89115-
4985  04 Oct, 2017   

 

 [place] [id number] [place] [address] 03597-
9911  05 Sep, 2017   

 

 [place] [id number] [place] [address] 06905-
9740  25 Aug, 2017  Venous insufficiency I87.2 and Venous stasis dermatitis of 
right lower extremity I87.2

 

 [place] [id number] [place] [address] 07928-
3905  21 Aug, 2017  Essential hypertension I10

 

 [place] [id number] [place] [address] 33847-
1689     

 

 [place] [id number] [place] [address] 89382-
3668    Type 2 diabetes mellitus without complication, without long-
term current use of insulin E11.9 and Essential hypertension I10

 

 [place] [id number] [place] [address] 34125-
9113    Essential hypertension I10 and Type 2 diabetes mellitus 
without complication, without long-term current use of insulin E11.9

 

 [place] [id number] [place] [address] 89100-
3134    Chronic kidney disease, stage 3 (moderate) N18.3 ; Anemia 
of chronic disease D63.8 and Type 2 diabetes mellitus without complication, 
without long-term current use of insulin E11.9

 

 [place] [address] [place] [address] 68156-
5056  14 2017  Type 2 diabetes mellitus without complication, without long-
term current use of insulin E11.9 ; Iron deficiency anemia secondary to 
inadequate dietary iron intake D50.8 ; Arthritis M19.90 and Lumbago with 
sciatica, left side M54.42

 

 [place] [id number] [place] [address] 32052-
0096    Arthritis M19.90 and Anxiety F41.9

 

 [facility]  [id number] [place] [address] 95072-
4649    Type 2 diabetes mellitus without complication, without long-
term current use of insulin E11.9 and Essential hypertension I10

 

 [facility]  [id number] [place] [address] 20466-
9730  22 May, 2017  Anxiety F41.9

 

 [facility]  [address] 73902-
9206  18 May, 2017  Monilial rash B37.2

 

 [facility]  [address] 95789-
9638  16 May, 2017   

 

 [facility]  [id number] [place] [address] 88417-
6572  15 May, 2017   

 

 [facility]  [address] 11607-
0164  11 May, 2017   

 

 [facility]  [id number] [place] [address] 06410-
6553  11 May, 2017  Gastroesophageal reflux disease, esophagitis presence not 
specified K21.9

 

 [facility]  [id number] [place] [address] 81886-
5026  09 May, 2017  Arthritis M19.90

 

 [facility]  [id number] [place] [address] 95971-
0670  09 May, 2017  Anxiety F41.9 ; Arthritis M19.90 and Essential hypertension 
I10

 

 [facility]  [id number] [place] [address] 68649-
2923  05 May, 2017  Essential hypertension I10 and Anxiety F41.9

 

 [facility]  [address] 90838-
9872     

 

 [facility]  [id number] [place] [address] 93627-
2769    Arthritis M19.90 and Anxiety F41.9

 

 [place] [address] [place] [address] 25416-
8856  11 2017  Type 2 diabetes mellitus without complication, without long-
term current use of insulin E11.9 ; Cellulitis of right lower extremity L03.115 
and Arthritis M19.90

 

 [place] [id number] [place] [address] 91356-
6267  28 Mar, 2017   

 

 [place] [address] [address]676-
2873  20 Mar, 2017  Type 2 diabetes mellitus without complication, without long-
term current use of insulin E11.9 ; Bronchitis J40 and Arthritis M19.90

 

 [place] [address] 39838-
9187  16 Mar, 2017   

 

 [place] [id number] [place] [address] 73885-
1129  24 2017  Anxiety F41.9

 

 [facility] [place] [id number] [place] [address] 98366
-[phone]    Dysuria R30.0 ; Acute cystitis with hematuria N30.01 and 
Vaginal yeast infection B37.3

 

 [place] [id number] [place] [address] 02015-
0635    Arthritis M19.90

 

 [facility] [place] [id number] [place] [address] 86901
-[phone]    Strep pharyngitis J02.0 and Sore throat J02.9

 

 [place] [id number] [place] [address] 20189-
9691     

 

 [place] [id number] [place] [address] 30282-
9551    Type 2 diabetes mellitus without complication, without long-
term current use of insulin E11.9

 

 [place] [id number] [place] [address] 98190-
1058  14 Dec, 2016   

 

 [place] [id number] [place] [address] 18583-
2594  13 Dec, 2016  Seasonal allergic rhinitis due to pollen J30.1

 

 [facility]  [address] 90297
-[phone]  09 Dec, 2016  Impacted cerumen of right ear H61.21 and Seasonal allergic 
rhinitis due to pollen J30.1

 

 [facility]  [address] 20561-
3210  09 Dec, 2016   

 

 [facility]  [address] 34822-
7139  05 Dec, 2016   

 

 [facility]  [address] 84463-
7849    Type 2 diabetes mellitus without complication, without long-
term current use of insulin E11.9 ; Anxiety F41.9 ; Essential hypertension I10 
and Encounter for immunization Z23

 

 [facility]  [address] 70485-
0617    Essential hypertension I10

 

 [facility]  [address] 90776-
9684     

 

 [facility]  [address] 47202-
3566     

 

 [facility]  [id number] [place] [address] 36112-
5085    Essential hypertension I10

 

 [facility]  [id number] [place] [address] 46276-
2633     

 

 [facility]  [id number] [place] [address] 85335-
5389  23 Sep, 2016   

 

 [facility]  [address] 35485-
9357  01 Sep, 2016   

 

 [facility]  [id number] [place] [address] 62451-
0093  30 Aug, 2016   

 

 [facility]  [address] 62981-
3423  24 Aug, 2016   

 

 [facility]  [address] [facility] [place] [id number] [place] [address]-
8930  22 Aug, 2016   

 

 [facility]  [address] [facility] [id number] [address]-
6191  22 Aug, 2016  Type 2 diabetes mellitus without complication, without long-
term current use of insulin E11.9 ; Essential hypertension I10 ; Acute non-
recurrent maxillary sinusitis J01.00 ; Arthritis M19.90 ; Lumbago with sciatica
, left side M54.42 ; Other chronic pain G89.29 and Anxiety F41.9







IMMUNIZATIONS

No Known Immunizations



SOCIAL HISTORY

Never Assessed



REASON FOR VISIT

Xanax



PLAN OF CARE





VITAL SIGNS





MEDICATIONS







 Medication  Instructions  Dosage  Frequency  Start Date  End Date  Duration  
Status

 

 Alprazolam 0.5 MG  Orally Three times a day  1 tablet  8h        30 days  
Active







RESULTS

No Results



PROCEDURES

No Known procedures



INSTRUCTIONS





MEDICATIONS ADMINISTERED

No Known Medications



MEDICAL (GENERAL) HISTORY







 Type  Description  Date

 

 Medical History  type II diabetes   

 

 Medical History  hypertension   

 

 Medical History  Arthritis   

 

 Medical History  skin cancer-scalp   

 

 Surgical History  hysterectomy   

 

 Surgical History  skin cancer removal-scalp   

 

 Hospitalization History  Surgery(s) only   

 

 Hospitalization History  dehydration  2017

 

 Hospitalization History  bronchitis  2107

## 2019-02-08 NOTE — XMS REPORT
[facility]

 Created on: 2017



StoddardVladimirna

External Reference #: [id number]

: 1934

Sex: Female



Demographics







 Address  [address] [place]  [address]

 

 Preferred Language  Unknown

 

 Marital Status  Unknown

 

 Taoist Affiliation  Unknown

 

 Race  Unknown

 

 Ethnic Group  Unknown





Author







 Author  YULISA RANGEL

 

 [facility]

 

 Address  [address] [place]  [address]



 

 Phone  [phone]







Care Team Providers







 Care Team Member Name  Role  Phone

 

 YULISA RANGEL  Unavailable  [phone]







PROBLEMS







 Type  Condition  ICD9-CM Code  TVU56-MS Code  Onset Dates  Condition Status  
SNOMED Code

 

 Assessment  Encounter for immunization     Z23    Active  632257636

 

 Problem  Type 2 diabetes mellitus without complication, without long-term 
current use of insulin     E11.9     Active  061990428

 

 Problem  Essential hypertension     I10     Active  29400768

 

 Problem  Anxiety     F41.9     Active  55583429

 

 Assessment  Type 2 diabetes mellitus without complication, without long-term 
current use of insulin     E11.9    Active  208001271

 

 Problem  Arthritis     M19.90     Active  3150735

 

 Problem  Lumbago with sciatica, left side     M54.42     Active  064321344







ALLERGIES







 Substance  Reaction  Event Type  Date  Status

 

 N.K.D.A.  Unknown  Non Drug Allergy    Unknown







SOCIAL HISTORY

No smoking Hx information available



PLAN OF CARE





VITAL SIGNS







 Height  56 in  2016

 

 Weight  177.3 lbs  2016

 

 Heart Rate  58 bpm  2016

 

 Respiratory Rate  18   2016

 

 BMI  39.75 kg/m2  2016

 

 Blood pressure systolic  114 mmHg  2016

 

 Blood pressure diastolic  58 mmHg  2016







MEDICATIONS







 Medication  Instructions  Dosage  Frequency  Start Date  End Date  Duration  
Status

 

 Timolol 0.5 %  Ophthalmic Twice a day  1 drop into affected eye  12h           
Active

 

 Aspir-81 81 MG  Orally Once a day  1 tablet  24h           Active

 

 Alprazolam 0.5 MG  Orally Three times a day  1 tablet  8h           Active

 

 ProAir  (90 Base) MCG/ACT  Inhalation every 4 hrs  2 puffs as needed  
4h           Active

 

 Tramadol-Acetaminophen 37.5-325 MG  Orally every 4 hrs  2 tablets as needed  
4h           Active

 

 Hydrochlorothiazide 25 MG  Orally Once a day  1 tablet  24h           Active

 

 Acetaminophen 500 MG  Orally every 6 hrs  2 capsules as needed  6h           
Active

 

 Lisinopril 5 mg  Orally Once a day  1 tablet  24h           Active

 

 Xanax 0.5     TAKE ONE TABLET BY MOUTH THREE TIMES A DAY AS NEEDED FOR ANXIETY
           30  Active

 

 Glimepiride 4 MG  Orally Once a day  1 tablet with breakfast or the first main 
meal of the day  24h           Active

 

 Metoprolol Tartrate 25 MG  Orally Twice a day  1 tablet with food  12h        
   Active

 

 Metformin HCl 500 MG  Orally Twice a day  1 tablet with meals  12h           
Active







RESULTS







 Name  Result  Date  Reference Range

 

 A1C (IN HOUSE)     2016   

 

 A1C IN HOUSE  6.7     4.3 - 5.6 %

 

 Previous A1c         

 

 Lot   [id number]      

 

 Exp date  2018      







PROCEDURES







 Procedure  Date Ordered  Related Diagnosis  Body Site

 

 GLYCATED HEMOGLOBIN TEST  2016      

 

 [facility] VISIT ESTABLISHED PATIENT  2016      

 

 PCV 13  2016      

 

 Office Visit, Est Pt., Level 3  2016      

 

 SINGLE IMMUNIZATION ADMIN  2016      







IMMUNIZATIONS







 Vaccine  Route  Administration Date  Status

 

 PCV 13  IM Intramuscular  2016  Administered

## 2019-02-08 NOTE — XMS REPORT
[facility]

 Created on: 2016



StoddardVladimirna

External Reference #: [id number]

: 1934

Sex: Female



Demographics







 Address  [address] [place]  [address]

 

 Home Phone  [phone]

 

 Preferred Language  Unknown

 

 Marital Status  Unknown

 

 Anglican Affiliation  Unknown

 

 Race   or 

 

 Ethnic Group  Not  or 





Author







 Author  YULISA RANGEL

 

 Organization  [employer]

 

 Address  Unknown

 

 Phone  Unavailable







Care Team Providers







 Care Team Member Name  Role  Phone

 

 YULISA RANGEL  CP  Unavailable



                                                                



Allergies

          No Known Allergies                                                   
                                     



Problems

          





 Problem Type  Condition  Code  Onset Dates  Condition Status

 

 Problem  Essential hypertension  I10     Active

 

 Problem  Arthritis  M19.90     Active

 

 Problem  Type 2 diabetes mellitus without complication, without long-term 
current use of insulin  E11.9     Active

 

 Problem  Lumbago with sciatica, left side  M54.42     Active

 

 Problem  Anxiety  F41.9     Active



                                                                               
                                                 



Medications

          No Known Medications                                                 
                             



Results

          No Known Results                                                     
               



Summary Purpose

          [employer] Submission

## 2019-02-08 NOTE — XMS REPORT
[facility]

 Created on: 2018



Sandrita Stoddard

External Reference #: [id number]

: 1934

Sex: Female



Demographics







 Address  [address] [place]  [address]

 

 Preferred Language  Unknown

 

 Marital Status  Unknown

 

 Mu-ism Affiliation  Unknown

 

 Race  Unknown

 

 Ethnic Group  Unknown





Author







 Author  YULISA RANGEL

 

 [facility]

 

 Address  [address] [place]  [address]



 

 Phone  [phone]







Care Team Providers







 Care Team Member Name  Role  Phone

 

 YULISA RANGEL  Unavailable  [phone]







PROBLEMS







 Type  Condition  ICD9-CM Code  SPZ43-FN Code  Onset Dates  Condition Status  
SNOMED Code

 

 Problem  Essential hypertension     I10     Active  99008194

 

 Problem  Type 2 diabetes mellitus without complication, without long-term 
current use of insulin     E11.9     Active  389415527

 

 Problem  Anxiety     F41.9     Active  51836233

 

 Problem  Lumbago with sciatica, left side     M54.42     Active  163360677

 

 Problem  Arthritis     M19.90     Active  3071302

 

 Problem  Iron deficiency anemia, unspecified iron deficiency anemia type     
D50.9     Active  10030365

 

 Problem  Venous insufficiency     I87.2     Active  25645808

 

 Problem  Gastroesophageal reflux disease, esophagitis presence not specified  
   K21.9     Active  351165342

 

 Problem  Seasonal allergic rhinitis due to pollen     J30.1     Active  
45597584

 

 Problem  Anemia of chronic disease     D63.8     Active  348165855

 

 Problem  Chronic kidney disease, stage 3 (moderate)     N18.3     Active  
589459533







ALLERGIES

No Information



ENCOUNTERS







 Encounter  Location  Date  Diagnosis

 

 [place] [address] [place] [address]-
0935  06 Sep, 2018  Essential hypertension I10 and Arthritis M19.90

 

 [place] [address] [address]-
7573  28 Aug, 2018   

 

 [place] [address] [place] [id number] [place] [address]53-
[phone]  16 Aug, 2018   

 

 [place] [address] [place] [id number] [place] [address] 40893-
7907  06 Aug, 2018   

 

 [place] [address] [place] [address]00-
1650    Essential hypertension I10

 

 [place] [address] [place] [id number] [place] [address]65-
8591    Type 2 diabetes mellitus without complication, without long-
term current use of insulin E11.9 ; Essential hypertension I10 and Anemia of 
chronic disease D63.8

 

 [place] [address] [place] [id number] [place] [address] 64542-
7385    Arthritis M19.90 and Essential hypertension I10

 

 [place] [address] [address]-
9344  15 May, 2018   

 

 [place] [address] [address]-
4948  15 May, 2018   

 

 [place] [address] [address]-
0581  15 May, 2018  Medicare annual wellness visit, initial Z00.00 ; Type 2 
diabetes mellitus without complication, without long-term current use of 
insulin E11.9 ; Chronic kidney disease, stage 3 (moderate) N18.3 ; Essential 
hypertension I10 ; Gastroesophageal reflux disease, esophagitis presence not 
specified K21.9 ; Anxiety F41.9 ; Arthritis M19.90 and Encounter for 
immunization Z23

 

 [place] [address] [address]-
6771  03 May, 2018  Essential hypertension I10

 

 [place] [address] [address]-
4238    Arthritis M19.90

 

 [place] [address] [place] [id number] [place] [address] 78904-
4930     

 

 [place] [address] [address]-
0369     

 

 [place] [address] [address]-
6933    Essential hypertension I10

 

 [facility]  [address] [place] [id number] [place] [phone]
[phone]  27 Mar, 2018  Dysuria R30.0 and Vaginal candida B37.3

 

 [place] [address] [place] [id number] [place] [address] 62171-
3020  08 Mar, 2018  Arthritis M19.90

 

 [place] [address] [address]-
2801     

 

 [place] [address] [place] [id number] [place] [address] 52782-
5151    Type 2 diabetes mellitus without complication, without long-
term current use of insulin E11.9 ; Lumbago with sciatica, left side M54.42 ; 
Arthritis M19.90 ; Iron deficiency anemia, unspecified iron deficiency anemia 
type D50.9 and BMI 40.0-44.9, adult Z68.41

 

 [place] [address] [address]-
4574     

 

 [place] [address] [address]-
9304     

 

 [place] [address] [address]-
5594    Arthritis M19.90 and Anxiety F41.9

 

 [place] [address] [address]-
6133     

 

 [place] [address] [place] [id number] [place] [address] 97666-
0077  18 Dec, 2017  Anxiety F41.9

 

 [place] [address] [address]-
9085     

 

 [place] [address] [place] [id number] [place] [address] 51848-
9452     

 

 [place] [address] [address]-
3444  17 2017  Vaginal yeast infection B37.3

 

 [place] [address] [place] [id number] [place] [address] 81508-
4316  14 2017  Anxiety F41.9

 

 [place] [address] [address]1966-
2275    Type 2 diabetes mellitus without complication, without long-
term current use of insulin E11.9

 

 [place] [address] [place] [id number] [place] [address] 91794-
[phone]     

 

 [facility]  [address] [place] [id number] [place] [address] 66386-
2421     

 

 [facility]  [address] [place] [id number] [place] [address] 70490-
2303  24 Oct, 2017  Arthritis M19.90

 

 [place] [address] [place] [id number] [place] [address] 39476-
8162  16 Oct, 2017   

 

 [place] [address] [place] [id number] [place] [address] 94066-
1987  04 Oct, 2017   

 

 [place] [address] [place] [id number] [place] [address] 75592-
2898  05 Sep, 2017   

 

 [place] [address] [place] [id number] [place] [address] 29666-
4712  25 Aug, 2017  Venous insufficiency I87.2 and Venous stasis dermatitis of 
right lower extremity I87.2

 

 [place] [address] [place] [id number] [place] [address] 87895-
3944  21 Aug, 2017  Essential hypertension I10

 

 [place] [address] [place] [id number] [place] [address] 77854-
5359     

 

 [place] [address] [place] [id number] [place] [address] 05776-
9409    Type 2 diabetes mellitus without complication, without long-
term current use of insulin E11.9 and Essential hypertension I10

 

 [place] [address] [place] [id number] [place] [address] 32871-
4070    Essential hypertension I10 and Type 2 diabetes mellitus 
without complication, without long-term current use of insulin E11.9

 

 [place] [address], [place] [address]-
5043    Chronic kidney disease, stage 3 (moderate) N18.3 ; Anemia 
of chronic disease D63.8 and Type 2 diabetes mellitus without complication, 
without long-term current use of insulin E11.9

 

 [place] [address] 7[address]76-
4771  14 2017  Type 2 diabetes mellitus without complication, without long-
term current use of insulin E11.9 ; Iron deficiency anemia secondary to 
inadequate dietary iron intake D50.8 ; Arthritis M19.90 and Lumbago with 
sciatica, left side M54.42

 

 [place] [address] [address]-
1613    Arthritis M19.90 and Anxiety F41.9

 

 [place] [address] 6[address]13-
1814    Type 2 diabetes mellitus without complication, without long-
term current use of insulin E11.9 and Essential hypertension I10

 

 [address] [address]-
3066  22 May, 2017  Anxiety F41.9

 

 [place] [address] [address]-
0547  18 May, 2017  Monilial rash B37.2

 

 [place] [address] 8[address]63-
3698  16 May, 2017   

 

 [place] [address] [place] [id number] [place] [address] 89573-
8334  15 May, 2017   

 

 [place] [address] [address]-
1763  11 May, 2017   

 

 [place] [address] [address]-
6071  11 May, 2017  Gastroesophageal reflux disease, esophagitis presence not 
specified K21.9

 

 [place] [address] [address]12-
1749  09 May, 2017  Arthritis M19.90

 

 [place] [address] [address]-
9959  09 May, 2017  Anxiety F41.9 ; Arthritis M19.90 and Essential hypertension 
I10

 

 [place] [address] [place] [id number] [place] [address] 18569-
4890  05 May, 2017  Essential hypertension I10 and Anxiety F41.9

 

 [place] [address] [place] [id number] [place] [address] 53166-
3327     

 

 [place] [address] 7[address]76-
5738    Arthritis M19.90 and Anxiety F41.9

 

 [place] [address] 6[address]64-
4878    Type 2 diabetes mellitus without complication, without long-
term current use of insulin E11.9 ; Cellulitis of right lower extremity L03.115 
and Arthritis M19.90

 

 [place] [address] [place] [id number] [place] [address] 44658-
7807  28 Mar, 2017   

 

 [place] [address] [address]-
2053  20 Mar, 2017  Type 2 diabetes mellitus without complication, without long-
term current use of insulin E11.9 ; Bronchitis J40 and Arthritis M19.90

 

 [place] [address] [place] [id number] [place] [address] 64961-
1404  16 Mar, 2017   

 

 [place] [address] [place] [id number] [place] [address] 27927-
2694    Anxiety F41.9

 

 [facility]  [address] [place] [address]
[phone]    Dysuria R30.0 ; Acute cystitis with hematuria N30.01 and 
Vaginal yeast infection B37.3

 

 [place] [address] [place] [id number] [place] [address] 76095-
9625    Arthritis M19.90

 

 [facility] [place]  [address] [place] [id number] [place] [address] 69135
-[phone]    Strep pharyngitis J02.0 and Sore throat J02.9

 

 [facility]  [address] [place] [id number] [place] [address] 32065-
3163     

 

 [place] [address] [address]-
8760    Type 2 diabetes mellitus without complication, without long-
term current use of insulin E11.9

 

 [place] [address] [address]65-
9580  14 Dec, 2016   

 

 [place] [address] [address]-
4373  13 Dec, 2016  Seasonal allergic rhinitis due to pollen J30.1

 

 [facility] [place] [facility]  [address] [address]
[phone]  09 Dec, 2016  Impacted cerumen of right ear H61.21 and Seasonal allergic 
rhinitis due to pollen J30.1

 

 [place] [address] [address]-
7785  09 Dec, 2016   

 

 [place] [address] [address]65-
7115  05 Dec, 2016   

 

 [place] [address] 65896-
2886    Type 2 diabetes mellitus without complication, without long-
term current use of insulin E11.9 ; Anxiety F41.9 ; Essential hypertension I10 
and Encounter for immunization Z23

 

 [place] [address] [place] [id number] [place] [address] 36679-
9852    Essential hypertension I10

 

 [place] [address] [place] [id number] [place] [address] 57271-
2015     

 

 [place] [address] [address]75-
8408     

 

 [place] [address] 92698-
3982    Essential hypertension I10

 

 [place] [address] [place] [address]-
2916     

 

 [facility]  [address] [place] [id number] [address]-
4539  23 Sep, 2016   

 

 [facility]  [address] [place] [id number] [address]-
1766  01 Sep, 2016   

 

 [facility]  [address] [place] [address] [id number] [address]-
0466  30 Aug, 2016   

 

 [facility]  [address] [address]-
2274  24 Aug, 2016   

 

 [facility]  [address] [place] [id number] [address]-
2159  22 Aug, 2016   

 

 [facility]  [address] [place] [address] [id number] [address]-
1274  22 Aug, 2016  Type 2 diabetes mellitus without complication, without long-
term current use of insulin E11.9 ; Essential hypertension I10 ; Acute non-
recurrent maxillary sinusitis J01.00 ; Arthritis M19.90 ; Lumbago with sciatica
, left side M54.42 ; Other chronic pain G89.29 and Anxiety F41.9







IMMUNIZATIONS

No Known Immunizations



SOCIAL HISTORY

Never Assessed



REASON FOR VISIT

Alprazolam



PLAN OF CARE





VITAL SIGNS





MEDICATIONS







 Medication  Instructions  Dosage  Frequency  Start Date  End Date  Duration  
Status

 

 Alprazolam 0.5 MG  Orally Three times a day  1 tablet  8h        30 days  
Active







RESULTS

No Results



PROCEDURES

No Known procedures



INSTRUCTIONS





MEDICATIONS ADMINISTERED

No Known Medications



MEDICAL (GENERAL) HISTORY







 Type  Description  Date

 

 Medical History  type II diabetes   

 

 Medical History  hypertension   

 

 Medical History  Arthritis   

 

 Medical History  skin cancer-scalp   

 

 Surgical History  hysterectomy   

 

 Surgical History  skin cancer removal-scalp   

 

 Hospitalization History  Surgery(s) only   

 

 Hospitalization History  dehydration  2017

 

 Hospitalization History  bronchitis  2107

## 2019-02-08 NOTE — DIAGNOSTIC IMAGING REPORT
PROCEDURE: US left lower extremity venous.



TECHNIQUE: Multiple real-time grayscale images were obtained over

the left lower extremity in various projections. Additional

duplex Doppler and color Doppler images were also obtained.



INDICATION: Left lower extremity redness and pain.



There is no evidence of a left lower extremity DVT. Left lower

extremity 3-D venous system shows normal compressibility with

normal response to augmentation and Valsalva. No fluid collection

or mass is seen. 



IMPRESSION: No evidence of left lower extremity DVT.



Dictated by: 



  Dictated on workstation # [id number]

## 2019-02-08 NOTE — XMS REPORT
[facility]

 Created on: 10/07/2017



Richi Sandrita

External Reference #: [id number]

: 1934

Sex: Female



Demographics







 Address  [address] [place]  [address]

 

 Preferred Language  Unknown

 

 Marital Status  Unknown

 

 Ep[employer] Affiliation  Unknown

 

 Race  Unknown

 

 Ethnic Group  Unknown





Author







 Author  YULISA RANGEL

 

 [facility] [address]

 

 Address  [address] [place]  [address]



 

 Phone  [phone]







Care Team Providers







 Care Team Member Name  Role  Phone

 

 YULISA RANGEL  Unavailable  [phone]







PROBLEMS







 Type  Condition  ICD9-CM Code  PSV50-LZ Code  Onset Dates  Condition Status  
SNOMED Code

 

 Problem  Arthritis     M19.90     Active  1321393

 

 Problem  Anxiety     F41.9     Active  86786145

 

 Problem  Essential hypertension     I10     Active  38310498

 

 Problem  Lumbago with sciatica, left side     M54.42     Active  327793714

 

 Problem  Venous insufficiency     I87.2     Active  42523886

 

 Problem  Anemia of chronic disease     D63.8     Active  647411302

 

 Problem  Seasonal allergic rhinitis due to pollen     J30.1     Active  
48454924

 

 Problem  Type 2 diabetes mellitus without complication, without long-term 
current use of insulin     E11.9     Active  098622935

 

 Problem  Chronic kidney disease, stage 3 (moderate)     N18.3     Active  
097727760

 

 Problem  Gastroesophageal reflux disease, esophagitis presence not specified  
   K21.9     Active  934694247







ALLERGIES

No Known Allergies



SOCIAL HISTORY

Never Assessed



PLAN OF CARE







 Activity  Details









  









 Follow Up  4 Months Reason:







VITAL SIGNS







 Height  56 in  2017

 

 Weight  176.2 lbs  2017

 

 Temperature  97.8 degrees Fahrenheit  2017

 

 Heart Rate  91 bpm  2017

 

 Respiratory Rate  22   2017

 

 Oximetry  95 %  2017

 

 BMI  39.50 kg/m2  2017

 

 Blood pressure systolic  128 mmHg  2017

 

 Blood pressure diastolic  72 mmHg  2017







MEDICATIONS







 Medication  Instructions  Dosage  Frequency  Start Date  End Date  Duration  
Status

 

 Metformin HCl 500 MG  Orally Twice a day  1 tablet with meals  12h           
Active

 

 Metformin HCl 500  Orally Twice a day  1 tablet with meals  12h        30  
Active

 

 Aspir-81 81 MG  Orally Once a day  1 tablet  24h           Active

 

 Timolol 0.5 %  Ophthalmic Twice a day  1 drop into affected eye  12h           
Active

 

 Flonase Allergy Relief 50 MCG/ACT  Nasally Once a day  1 spray in each nostril
  24h  13 Dec, 2016     30 day(s)  Active

 

 Metoprolol Tartrate 25  Orally Twice a day  1 tablet with food  12h        30  
Active

 

 Lisinopril 5  Orally Once a day  1 tablet  24h        30  Active

 

 Glimepiride 4 MG  Orally Once a day  1 tablet with breakfast or the first main 
meal of the day  24h           Active

 

 Metoprolol Tartrate 25 MG  Orally Twice a day  1 tablet with food  12h        
   Active

 

 Lisinopril 5 mg  Orally Once a day  1 tablet  24h           Active

 

 Hydrochlorothiazide 25 MG  Orally Once a day  1 tablet  24h           Active

 

 Xanax 0.5     TAKE ONE TABLET BY MOUTH THREE TIMES A DAY AS NEEDED FOR ANXIETY
           30  Active

 

 Cefdinir 300 MG  Orally 2 times a day  1 capsule  12h  20 Mar, 2017  27 Mar, 
2017  07 days  Active

 

 Acetaminophen 500 MG  Orally every 6 hrs  2 capsules as needed  6h           
Active

 

 Alprazolam 0.5 MG  Orally Three times a day  1 tablet  8h        30 days  
Active

 

 ProAir  (90 Base) MCG/ACT  Inhalation every 4 hrs  2 puffs as needed  
4h           Active

 

 Tramadol-Acetaminophen 37.5-325 MG  Orally every 4 hrs  2 tablets as needed  
4h        28 days  Active







RESULTS







 Name  Result  Date  Reference Range

 

 A1C (IN HOUSE)     2017   

 

 A1C IN HOUSE  6.4     4.3 - 5.6 %

 

 Previous A1c  6.7      

 

 Lot   [id number]      

 

 Exp date  2019      







PROCEDURES







 Procedure  Date Ordered  Result  Body Site

 

 MEASURE BLOOD OXYGEN LEVEL  2017      

 

 GLYCATED HEMOGLOBIN TEST  2017      

 

 [facility] VISIT ESTABLISHED PATIENT  2017      







IMMUNIZATIONS

No Known Immunizations



MEDICAL (GENERAL) HISTORY







 Type  Description  Date

 

 Medical History  type II diabetes   

 

 Medical History  hypertension   

 

 Medical History  Arthritis   

 

 Medical History  skin cancer-scalp   

 

 Surgical History  hysterectomy   

 

 Surgical History  skin cancer removal-scalp   

 

 Hospitalization History  Surgery(s) only   

 

 Hospitalization History  dehydration  2017

 

 Hospitalization History  bronchitis  2107

## 2019-02-08 NOTE — XMS REPORT
[facility]

 Created on: 2018



Richi Sandrita

External Reference #: [id number]

: 1934

Sex: Female



Demographics







 Address  [address] [place]  [address]

 

 Preferred Language  Unknown

 

 Marital Status  Unknown

 

 Jewish Affiliation  Unknown

 

 Race  Unknown

 

 Ethnic Group  Unknown





Author







 Author  YULISA RANGEL

 

 [facility]

 

 Address  [address] [place]  [address]



 

 Phone  [phone]







Care Team Providers







 Care Team Member Name  Role  Phone

 

 YULISA RANGEL  Unavailable  [phone]







PROBLEMS







 Type  Condition  ICD9-CM Code  GBY64-DP Code  Onset Dates  Condition Status  
SNOMED Code

 

 Problem  Essential hypertension     I10     Active  98916248

 

 Problem  Type 2 diabetes mellitus without complication, without long-term 
current use of insulin     E11.9     Active  456961090

 

 Problem  Anxiety     F41.9     Active  48624781

 

 Problem  Lumbago with sciatica, left side     M54.42     Active  775301480

 

 Problem  Arthritis     M19.90     Active  9822055

 

 Problem  Iron deficiency anemia, unspecified iron deficiency anemia type     
D50.9     Active  57063252

 

 Problem  Venous insufficiency     I87.2     Active  03081836

 

 Problem  Gastroesophageal reflux disease, esophagitis presence not specified  
   K21.9     Active  229015294

 

 Problem  Seasonal allergic rhinitis due to pollen     J30.1     Active  
06756163

 

 Problem  Anemia of chronic disease     D63.8     Active  040907835

 

 Problem  Chronic kidney disease, stage 3 (moderate)     N18.3     Active  
038136983







ALLERGIES

No Information



ENCOUNTERS







 Encounter  Location  Date  Diagnosis

 

 [place] [address] [place] [address]603-
2822  16 Aug, 2018   

 

 [place] [address] [place] [id number] [place] [address] 67432-
2130  06 Aug, 2018   

 

 [place] [address] [place] [address] 57125-
2771    Essential hypertension I10

 

 [place] [address] [place] [id number] [place] [address] 23937-
3714    Type 2 diabetes mellitus without complication, without long-
term current use of insulin E11.9 ; Essential hypertension I10 and Anemia of 
chronic disease D63.8

 

 [place] [address] [place] [address]-
8577    Arthritis M19.90 and Essential hypertension I10

 

 [facility]  [address] [address]-
2631  15 May, 2018   

 

 [facility]  [address] [place] [id number] [place] [address] 26189-
6051  15 May, 2018   

 

 [facility]  [address] [address]-
3753  15 May, 2018  Medicare annual wellness visit, initial Z00.00 ; Type 2 
diabetes mellitus without complication, without long-term current use of 
insulin E11.9 ; Chronic kidney disease, stage 3 (moderate) N18.3 ; Essential 
hypertension I10 ; Gastroesophageal reflux disease, esophagitis presence not 
specified K21.9 ; Anxiety F41.9 ; Arthritis M19.90 and Encounter for 
immunization Z23

 

 [place] [address] [address]-
7500  03 May, 2018  Essential hypertension I10

 

 [facility]  [address] [place] [id number] [place] [address]32-
5310    Arthritis M19.90

 

 [facility]  [address] [address]-
4650     

 

 [facility]  [address] [place] [id number] [place] [address] 05278-
8573     

 

 [facility]  [address] [place] [id number] [place] [address] 22368-
5475    Essential hypertension I10

 

 [facility]  [address] [place] [id number] [place] [address]17
-[phone]  27 Mar, 2018  Dysuria R30.0 and Vaginal candida B37.3

 

 [facility]  [address] [address]-
6500  08 Mar, 2018  Arthritis M19.90

 

 [facility]  [address] [place] [id number] [place] [address] 45504-
8804     

 

 [facility]  [address] [place] [id number] [place] [address] 52248-
5060    Type 2 diabetes mellitus without complication, without long-
term current use of insulin E11.9 ; Lumbago with sciatica, left side M54.42 ; 
Arthritis M19.90 ; Iron deficiency anemia, unspecified iron deficiency anemia 
type D50.9 and BMI 40.0-44.9, adult Z68.41

 

 [place] [address] [address]-
9142     

 

 [place] [address] [address]-
6926     

 

 [place] [address] [address]-
4987    Arthritis M19.90 and Anxiety F41.9

 

 [place] [address] [address]759-
1367     

 

 [place] [address] [place] [id number] [place] [address] 28796-
0719  18 Dec, 2017  Anxiety F41.9

 

 [place] [address] [address]-
8667     

 

 [place] [address] [place] [id number] [place] [address] 44768-
1226     

 

 [place] [address] [place] [id number] [place] [address] 09481-
2003    Vaginal yeast infection B37.3

 

 [place] [address] [place] [id number] [place] [address] 13981-
3019  14 2017  Anxiety F41.9

 

 [place] [address] [address]-
2161    Type 2 diabetes mellitus without complication, without long-
term current use of insulin E11.9

 

 [place] [address] [place] [id number] [place] [address] 13024-
4458  08 2017   

 

 [place] [address] [address]-
2395     

 

 [place] [address] [place] [id number] [place] [address] 75386-
7505  24 Oct, 2017  Arthritis M19.90

 

 [facility]  [address] [address]-
3894  16 Oct, 2017   

 

 [place] [address] [place] [id number] [place] [address] 23125-
9159  04 Oct, 2017   

 

 [place] [address] [place] [address] 10037-
6015  05 Sep, 2017   

 

 [place] [address] [place] [id number] [place] [address] 10942-
1721  25 Aug, 2017  Venous insufficiency I87.2 and Venous stasis dermatitis of 
right lower extremity I87.2

 

 [place] [address] [place] [id number] [place] [address] 30479-
2339  21 Aug, 2017  Essential hypertension I10

 

 [place] [address] [place] [id number] [place] [address] 00727-
8664     

 

 [place] [address] [place] [id number] [place] [address] 00089-
2204    Type 2 diabetes mellitus without complication, without long-
term current use of insulin E11.9 and Essential hypertension I10

 

 [place] [address] [address]-
0092    Essential hypertension I10 and Type 2 diabetes mellitus 
without complication, without long-term current use of insulin E11.9

 

 [place] [address] [address]-
1244    Chronic kidney disease, stage 3 (moderate) N18.3 ; Anemia 
of chronic disease D63.8 and Type 2 diabetes mellitus without complication, 
without long-term current use of insulin E11.9

 

 [place] [address] [address]-
4949    Type 2 diabetes mellitus without complication, without long-
term current use of insulin E11.9 ; Iron deficiency anemia secondary to 
inadequate dietary iron intake D50.8 ; Arthritis M19.90 and Lumbago with 
sciatica, left side M54.42

 

 [place] [address] [address]-
0940    Arthritis M19.90 and Anxiety F41.9

 

 [place] [address] [address]-
4924    Type 2 diabetes mellitus without complication, without long-
term current use of insulin E11.9 and Essential hypertension I10

 

 [place] [address] [address]-
4554  22 May, 2017  Anxiety F41.9

 

 [place] [address] [address]-
2686  18 May, 2017  Monilial rash B37.2

 

 [place] [address] [address]-
1553  16 May, 2017   

 

 [place] [address] [address]-
1761  15 May, 2017   

 

 [place] [address] [address]-
4339  11 May, 2017   

 

 [place] [address] [address]-
9462  11 May, 2017  Gastroesophageal reflux disease, esophagitis presence not 
specified K21.9

 

 [place] [address] [place] [id number] [place] [address] 13147-
3537  09 May, 2017  Arthritis M19.90

 

 [place] [address] [address]-
8133  09 May, 2017  Anxiety F41.9 ; Arthritis M19.90 and Essential hypertension 
I10

 

 [place] [address] [address]-
5029  05 May, 2017  Essential hypertension I10 and Anxiety F41.9

 

 [place] [address] [place] [address]-
5664     

 

 [facility]  [address] [place] [id number] [place] [address] 21779-
4300    Arthritis M19.90 and Anxiety F41.9

 

 [facility]  [address] [place] [id number] [place] [address] 85513-
1422    Type 2 diabetes mellitus without complication, without long-
term current use of insulin E11.9 ; Cellulitis of right lower extremity L03.115 
and Arthritis M19.90

 

 [place] [address] [place] [id number] [place] [address] 47922-
1283  28 Mar, 2017   

 

 [place] [address] [address]-
9653  20 Mar, 2017  Type 2 diabetes mellitus without complication, without long-
term current use of insulin E11.9 ; Bronchitis J40 and Arthritis M19.90

 

 [place] [address] [place] [id number] [place] [address] 70689-
6571  16 Mar, 2017   

 

 [place] [address] [place] [id number] [place] [address] 20860-
3828    Anxiety F41.9

 

 [facility]  [address] [place] [id number] [place] [address] 36427
-[phone]    Dysuria R30.0 ; Acute cystitis with hematuria N30.01 and 
Vaginal yeast infection B37.3

 

 [place] [address] [place] [id number] [place] [address] 18950-
1137    Arthritis M19.90

 

 [facility]  [address] [place] [id number] [place] [address] 39445
-[phone]    Strep pharyngitis J02.0 and Sore throat J02.9

 

 [place] [address] [place] [id number] [place] [address] 48331-
0528     

 

 [place] [address] [place] [id number] [place] [address] 65906-
0363    Type 2 diabetes mellitus without complication, without long-
term current use of insulin E11.9

 

 [facility]  [address] [place] [id number] [place] [address] 45669-
0794  14 Dec, 2016   

 

 [facility]  [address] [address]-
1653  13 Dec, 2016  Seasonal allergic rhinitis due to pollen J30.1

 

 [facility]  [address] [address]
-3713  09 Dec, 2016  Impacted cerumen of right ear H61.21 and Seasonal allergic 
rhinitis due to pollen J30.1

 

 [facility]  [address] [address]-
9286  09 Dec, 2016   

 

 [facility]  [address] [address]-
4951  05 Dec, 2016   

 

 [place] [address] [address]-
2452    Type 2 diabetes mellitus without complication, without long-
term current use of insulin E11.9 ; Anxiety F41.9 ; Essential hypertension I10 
and Encounter for immunization Z23

 

 [place] [address] [address]-
7091    Essential hypertension I10

 

 [place] [address] 11895-
2228     

 

 [place] [address] [address]-
9456     

 

 [facility]  [address] [place] [id number] [place] [address] 34672-
8714    Essential hypertension I10

 

 [place] [address] [address]-
1847     

 

 [place] [address] [place] [id number] [place] [address] 56282-
0551  23 Sep, 2016   

 

 [place] [address] [address]-
0775  01 Sep, 2016   

 

 [facility]  [address] [facility] [id number] [address]-
5122  30 Aug, 2016   

 

 [facility]  [address] [facility] [id number] [address]-
3326  24 Aug, 2016   

 

 [facility]  [address] [facility] [id number] [address]-
7293  22 Aug, 2016   

 

 [facility]  [address] [facility] [id number] [address]-
5926  22 Aug, 2016  Type 2 diabetes mellitus without complication, without long-
term current use of insulin E11.9 ; Essential hypertension I10 ; Acute non-
recurrent maxillary sinusitis J01.00 ; Arthritis M19.90 ; Lumbago with sciatica
, left side M54.42 ; Other chronic pain G89.29 and Anxiety F41.9







IMMUNIZATIONS

No Known Immunizations



SOCIAL HISTORY

Never Assessed



REASON FOR VISIT

Ultracet and alprazolam-



PLAN OF CARE





VITAL SIGNS





MEDICATIONS







 Medication  Instructions  Dosage  Frequency  Start Date  End Date  Duration  
Status

 

 Tramadol-Acetaminophen 37.5-325 MG  Orally every 4 hrs  2 tablets as needed  
4h        28 days  Active

 

 Alprazolam 0.5 MG  Orally Three times a day  1 tablet  8h        30 days  
Active







RESULTS

No Results



PROCEDURES

No Known procedures



INSTRUCTIONS





MEDICATIONS ADMINISTERED

No Known Medications



MEDICAL (GENERAL) HISTORY







 Type  Description  Date

 

 Medical History  type II diabetes   

 

 Medical History  hypertension   

 

 Medical History  Arthritis   

 

 Medical History  skin cancer-scalp   

 

 Surgical History  hysterectomy   

 

 Surgical History  skin cancer removal-scalp   

 

 Hospitalization History  Surgery(s) only   

 

 Hospitalization History  dehydration  2017

 

 Hospitalization History  bronchitis  2107

## 2019-02-08 NOTE — XMS REPORT
[facility]

 Created on: 2018



Sandrita Stoddard

External Reference #: [id number]

: 1934

Sex: Female



Demographics







 Address  [address] [place]  [address]

 

 Preferred Language  Unknown

 

 Marital Status  Unknown

 

 Pentecostal Affiliation  Unknown

 

 Race  Unknown

 

 Ethnic Group  Unknown





Author







 Author  YULISA RANGEL

 

 [facility]

 

 Address  [address] [place]  [address]



 

 Phone  [phone]







Care Team Providers







 Care Team Member Name  Role  Phone

 

 YULISA RANGEL  Unavailable  [phone]







PROBLEMS







 Type  Condition  ICD9-CM Code  [id number] Code  Onset Dates  Condition Status  
SNOMED Code

 

 Problem  Essential hypertension     I10     Active  97871570

 

 Problem  Type 2 diabetes mellitus without complication, without long-term 
current use of insulin     E11.9     Active  057175729

 

 Problem  Anxiety     F41.9     Active  25171564

 

 Problem  Lumbago with sciatica, left side     M54.42     Active  552353785

 

 Problem  Arthritis     M19.90     Active  6661950

 

 Problem  Iron deficiency anemia, unspecified iron deficiency anemia type     
D50.9     Active  08242542

 

 Problem  Venous insufficiency     I87.2     Active  36326963

 

 Problem  Gastroesophageal reflux disease, esophagitis presence not specified  
   K21.9     Active  440577801

 

 Problem  Seasonal allergic rhinitis due to pollen     J30.1     Active  
47037369

 

 Problem  Anemia of chronic disease     D63.8     Active  838055255

 

 Problem  Chronic kidney disease, stage 3 (moderate)     N18.3     Active  
[id number]







ALLERGIES

No Information



ENCOUNTERS







 Encounter  Location  Date  Diagnosis

 

 [place] [id number] [place] [address]382-
4511  15 May, 2018  Medicare annual wellness visit, initial Z00.00

 

 [facility] [place]  [id number] [place] [address] 04326-
9705  03 May, 2018  Essential hypertension I10

 

 [facility] [place]  [id number] [place] [address] 70490-
8926    Arthritis M19.90

 

 [facility] [place]  [id number] [place] [address] 00865-
5238     

 

 [place] [id number] [place] [address] 79582-
4255     

 

 [facility] [place]  [id number] [place] [address] 13887-
8546    Essential hypertension I10

 

 [facility]  [id number] [place] [address] [phone]
[phone]  27 Mar, 2018  Dysuria R30.0 and Vaginal candida B37.3

 

 [place] [address] [address]-
6684  08 Mar, 2018  Arthritis M19.90

 

 [facility] [place]  [address] [address]-
4915     

 

 [place] [address] [address]-
3331    Type 2 diabetes mellitus without complication, without long-
term current use of insulin E11.9 ; Lumbago with sciatica, left side M54.42 ; 
Arthritis M19.90 ; Iron deficiency anemia, unspecified iron deficiency anemia 
type D50.9 and BMI 40.0-44.9, adult Z68.41

 

 [place] [id number] [place] [address] 57700-
8684     

 

 [place] [address] [address]-
1700     

 

 [place] [id number] [place] [address] 94232-
5266    Arthritis M19.90 and Anxiety F41.9

 

 [place] [id number] [place] [address] 97645-
9579     

 

 [place] [id number] [place] [address] 83101-
7832  18 Dec, 2017  Anxiety F41.9

 

 [place] [id number] [place] [address] 33568-
6259     

 

 [place] [id number] [place] [address] 27444-
6467     

 

 [place] [address], [place] [address]-
6134  17 2017  Vaginal yeast infection B37.3

 

 [facility] [place]  [id number] [place] [address] 69060-
3609  14 2017  Anxiety F41.9

 

 [facility] [place]  [id number] [place] [address] 36628-
9020    Type 2 diabetes mellitus without complication, without long-
term current use of insulin E11.9

 

 [facility] [place]  [id number] [place] [address] 36516-
7361     

 

 [facility] [place]  [id number] [place] [address] 88052-
9359     

 

 [facility] [place]  [id number] [place] [address] 26099-
6394  24 Oct, 2017  Arthritis M19.90

 

 [facility] [place]  [id number] [place] [address] 91090-
9361  16 Oct, 2017   

 

 [facility] [place]  [id number] [place] [address] 35440-
1848  04 Oct, 2017   

 

 [facility] [place]  [id number] [place] [address] 84462-
6137  05 Sep, 2017   

 

 [facility] [place]  [address] [place] [address] 61782-
9818  25 Aug, 2017  Venous insufficiency I87.2 and Venous stasis dermatitis of 
right lower extremity I87.2

 

 [facility] [place]  [address] [place] [address] 79252-
2863  21 Aug, 2017  Essential hypertension I10

 

 [facility] [place]  [address] [place] [address] 98327-
5497     

 

 [facility] [place]  [address] [place] [address] 71722-
7828    Type 2 diabetes mellitus without complication, without long-
term current use of insulin E11.9 and Essential hypertension I10

 

 [facility] [place]  [id number], [place] [address]-
6700    Essential hypertension I10 and Type 2 diabetes mellitus 
without complication, without long-term current use of insulin E11.9

 

 [place] [id number] [place] [address] 97814-
8406    Chronic kidney disease, stage 3 (moderate) N18.3 ; Anemia 
of chronic disease D63.8 and Type 2 diabetes mellitus without complication, 
without long-term current use of insulin E11.9

 

 [place] [id number] [place] [address] 87820-
7457  14 2017  Type 2 diabetes mellitus without complication, without long-
term current use of insulin E11.9 ; Iron deficiency anemia secondary to 
inadequate dietary iron intake D50.8 ; Arthritis M19.90 and Lumbago with 
sciatica, left side M54.42

 

 [place] [id number] [place] [address] 91213-
7754    Arthritis M19.90 and Anxiety F41.9

 

 [place] [id number] [place] [address] 65574-
1114    Type 2 diabetes mellitus without complication, without long-
term current use of insulin E11.9 and Essential hypertension I10

 

 [address] [place] [address] 08206-
3264  22 May, 2017  Anxiety F41.9

 

 [place] [id number] [address]-
4130  18 May, 2017  Monilial rash B37.2

 

 [place] [address] [place] [address] 02941-
0681  16 May, 2017   

 

 [place] [id number] [place] [address] 41702-
5149  15 May, 2017   

 

 [place] [id number] [place] [address] 97892-
6158  11 May, 2017   

 

 [place] [id number] [place] [address] 66154-
0985  11 May, 2017  Gastroesophageal reflux disease, esophagitis presence not 
specified K21.9

 

 [facility] [place]  [id number] [place] [address] 55976-
3654  09 May, 2017  Arthritis M19.90

 

 [facility] [place]  [address] [address]-
5175  09 May, 2017  Anxiety F41.9 ; Arthritis M19.90 and Essential hypertension 
I10

 

 [facility] [place]  [address] [address]758-
8486  05 May, 2017  Essential hypertension I10 and Anxiety F41.9

 

 [facility] [place]  [address] [address]-
6686     

 

 [place] [address] [address]-
3514    Arthritis M19.90 and Anxiety F41.9

 

 [facility] [place]  [address] [address]-
0234    Type 2 diabetes mellitus without complication, without long-
term current use of insulin E11.9 ; Cellulitis of right lower extremity L03.115 
and Arthritis M19.90

 

 [facility] [place]  [id number] [place] [address] 02059-
9008  28 Mar, 2017   

 

 [place] [id number] [place] [address] 06616-
7825  20 Mar, 2017  Type 2 diabetes mellitus without complication, without long-
term current use of insulin E11.9 ; Bronchitis J40 and Arthritis M19.90

 

 [facility] [place]  [id number] [place] [address] 58319-
1166  16 Mar, 2017   

 

 [place] [address] 016[address]-
3257    Anxiety F41.9

 

 [facility]  [id number] [place] [address] 20373
[phone]    Dysuria R30.0 ; Acute cystitis with hematuria N30.01 and 
Vaginal yeast infection B37.3

 

 [address] [address]-
2739    Arthritis M19.90

 

 [facility] [address] [address]
[phone]    Strep pharyngitis J02.0 and Sore throat J02.9

 

 [address] [address]-
3345     

 

 [address] [address]-
7853    Type 2 diabetes mellitus without complication, without long-
term current use of insulin E11.9

 

 [address] [address]-
7642  14 Dec, 2016   

 

 [address] [address]-
4662  13 Dec, 2016  Seasonal allergic rhinitis due to pollen J30.1

 

 [facility] [address] [address]
[phone]  09 Dec, 2016  Impacted cerumen of right ear H61.21 and Seasonal allergic 
rhinitis due to pollen J30.1

 

 [address] [address]-
5028  09 Dec, 2016   

 

 [address] [address]-
4485  05 Dec, 2016   

 

 [address] [address]-
8920    Type 2 diabetes mellitus without complication, without long-
term current use of insulin E11.9 ; Anxiety F41.9 ; Essential hypertension I10 
and Encounter for immunization Z23

 

 [address] [address]-
0428    Essential hypertension I10

 

 [address] [address]-
1447     

 

 [facility] [place]  [address] [address]-
5383     

 

 [facility] [place]  [address], [place] [address]-
0859    Essential hypertension I10

 

 [facility] [place]  [address] [address]-
6048     

 

 [facility] [place]  [id number] [place] [address] 85787-
4336  23 Sep, 2016   

 

 [facility] [place]  [address] [address]-
9349  01 Sep, 2016   

 

 [facility] [place]  [id number] [place] [address] 82253-
4259  30 Aug, 2016   

 

 [facility] [place]  [address] [address]-
0674  24 Aug, 2016   

 

 [facility] [place]  [address], [place] [address]-
2487  22 Aug, 2016   

 

 [facility] [place]  [address] [address]-
8170  22 Aug, 2016  Type 2 diabetes mellitus without complication, without long-
term current use of insulin E11.9 ; Essential hypertension I10 ; Acute non-
recurrent maxillary sinusitis J01.00 ; Arthritis M19.90 ; Lumbago with sciatica
, left side M54.42 ; Other chronic pain G89.29 and Anxiety F41.9







IMMUNIZATIONS

No Known Immunizations



SOCIAL HISTORY

Never Assessed



REASON FOR VISIT

Refill request



PLAN OF CARE





VITAL SIGNS





MEDICATIONS

Unknown Medications



RESULTS

No Results



PROCEDURES

No Known procedures



INSTRUCTIONS





MEDICATIONS ADMINISTERED

No Known Medications



MEDICAL (GENERAL) HISTORY







 Type  Description  Date

 

 Medical History  type II diabetes   

 

 Medical History  hypertension   

 

 Medical History  Arthritis   

 

 Medical History  skin cancer-scalp   

 

 Surgical History  hysterectomy   

 

 Surgical History  skin cancer removal-scalp   

 

 Hospitalization History  Surgery(s) only   

 

 Hospitalization History  dehydration  2017

 

 Hospitalization History  bronchitis  2107

## 2019-02-08 NOTE — XMS REPORT
[facility]

 Created on: 2018



Richi Sandrita

External Reference #: [id number]

: 1934

Sex: Female



Demographics







 Address  [address] [place]  [address]

 

 Preferred Language  Unknown

 

 Marital Status  Unknown

 

 Buddhist Affiliation  Unknown

 

 Race  Unknown

 

 Ethnic Group  Unknown





Author







 Author  YULISA RANGEL

 

 [facility]

 

 Address  [address] [place]  [address]



 

 Phone  [phone]







Care Team Providers







 Care Team Member Name  Role  Phone

 

 YULISA RANGEL  Unavailable  [phone]







PROBLEMS







 Type  Condition  ICD9-CM Code  EXZ90-UZ Code  Onset Dates  Condition Status  
SNOMED Code

 

 Problem  Essential hypertension     I10     Active  40237144

 

 Problem  Type 2 diabetes mellitus without complication, without long-term 
current use of insulin     E11.9     Active  092775119

 

 Problem  Anxiety     F41.9     Active  60625043

 

 Problem  Lumbago with sciatica, left side     M54.42     Active  621350297

 

 Problem  Arthritis     M19.90     Active  1406033

 

 Problem  Iron deficiency anemia, unspecified iron deficiency anemia type     
D50.9     Active  40940983

 

 Problem  Venous insufficiency     I87.2     Active  10751767

 

 Problem  Gastroesophageal reflux disease, esophagitis presence not specified  
   K21.9     Active  200276758

 

 Problem  Seasonal allergic rhinitis due to pollen     J30.1     Active  
62595732

 

 Problem  Anemia of chronic disease     D63.8     Active  091318083

 

 Problem  Chronic kidney disease, stage 3 (moderate)     N18.3     Active  
708067400







ALLERGIES

No Information



ENCOUNTERS







 Encounter  Location  Date  Diagnosis

 

 [place] [address] [place] [address] 38186-
[phone]  14 Sep, 2018   

 

 [place] [address] [place] [id number] [place] [address] 36045-
2713  06 Sep, 2018  Essential hypertension I10 and Arthritis M19.90

 

 [facility]  [id number] [place] [address] 08290-
9180  28 Aug, 2018   

 

 [place] [address] [place] [id number] [place] [address] 42976-
2735  16 Aug, 2018   

 

 [place] [address] [id number] [place] [address] 12311-
[phone]  06 Aug, 2018   

 

 [place] [address] [place] [id number] [place] [address] 08359-
[phone]    Essential hypertension I10

 

 [facility]  [address] [address]-
9731    Type 2 diabetes mellitus without complication, without long-
term current use of insulin E11.9 ; Essential hypertension I10 and Anemia of 
chronic disease D63.8

 

 [facility]  [address] 8[address]30-
1076    Arthritis M19.90 and Essential hypertension I10

 

 [facility]  [address] [address]-
7354  15 May, 2018   

 

 [place] [address] 68272-
2945  15 May, 2018   

 

 [facility]  [address] [address]339-
8419  15 May, 2018  Medicare annual wellness visit, initial Z00.00 ; Type 2 
diabetes mellitus without complication, without long-term current use of 
insulin E11.9 ; Chronic kidney disease, stage 3 (moderate) N18.3 ; Essential 
hypertension I10 ; Gastroesophageal reflux disease, esophagitis presence not 
specified K21.9 ; Anxiety F41.9 ; Arthritis M19.90 and Encounter for 
immunization Z23

 

 [facility]  [id number] [place] [address] 89660-
4577  03 May, 2018  Essential hypertension I10

 

 [facility]  [id number] [place] [address] 41064-
0330    Arthritis M19.90

 

 [facility]  [id number] [place] [address] 27741-
8774     

 

 [facility]  [address] [address]755-
3218     

 

 [facility]  [id number] [place] [address] 57726-
3705    Essential hypertension I10

 

 [facility]  [address] [phone]
[phone]  27 Mar, 2018  Dysuria R30.0 and Vaginal candida B37.3

 

 [place] [address] [id number] [place] [address] 43511-
8273  08 Mar, 2018  Arthritis M19.90

 

 [facility]  [id number] [place] [address] 68519-
4329     

 

 [facility]  [id number] [place] [address] 29981-
8742    Type 2 diabetes mellitus without complication, without long-
term current use of insulin E11.9 ; Lumbago with sciatica, left side M54.42 ; 
Arthritis M19.90 ; Iron deficiency anemia, unspecified iron deficiency anemia 
type D50.9 and BMI 40.0-44.9, adult Z68.41

 

 [place] [address] [id number] [place] [address] 38070-
3618     

 

 [place] [address] [id number] [place] [address] 07980-
7995     

 

 [place] [address] [id number] [place] [address] 00640-
0100    Arthritis M19.90 and Anxiety F41.9

 

 [place] [address] [id number] [place] [address] 24092-
3101     

 

 [place] [address] [id number] [place] [address] 39133-
2882  18 Dec, 2017  Anxiety F41.9

 

 [place] [address] [id number] [place] [address] 15983-
7202     

 

 [place] [address] 6[address]-
0790     

 

 [place] [id number] [place] [address] 98187-
5851    Vaginal yeast infection B37.3

 

 [place] [address] [place] [id number] [place] [address] 22755-
3869    Anxiety F41.9

 

 [facility]  [id number] [place] [address] 38339-
1604    Type 2 diabetes mellitus without complication, without long-
term current use of insulin E11.9

 

 [facility]  [address] [place] [address] 04388-
4127     

 

 [facility]  [address] [id number] [place] [address] 70427-
1650     

 

 [facility]  [id number] [place] [address] 17387-
3174  24 Oct, 2017  Arthritis M19.90

 

 [facility]  [id number] [place] [address] 98659-
6616  16 Oct, 2017   

 

 [facility]  [address] [id number] [place] [address] 84730-
8504  04 Oct, 2017   

 

 [facility]  [id number] [place] [address] 27075-
5732  05 Sep, 2017   

 

 [facility]  [id number] [place] [address] 53355-
7326  25 Aug, 2017  Venous insufficiency I87.2 and Venous stasis dermatitis of 
right lower extremity I87.2

 

 [facility]  [address] [address]-
1889  21 Aug, 2017  Essential hypertension I10

 

 [facility]  [address] [place] [address] 06130-
4540     

 

 [facility]  [address] [place] [address] 77014-
0844    Type 2 diabetes mellitus without complication, without long-
term current use of insulin E11.9 and Essential hypertension I10

 

 [facility]  [address] [place] [address] 65205-
2379    Essential hypertension I10 and Type 2 diabetes mellitus 
without complication, without long-term current use of insulin E11.9

 

 [facility]  [address] [id number] [place] [address] 36696-
3312  18 2017  Chronic kidney disease, stage 3 (moderate) N18.3 ; Anemia 
of chronic disease D63.8 and Type 2 diabetes mellitus without complication, 
without long-term current use of insulin E11.9

 

 [place] [address] [place] [id number] [place] [address] 96286-
7339  14 2017  Type 2 diabetes mellitus without complication, without long-
term current use of insulin E11.9 ; Iron deficiency anemia secondary to 
inadequate dietary iron intake D50.8 ; Arthritis M19.90 and Lumbago with 
sciatica, left side M54.42

 

 [place] [address] 72339-
5665    Arthritis M19.90 and Anxiety F41.9

 

 [place] [address] [place] [id number] [place] [address] 07094-
9195    Type 2 diabetes mellitus without complication, without long-
term current use of insulin E11.9 and Essential hypertension I10

 

 [place] [address] [place] [id number] [place] [address] 28254-
8353  22 May, 2017  Anxiety F41.9

 

 [place] [address] [id number] [place] [address] 98721-
9294  18 May, 2017  Monilial rash B37.2

 

 [place] [address] [address] [id number] [place] [address] 91858-
7923  16 May, 2017   

 

 [place] [address] [id number] [place] [address] 55392-
4251  15 May, 2017   

 

 [place] [address] [address] [id number] [place] [address] 74022-
0765  11 May, 2017   

 

 [place] [address] [id number] [place] [address] 65538-
4211  11 May, 2017  Gastroesophageal reflux disease, esophagitis presence not 
specified K21.9

 

 [place] [address] [id number] [place] [address] 54666-
8928  09 May, 2017  Arthritis M19.90

 

 [place] [address] [place] [address] 08181-
7850  09 May, 2017  Anxiety F41.9 ; Arthritis M19.90 and Essential hypertension 
I10

 

 [place] [id number] [place] [address] 89106-
2883  05 May, 2017  Essential hypertension I10 and Anxiety F41.9

 

 [place] [address] [id number] [place] [address] 83777-
0899     

 

 [place] [id number] [place] [address] 35994-
3223    Arthritis M19.90 and Anxiety F41.9

 

 [place] [address] [id number] [place] [address] 32955-
9782    Type 2 diabetes mellitus without complication, without long-
term current use of insulin E11.9 ; Cellulitis of right lower extremity L03.115 
and Arthritis M19.90

 

 [place] [address] [id number] [place] [address] 75022-
5609  28 Mar, 2017   

 

 [place] [address] [id number] [place] [address] 07503-
6381  20 Mar, 2017  Type 2 diabetes mellitus without complication, without long-
term current use of insulin E11.9 ; Bronchitis J40 and Arthritis M19.90

 

 [place] [address] [place] [address] 77405-
4995  16 Mar, 2017   

 

 [place] [address] [id number] [place] [address] 28885-
6007    Anxiety F41.9

 

 [facility]  [id number] [place] [address] 10299
-[phone]    Dysuria R30.0 ; Acute cystitis with hematuria N30.01 and 
Vaginal yeast infection B37.3

 

 [place] [address] [place] [address] 91566-
4266    Arthritis M19.90

 

 [facility]  [id number] [place] [address] 83899
-[phone]    Strep pharyngitis J02.0 and Sore throat J02.9

 

 [place] [address] 25937-
9784     

 

 [place] [address] 66643-
7931    Type 2 diabetes mellitus without complication, without long-
term current use of insulin E11.9

 

 [place] [address] 78214-
7689  14 Dec, 2016   

 

 [place] [address] 13945-
3141  13 Dec, 2016  Seasonal allergic rhinitis due to pollen J30.1

 

 [facility]  [address] [address]
-[phone]  09 Dec, 2016  Impacted cerumen of right ear H61.21 and Seasonal allergic 
rhinitis due to pollen J30.1

 

 [place] [address] 81941-
2017  09 Dec, 2016   

 

 [place] [address] 31484-
5074  05 Dec, 2016   

 

 [place] [address] [place] [id number] [place] [address] 46170-
3831    Type 2 diabetes mellitus without complication, without long-
term current use of insulin E11.9 ; Anxiety F41.9 ; Essential hypertension I10 
and Encounter for immunization Z23

 

 [place] [address] 45343-
6126    Essential hypertension I10

 

 [place] [address] 00928-
3719     

 

 [place] [address] 10662-
4421     

 

 [place] [address] 89766-
1373    Essential hypertension I10

 

 [facility]  [address] [id number] [address]-
0064     

 

 [facility]  [id number] 818188-
0312  23 Sep, 2016   

 

 [facility]  [address] [address]-
2799  01 Sep, 2016   

 

 [facility]  [address] [address]258-
4470  30 Aug, 2016   

 

 [facility]  [address] 108388-
6045  24 Aug, 2016   

 

 [facility]  [address] [place] [address]-
1805  22 Aug, 2016   

 

 [facility]  [address] [place] [address]-
6559  22 Aug, 2016  Type 2 diabetes mellitus without complication, without long-
term current use of insulin E11.9 ; Essential hypertension I10 ; Acute non-
recurrent maxillary sinusitis J01.00 ; Arthritis M19.90 ; Lumbago with sciatica
, left side M54.42 ; Other chronic pain G89.29 and Anxiety F41.9







IMMUNIZATIONS

No Known Immunizations



SOCIAL HISTORY

Never Assessed



REASON FOR VISIT

medication question



PLAN OF CARE





VITAL SIGNS





MEDICATIONS







 Medication  Instructions  Dosage  Frequency  Start Date  End Date  Duration  
Status

 

 Omeprazole 40 MG  Orally Once a day  1 capsule  24h        30  Active







RESULTS

No Results



PROCEDURES

No Known procedures



INSTRUCTIONS





MEDICATIONS ADMINISTERED

No Known Medications



MEDICAL (GENERAL) HISTORY







 Type  Description  Date

 

 Medical History  type II diabetes   

 

 Medical History  hypertension   

 

 Medical History  Arthritis   

 

 Medical History  skin cancer-scalp   

 

 Surgical History  hysterectomy   

 

 Surgical History  skin cancer removal-scalp   

 

 Hospitalization History  Surgery(s) only   

 

 Hospitalization History  dehydration  2017

 

 Hospitalization History  bronchitis  2107

## 2019-02-08 NOTE — XMS REPORT
[facility]

 Created on: 2018



Richi Sandrita

External Reference #: [id number]

: 1934

Sex: Female



Demographics







 Address  [address] [place]  [address]

 

 Preferred Language  Unknown

 

 Marital Status  Unknown

 

 Gnosticism Affiliation  Unknown

 

 Race  Unknown

 

 Ethnic Group  Unknown





Author







 Author  YULISA RANGEL

 

 [facility]

 

 Address  [address] [place]  [address]



 

 Phone  [phone]







Care Team Providers







 Care Team Member Name  Role  Phone

 

 YULISA RANGEL  Unavailable  [phone]







PROBLEMS







 Type  Condition  ICD9-CM Code  FRP71-SR Code  Onset Dates  Condition Status  
SNOMED Code

 

 Problem  Essential hypertension     I10     Active  10892900

 

 Problem  Type 2 diabetes mellitus without complication, without long-term 
current use of insulin     E11.9     Active  884801097

 

 Problem  Anxiety     F41.9     Active  62389968

 

 Problem  Lumbago with sciatica, left side     M54.42     Active  278453263

 

 Problem  Arthritis     M19.90     Active  1115821

 

 Problem  Iron deficiency anemia, unspecified iron deficiency anemia type     
D50.9     Active  45529554

 

 Problem  Venous insufficiency     I87.2     Active  24977799

 

 Problem  Gastroesophageal reflux disease, esophagitis presence not specified  
   K21.9     Active  813151315

 

 Problem  Seasonal allergic rhinitis due to pollen     J30.1     Active  
44850542

 

 Problem  Anemia of chronic disease     D63.8     Active  487177207

 

 Problem  Chronic kidney disease, stage 3 (moderate)     N18.3     Active  
210583145







ALLERGIES

No Information



ENCOUNTERS







 Encounter  Location  Date  Diagnosis

 

 [place] [address], [place] [address]  08 Mar, 2018  Arthritis M19.90

 

 [address] [place] [address]-
[address]     

 

 [address] [place] [address]-
9405    Type 2 diabetes mellitus without complication, without long-
term current use of insulin E11.9 ; Lumbago with sciatica, left side M54.42 ; 
Arthritis M19.90 ; Iron deficiency anemia, unspecified iron deficiency anemia 
type D50.9 and BMI 40.0-44.9, adult Z68.41

 

 [place] [id number] [place] [address]-
0895     

 

 [facility], [place]  [address] [place] [id number] [place] [address]-
1619     

 

 [facility], [place]  [id number] [place] [address] 69495-
1775    Arthritis M19.90 and Anxiety F41.9

 

 [facility], [place]  [address] [address]-
8[address]     

 

 [facility] [place]  [address] [place] [address] [id number] [place] [address]27-
7455  18 Dec, 2017  Anxiety F41.9

 

 [facility] [place]  [id number] [place] [address] 32571-
6758     

 

 [facility] [place]  [id number] [place] [address] 31696-
3206     

 

 [facility] [place]  [address] [place] [id number] [place] [address]644-
5617    Vaginal yeast infection B37.3

 

 [facility] [place]  [id number] [place] [address] 84538-
2490    Anxiety F41.9

 

 [facility] [place]  [address] [place] [id number] [place] [address] 26702-
3920    Type 2 diabetes mellitus without complication, without long-
term current use of insulin E11.9

 

 [facility] [place]  [address] [place] [address] 48593-
2475  08 2017   

 

 [facility] [place]  [id number] [place] [address] 20230-
4845     

 

 [facility] [place]  [id number] [place] [address] 40417-
3899  24 Oct, 2017  Arthritis M19.90

 

 [facility] [place]  [id number] [place] [address] 26062-
4621  16 Oct, 2017   

 

 [facility] [place]  [id number] [place] [address] 70560-
4191  04 Oct, 2017   

 

 [place] [address] [place] [id number] [place] [address] 03406-
0350  05 Sep, 2017   

 

 [place] [address] [place] [id number] [place] [address] 00145-
0178  25 Aug, 2017  Venous insufficiency I87.2 and Venous stasis dermatitis of 
right lower extremity I87.2

 

 [place] [address] [place] [id number] [place] [address]70-
4710  21 Aug, 2017  Essential hypertension I10

 

 [address] [address]-
6982     

 

 [place] [address] [address]-
9824    Type 2 diabetes mellitus without complication, without long-
term current use of insulin E11.9 and Essential hypertension I10

 

 [place] [address] [place] [id number] [place] [address] 96719-
8389    Essential hypertension I10 and Type 2 diabetes mellitus 
without complication, without long-term current use of insulin E11.9

 

 [place] [id number] [place] [address] 29361-
1045    Chronic kidney disease, stage 3 (moderate) N18.3 ; Anemia 
of chronic disease D63.8 and Type 2 diabetes mellitus without complication, 
without long-term current use of insulin E11.9

 

 [place] [address] [place] [id number] [place] [address]-
9423    Type 2 diabetes mellitus without complication, without long-
term current use of insulin E11.9 ; Iron deficiency anemia secondary to 
inadequate dietary iron intake D50.8 ; Arthritis M19.90 and Lumbago with 
sciatica, left side M54.42

 

 [place] [address] [place] [address] 35994-
4768    Arthritis M19.90 and Anxiety F41.9

 

 [place] [address] [place] [address] 73907-
8716    Type 2 diabetes mellitus without complication, without long-
term current use of insulin E11.9 and Essential hypertension I10

 

 [place] [address] [place] [id number] [place] [address] 70338-
2096  22 May, 2017  Anxiety F41.9

 

 [place] [address] [place] [id number] [place] [address] 62790-
3235  18 May, 2017  Monilial rash B37.2

 

 [facility] [place]  [address] [place] [id number] [place] [address] 91103-
0773  16 May, 2017   

 

 [place] [address] [address]-
5253  15 May, 2017   

 

 [place] [address] [place] [id number] [place] [address] 97184-
1241  11 May, 2017   

 

 [place] [address] [address]-
7430  11 May, 2017  Gastroesophageal reflux disease, esophagitis presence not 
specified K21.9

 

 [place] [address] [place] [id number] [place] [address] 62247-
4047  09 May, 2017  Arthritis M19.90

 

 [place] [address] [place] [id number] [place] [address] 87344-
7146  09 May, 2017  Anxiety F41.9 ; Arthritis M19.90 and Essential hypertension 
I10

 

 [place] [address] [place] [id number] [place] [address] 30935-
2469  05 May, 2017  Essential hypertension I10 and Anxiety F41.9

 

 [place] [address] [place] [id number] [place] [address] 02520-
0942     

 

 [place] [address] [place] [id number] [place] [address] 42483-
5574    Arthritis M19.90 and Anxiety F41.9

 

 [place] [address] [place] [id number] [place] [address] 03171-
2797    Type 2 diabetes mellitus without complication, without long-
term current use of insulin E11.9 ; Cellulitis of right lower extremity L03.115 
and Arthritis M19.90

 

 [place] [address] [place] [id number] [place] [address] 12340-
2800  28 Mar, 2017   

 

 [place] [address] [place] [id number] [place] [address]-
1945  20 Mar, 2017  Type 2 diabetes mellitus without complication, without long-
term current use of insulin E11.9 ; Bronchitis J40 and Arthritis M19.90

 

 [place] [address] [place] [id number] [place] [address] 69933-
1400  16 Mar, 2017   

 

 [place] [address] [address]-
3950  24 2017  Anxiety F41.9

 

 [facility] [place] [address] [place] [id number] [place] [address] 76962
-[phone]    Dysuria R30.0 ; Acute cystitis with hematuria N30.01 and 
Vaginal yeast infection B37.3

 

 [place] [address] [place] [id number] [place] [address] 44645-
1600    Arthritis M19.90

 

 [facility] [place] [address] [place] [address] [id number] [place] [address] 88416
-[phone]    Strep pharyngitis J02.0 and Sore throat J02.9

 

 [place] [address] [place] [id number] [place] [address] 08007-
0049     

 

 [place] [address] [place] [id number] [place] [address] 38030-
5848    Type 2 diabetes mellitus without complication, without long-
term current use of insulin E11.9

 

 [place] [address] [place] [id number] [place] [address] 50539-
9804  14 Dec, 2016   

 

 [place] [address] [place] [id number] [place] [address] 75930-
7187  13 Dec, 2016  Seasonal allergic rhinitis due to pollen J30.1

 

 [facility]  [address] [place] [address] [place] [address] 33170
-[phone]  09 Dec, 2016  Impacted cerumen of right ear H61.21 and Seasonal allergic 
rhinitis due to pollen J30.1

 

 [facility] [place]  [address] [place] [address] [id number] [place] [address] 64885-
2610  09 Dec, 2016   

 

 [facility] [place]  [address] [place] [id number] [place] [address] 47776-
0416  05 Dec, 2016   

 

 [facility] [place]  [address] [place] [id number] [place] [address] 71012-
4797    Type 2 diabetes mellitus without complication, without long-
term current use of insulin E11.9 ; Anxiety F41.9 ; Essential hypertension I10 
and Encounter for immunization Z23

 

 [facility] [place]  [address] [place] [id number] [place] [address] 86169-
6739    Essential hypertension I10

 

 [place] [address] [address]-
7565     

 

 [facility] [place]  [address] [place] [id number] [place] [address] 12903-
7982     

 

 [facility] [place]  [address] [place] [id number] [place] [address] 36103-
3230    Essential hypertension I10

 

 [facility] [place]  [address] [place] [id number] [place] [address] 64885-
5026     

 

 [facility] [place]  [address] [place] [id number] [place] [address] 81841-
6325  23 Sep, 2016   

 

 [facility] [place]  [address] [place] [id number] [place] [address] 15881-
9504  01 Sep, 2016   

 

 [facility] [place]  [address] [place] [address] [id number] [place] [address] 52021-
4092  30 Aug, 2016   

 

 [facility] [place]  [id number] [place] [address] 20517-
0068  24 Aug, 2016   

 

 [facility] [place]  [address] [place] [id number] [place] [address] 46455-
9923  22 Aug, 2016   

 

 [facility] [place]  [address] [facility] [id number] [place] [address]-
9930  22 Aug, 2016  Type 2 diabetes mellitus without complication, without long-
term current use of insulin E11.9 ; Essential hypertension I10 ; Acute non-
recurrent maxillary sinusitis J01.00 ; Arthritis M19.90 ; Lumbago with sciatica
, left side M54.42 ; Other chronic pain G89.29 and Anxiety F41.9







IMMUNIZATIONS

No Known Immunizations



SOCIAL HISTORY

Never Assessed



REASON FOR VISIT

90 day supply



PLAN OF CARE





VITAL SIGNS





MEDICATIONS







 Medication  Instructions  Dosage  Frequency  Start Date  End Date  Duration  
Status

 

 Metformin HCl 500 mg  Orally 2 times a day  1 tablet  12h        90 days  
Active

 

 Lisinopril 5 mg  Orally Once a day  1 tablet  24h           Active







RESULTS

No Results



PROCEDURES

No Known procedures



INSTRUCTIONS





MEDICATIONS ADMINISTERED

No Known Medications



MEDICAL (GENERAL) HISTORY







 Type  Description  Date

 

 Medical History  type II diabetes   

 

 Medical History  hypertension   

 

 Medical History  Arthritis   

 

 Medical History  skin cancer-scalp   

 

 Surgical History  hysterectomy   

 

 Surgical History  skin cancer removal-scalp   

 

 Hospitalization History  Surgery(s) only   

 

 Hospitalization History  dehydration  2017

 

 Hospitalization History  bronchitis  2107

## 2019-02-08 NOTE — XMS REPORT
[facility]

 Created on: 2016



StoddardVladimirna

External Reference #: [id number]

: 1934

Sex: Female



Demographics







 Address  [address] [place]  [address]

 

 Home Phone  [phone]

 

 Preferred Language  Unknown

 

 Marital Status  Unknown

 

 Alevism Affiliation  Unknown

 

 Race   or 

 

 Ethnic Group  Not  or 





Author







 Author  YULISA RANGEL

 

 Organization  [employer]

 

 Address  Unknown

 

 Phone  Unavailable







Care Team Providers







 Care Team Member Name  Role  Phone

 

 YULISA RANGEL    Unavailable



                                                                



Allergies

          No Known Allergies                                                   
                                     



Problems

          





 Problem Type  Condition  Code  Onset Dates  Condition Status

 

 Problem  Essential hypertension  I10     Active

 

 Problem  Arthritis  M19.90     Active

 

 Problem  Type 2 diabetes mellitus without complication, without long-term 
current use of insulin  E11.9     Active

 

 Problem  Lumbago with sciatica, left side  M54.42     Active

 

 Problem  Anxiety  F41.9     Active



                                                                               
                                                 



Medications

          





 Medication  Code System  Code  Instructions  Start Date  End Date  Status  
Dosage

 

 Metformin HCl  NDC  [address]-50  500 MG Orally Twice a day           1 
tablet with meals



                                                                              



Results

          No Known Results                                                     
               



Summary Purpose

          [employer] Submission

## 2019-02-08 NOTE — XMS REPORT
[facility]

 Created on: 2017



Sandrita Stoddard

External Reference #: [id number]

: 1934

Sex: Female



Demographics







 Address  [address] [place]  [address]

 

 Preferred Language  Unknown

 

 Marital Status  Unknown

 

 Latter-day Affiliation  Unknown

 

 Race  Unknown

 

 Ethnic Group  Unknown





Author







 Author  TAMI LUGO

 

 [facility]

 

 Address  [address] [place]  [address]



 

 Phone  [phone]







Care Team Providers







 Care Team Member Name  Role  Phone

 

 TAMI LUGO  Unavailable  [phone]







PROBLEMS







 Type  Condition  ICD9-CM Code  TWJ94-MG Code  Onset Dates  Condition Status  
SNOMED Code

 

 Assessment  Impacted cerumen of right ear     H61.21  09 Dec, 2016  Active  
47938384

 

 Assessment  Seasonal allergic rhinitis due to pollen     J30.1  09 Dec, 2016  
Active  08108798

 

 Problem  Seasonal allergic rhinitis due to pollen     J30.1     Active  
26915023

 

 Problem  Type 2 diabetes mellitus without complication, without long-term 
current use of insulin     E11.9     Active  718967995

 

 Problem  Lumbago with sciatica, left side     M54.42     Active  040863172

 

 Problem  Anxiety     F41.9     Active  12595317

 

 Problem  Essential hypertension     I10     Active  84425280

 

 Problem  Arthritis     M19.90     Active  9415952







ALLERGIES







 Substance  Reaction  Event Type  Date  Status

 

 N.K.D.A.  Unknown  Non Drug Allergy  09 Dec, 2016  Unknown







SOCIAL HISTORY

No smoking Hx information available



PLAN OF CARE





VITAL SIGNS







 Height  56 in  2016

 

 Weight  178.6 lbs  2016

 

 Heart Rate  74 bpm  2016

 

 Respiratory Rate  22   2016

 

 BMI  40.04 kg/m2  2016

 

 Blood pressure systolic  134 mmHg  2016

 

 Blood pressure diastolic  66 mmHg  2016







MEDICATIONS







 Medication  Instructions  Dosage  Frequency  Start Date  End Date  Duration  
Status

 

 Metformin HCl 500  Orally Twice a day  1 tablet with meals  12h        30  
Active

 

 Aspir-81 81 MG  Orally Once a day  1 tablet  24h           Active

 

 Metoprolol Tartrate 25 MG  Orally Twice a day  1 tablet with food  12h        
   Active

 

 Lisinopril 5 mg  Orally Once a day  1 tablet  24h           Active

 

 Timolol 0.5 %  Ophthalmic Twice a day  1 drop into affected eye  12h           
Active

 

 Tramadol-Acetaminophen 37.5-325 MG  Orally every 4 hrs  2 tablets as needed  
4h           Active

 

 Cetirizine HCl 10 MG  Orally Once a day  1 tablet  24h  09 Dec, 2016  8 Shaun, 
2017  30 day(s)  Active

 

 Acetaminophen 500 MG  Orally every 6 hrs  2 capsules as needed  6h           
Active

 

 ProAir  (90 Base) MCG/ACT  Inhalation every 4 hrs  2 puffs as needed  
4h           Active

 

 Glimepiride 4 MG  Orally Once a day  1 tablet with breakfast or the first main 
meal of the day  24h           Active

 

 Xanax 0.5     TAKE ONE TABLET BY MOUTH THREE TIMES A DAY AS NEEDED FOR ANXIETY
           30  Active

 

 Hydrochlorothiazide 25 MG  Orally Once a day  1 tablet  24h           Active

 

 Metformin HCl 500 MG  Orally Twice a day  1 tablet with meals  12h           
Active

 

 Alprazolam 0.5 MG  Orally Three times a day  1 tablet  8h           Active







RESULTS

No Results



PROCEDURES







 Procedure  Date Ordered  Related Diagnosis  Body Site

 

 EAR LAVAGE  2016  N/A   

 

 EAR IRRIGATION  Dec 09, 2016      

 

 Office Visit, Est Pt., Level 3  Dec 09, 2016      

 

 [facility] VISIT ESTABLISHED PATIENT  Dec 09, 2016      







IMMUNIZATIONS

No Known Immunizations

## 2019-02-08 NOTE — XMS REPORT
[facility]

 Created on: 2016



Sandrita Stoddard

External Reference #: [id number]

: 1934

Sex: Female



Demographics







 Address  [address] [place]  [address]

 

 Home Phone  [phone]

 

 Preferred Language  Unknown

 

 Marital Status  Unknown

 

 [employer] Affiliation  Unknown

 

 Race   or 

 

 Ethnic Group  Not  or 





Author







 Author  YULISA RANGEL

 

 Organization  [employer]

 

 Address  Unknown

 

 Phone  Unavailable







Care Team Providers







 Care Team Member Name  Role  Phone

 

 YULISA RANGEL    Unavailable



                                                                



Allergies

          No Known Allergies                                                   
                                     



Problems

          





 Problem Type  Condition  Code  Onset Dates  Condition Status

 

 Problem  Essential hypertension  I10     Active

 

 Problem  Arthritis  M19.90     Active

 

 Problem  Type 2 diabetes mellitus without complication, without long-term 
current use of insulin  E11.9     Active

 

 Assessment  Essential hypertension  I10     Active

 

 Problem  Lumbago with sciatica, left side  M54.42     Active

 

 Problem  Anxiety  F41.9     Active



                                                                               
                                                           



Medications

          





 Medication  Code System  Code  Instructions  Start Date  End Date  Status  
Dosage

 

 Lisinopril  NDC  [address]-25  5 mg Orally Once a day           1 tablet

 

 Metoprolol Tartrate  NDC  [address]-45  25 MG Orally Twice a day           1 
tablet with food



                                                                               
         



Results

          No Known Results                                                     
               



Summary Purpose

          [employer] Submission

## 2019-02-08 NOTE — XMS REPORT
[facility]

 Created on: 2018



Richi Sandrita

External Reference #: [id number]

: 1934

Sex: Female



Demographics







 Address  [address] [place]  [address]

 

 Preferred Language  Unknown

 

 Marital Status  Unknown

 

 Hindu Affiliation  Unknown

 

 Race  Unknown

 

 Ethnic Group  Unknown





Author







 Author  STIVEN HECTOR

 

 [facility] [place] [facility]

 

 Address  [address] [place]  [address]



 

 Phone  [phone]







Care Team Providers







 Care Team Member Name  Role  Phone

 

 STIVEN HECTOR  Unavailable  [phone]







PROBLEMS







 Type  Condition  ICD9-CM Code  HHB65-NX Code  Onset Dates  Condition Status  
SNOMED Code

 

 Problem  Essential hypertension     I10     Active  12810961

 

 Problem  Type 2 diabetes mellitus without complication, without long-term 
current use of insulin     E11.9     Active  237447503

 

 Problem  Anxiety     F41.9     Active  23985528

 

 Problem  Lumbago with sciatica, left side     M54.42     Active  701274812

 

 Problem  Arthritis     M19.90     Active  8584148

 

 Problem  Iron deficiency anemia, unspecified iron deficiency anemia type     
D50.9     Active  78258961

 

 Problem  Venous insufficiency     I87.2     Active  56230634

 

 Problem  Gastroesophageal reflux disease, esophagitis presence not specified  
   K21.9     Active  455577286

 

 Problem  Seasonal allergic rhinitis due to pollen     J30.1     Active  
33027510

 

 Problem  Anemia of chronic disease     D63.8     Active  593016515

 

 Problem  Chronic kidney disease, stage 3 (moderate)     N18.3     Active  
238921553







ALLERGIES

No Known Allergies



ENCOUNTERS







 Encounter  Location  Date  Diagnosis

 

 [place] [address] [place] [address] 41488-
3487    Essential hypertension I10

 

 [place] [address] [place] [id number] [place] [address] 41261-
2300    Type 2 diabetes mellitus without complication, without long-
term current use of insulin E11.9 ; Essential hypertension I10 and Anemia of 
chronic disease D63.8

 

 [place] [address] [place] [id number] [place] [address] 78225-
9752    Arthritis M19.90 and Essential hypertension I10

 

 [place] [address] [place] [id number] [place] [address] 22120-
9377  15 May, 2018   

 

 [facility]  [address] [place] [id number] [place] [address] 67890-
8860  15 May, 2018   

 

 [place] [address] [address]-
8587  15 May, 2018  Medicare annual wellness visit, initial Z00.00 ; Type 2 
diabetes mellitus without complication, without long-term current use of 
insulin E11.9 ; Chronic kidney disease, stage 3 (moderate) N18.3 ; Essential 
hypertension I10 ; Gastroesophageal reflux disease, esophagitis presence not 
specified K21.9 ; Anxiety F41.9 ; Arthritis M19.90 and Encounter for 
immunization Z23

 

 [place] [address] [place] [id number] [place] [address] 96889-
0905  03 May, 2018  Essential hypertension I10

 

 [place] [address] [place] [id number] [place] [address] 83640-
3249    Arthritis M19.90

 

 [place] [address] [place] [id number] [place] [address] 30745-
3313     

 

 [facility]  [address] [place] [id number] [place] [address] 14741-
1225     

 

 [place] [address] [place] [id number] [place] [address] 78664-
2234    Essential hypertension I10

 

 [facility] [place] [facility]  [address] [place] [id number] [place] [address] 49086
[phone]  27 Mar, 2018  Dysuria R30.0 and Vaginal candida B37.3

 

 [facility]  [address] [place] [id number] [place] [address] 77228-
2775  08 Mar, 2018  Arthritis M19.90

 

 [facility]  [address] [place] [id number] [place] [address] 15494-
0849     

 

 [place] [address] [place] [id number] [place] [address] 97368-
8123    Type 2 diabetes mellitus without complication, without long-
term current use of insulin E11.9 ; Lumbago with sciatica, left side M54.42 ; 
Arthritis M19.90 ; Iron deficiency anemia, unspecified iron deficiency anemia 
type D50.9 and BMI 40.0-44.9, adult Z68.41

 

 [facility]  [address] [address]-
2001     

 

 [facility]  [address] 88711-
0536     

 

 [facility]  [address] 71828-
5392    Arthritis M19.90 and Anxiety F41.9

 

 [place] [address] 16561-
8866     

 

 [place] [address] 87281-
8002  18 Dec, 2017  Anxiety F41.9

 

 [place] [address] 62856-
4126     

 

 [facility]  [address] 10984-
3837     

 

 [place] [address] 08342-
8051    Vaginal yeast infection B37.3

 

 [place] [address] 20113-
3077    Anxiety F41.9

 

 [place] [address] [place] [id number] [place] [address] 01221-
9930    Type 2 diabetes mellitus without complication, without long-
term current use of insulin E11.9

 

 [place] [address] 68689-
9831  08 2017   

 

 [place] [address] 04978-
7524     

 

 [facility]  [address] [place] [id number] [place] [address] 74414-
8297  24 Oct, 2017  Arthritis M19.90

 

 [place] [address] [place] [address] 56834-
6039  16 Oct, 2017   

 

 [place] [address] [place] [id number] [place] [address] 18242-
1423  04 Oct, 2017   

 

 [place] [address] [place] [id number] [place] [address] 50629-
5176  05 Sep, 2017   

 

 [place] [address] 36765-
3107  25 Aug, 2017  Venous insufficiency I87.2 and Venous stasis dermatitis of 
right lower extremity I87.2

 

 [place] [address] [place] [id number] [place] [address] 90484-
3899  21 Aug, 2017  Essential hypertension I10

 

 [place] [address] [place] [id number] [place] [address] 46306-
7703     

 

 [place] [address] [place] [id number] [place] [address] 15817-
9362    Type 2 diabetes mellitus without complication, without long-
term current use of insulin E11.9 and Essential hypertension I10

 

 [place] [address] [place] [id number] [place] [address] 80809-
6569    Essential hypertension I10 and Type 2 diabetes mellitus 
without complication, without long-term current use of insulin E11.9

 

 [place] [address] [place] [id number] [place] [address] 65159-
8634    Chronic kidney disease, stage 3 (moderate) N18.3 ; Anemia 
of chronic disease D63.8 and Type 2 diabetes mellitus without complication, 
without long-term current use of insulin E11.9

 

 [place] [address] [place] [id number] [place] [address] 27234-
8145  14 2017  Type 2 diabetes mellitus without complication, without long-
term current use of insulin E11.9 ; Iron deficiency anemia secondary to 
inadequate dietary iron intake D50.8 ; Arthritis M19.90 and Lumbago with 
sciatica, left side M54.42

 

 [place] [address] [place] [id number] [place] [address] 46356-
4754    Arthritis M19.90 and Anxiety F41.9

 

 [facility]  [address] [place] [id number] [place] [address] 50921-
5672    Type 2 diabetes mellitus without complication, without long-
term current use of insulin E11.9 and Essential hypertension I10

 

 [facility]  [address] [place] [id number] [place] [address] 36023-
0786  22 May, 2017  Anxiety F41.9

 

 [facility]  [address] 27887-
6864  18 May, 2017  Monilial rash B37.2

 

 [facility]  [address] 76086-
3242  16 May, 2017   

 

 [facility]  [address] [place] [id number] [place] [address] 14883-
2614  15 May, 2017   

 

 [facility]  [address] 01866-
6073  11 May, 2017   

 

 [facility]  [address] [place] [id number] [place] [address] 17952-
4485  11 May, 2017  Gastroesophageal reflux disease, esophagitis presence not 
specified K21.9

 

 [facility]  [address] [place] [id number] [place] [address] 76958-
7090  09 May, 2017  Arthritis M19.90

 

 [facility]  [address] [place] [id number] [place] [address] 39732-
5005  09 May, 2017  Anxiety F41.9 ; Arthritis M19.90 and Essential hypertension 
I10

 

 [facility]  [address] [place] [id number] [place] [address] 59570-
2326  05 May, 2017  Essential hypertension I10 and Anxiety F41.9

 

 [facility]  [address] [place] [id number] [place] [address] 31574-
3623     

 

 [facility]  [address] [place] [id number] [place] [address] 74362-
4332    Arthritis M19.90 and Anxiety F41.9

 

 [facility]  [address] [place] [address] 23323-
0157  11 2017  Type 2 diabetes mellitus without complication, without long-
term current use of insulin E11.9 ; Cellulitis of right lower extremity L03.115 
and Arthritis M19.90

 

 [place] [address] [place] [id number] [place] [address] 59341-
7749  28 Mar, 2017   

 

 [place] [address] 6[address]17-
1866  20 Mar, 2017  Type 2 diabetes mellitus without complication, without long-
term current use of insulin E11.9 ; Bronchitis J40 and Arthritis M19.90

 

 [place] [address] 50831-
5096  16 Mar, 2017   

 

 [place] [address] [place] [id number] [place] [address] 46943-
4427  24 2017  Anxiety F41.9

 

 [facility]ALK IN [facility]  [address] [place] [id number] [place] [address] 52864
-[phone]    Dysuria R30.0 ; Acute cystitis with hematuria N30.01 and 
Vaginal yeast infection B37.3

 

 [place] [address] [place] [id number] [place] [address] 76803-
7106    Arthritis M19.90

 

 [facility]  [address] [place] [id number] [place] [address] 83978
[phone]    Strep pharyngitis J02.0 and Sore throat J02.9

 

 [place] [address] [place] [id number] [place] [address] 66786-
3127     

 

 [place] [address] [place] [id number] [place] [address] 52777-
6971    Type 2 diabetes mellitus without complication, without long-
term current use of insulin E11.9

 

 [place] [address] [place] [id number] [place] [address] 88640-
3299  14 Dec, 2016   

 

 [place] [address] 36255-
8640  13 Dec, 2016  Seasonal allergic rhinitis due to pollen J30.1

 

 [facility] IN [facility]  [address] 07692
-1354  09 Dec, 2016  Impacted cerumen of right ear H61.21 and Seasonal allergic 
rhinitis due to pollen J30.1

 

 [facility]  [address] 04185-
2223  09 Dec, 2016   

 

 [facility]  [address] 35781-
4965  05 Dec, 2016   

 

 [facility]  [address] 99725-
1316    Type 2 diabetes mellitus without complication, without long-
term current use of insulin E11.9 ; Anxiety F41.9 ; Essential hypertension I10 
and Encounter for immunization Z23

 

 [facility]  [address] 40309-
3303    Essential hypertension I10

 

 [facility]  [address] 87599-
0241     

 

 [facility]  [address] 09178-
7025     

 

 [facility]  [address] 37864-
5290    Essential hypertension I10

 

 [facility]  [address] 29877-
8303     

 

 [facility]  [address] 34067-
1804  23 Sep, 2016   

 

 [facility]  [address] 93012-
6868  01 Sep, 2016   

 

 [facility]  [address] 78819-
2172  30 Aug, 2016   

 

 [facility]  [address] 13850-
3762  24 Aug, 2016   

 

 [facility]  [address] [facility] [id number] [place] [address]-
8748  22 Aug, 2016   

 

 [facility]  [address] [facility] [id number] [place] [address]-
1099  22 Aug, 2016  Type 2 diabetes mellitus without complication, without long-
term current use of insulin E11.9 ; Essential hypertension I10 ; Acute non-
recurrent maxillary sinusitis J01.00 ; Arthritis M19.90 ; Lumbago with sciatica
, left side M54.42 ; Other chronic pain G89.29 and Anxiety F41.9







IMMUNIZATIONS

No Known Immunizations



SOCIAL HISTORY

Never Assessed



REASON FOR VISIT

uti symptoms  Pt feels she has a yeast infection, has burning on the outside 
with urination, also c/o L side pain  RAISA Benz



PLAN OF CARE







 Activity  Details









  









 Follow Up  prn Reason:







VITAL SIGNS







 Height  56 in  2018

 

 Temperature  96.6 degrees Fahrenheit  2018

 

 Heart Rate  88 bpm  2018

 

 Respiratory Rate  22   2018

 

 Blood pressure systolic  124 mmHg  2018

 

 Blood pressure diastolic  60 mmHg  2018







MEDICATIONS







 Medication  Instructions  Dosage  Frequency  Start Date  End Date  Duration  
Status

 

 Glimepiride 4 MG  Orally Once a day  1 tablet with breakfast or the first main 
meal of the day  24h       90 days  Active

 

 ProAir  (90 Base) MCG/ACT  Inhalation every 4-6 hours as needed  2 
puffs as needed           30 days  Active

 

 Wheelchair -     use for ambulation  24h  09 May, 2017        Not-Taking

 

 Omeprazole 20 MG  Orally Once a day  1 capsule  24h       30 day(s
)  Active

 

 Wheelchair -     use for transportation  24h          Not-Taking

 

 Metoprolol Tartrate 25     TAKE ONE TABLET BY MOUTH TWICE A DAY           30  
Active

 

 Blood Glucose Test Strip Test Strips  subcutaneously Once a day  1 test strip  
24h          Active

 

 Monistat 1 Combo Pack 1200 & 2 MG & %  Vaginal Once a day  use insert and 
cream  24h          Not-Taking

 

 Diflucan 150 MG  Orally Take one tablet today and repeat in 72 hours  as 
directed     27 Mar, 2018  31 Mar, 2018  4 days  Active

 

 Timolol Hemihydrate 0.5 %  Ophthalmic Twice a day  1 drop into affected eye  
12h           Active

 

 Lisinopril 5 mg  Orally Once a day  1 tablet  24h           Active

 

 Omeprazole 20  Orally Once a day  1 capsule  24h        30  Active

 

 Wheelchair -     use for mobility             Active

 

 Tramadol-Acetaminophen 37.5-325 MG  Orally every 4 hrs  2 tablets as needed  
4h        28 days  Active

 

 Alprazolam 0.5 MG  Orally Three times a day  1 tablet  8h        30 days  
Active







RESULTS







 Name  Result  Date  Reference Range

 

 UA LONG DIP (IN HOUSE)     2018   

 

 Lot #  [id number]      

 

 Exp date  34259993      

 

 Clarity  clear      

 

 Color  yellow      

 

 Odor  none      

 

 GLU  negative      

 

 PACO  negative      

 

 KET  negative      

 

 SG  1.015      

 

 BLO  tr-intact      

 

 pH  5.5      

 

 Protein  trace      

 

 URO  0.2      

 

 NIT  negative      

 

 RUSSELL  negative      

 

 Lot #  [id number]      

 

 Exp date  420      







PROCEDURES







 Procedure  Date Ordered  Result  Body Site

 

 URINALYSIS, AUTO, W/O SCOPE  2018      

 

 [facility] VISIT ESTABLISHED PATIENT  2018      







INSTRUCTIONS





MEDICATIONS ADMINISTERED

No Known Medications



MEDICAL (GENERAL) HISTORY







 Type  Description  Date

 

 Medical History  type II diabetes   

 

 Medical History  hypertension   

 

 Medical History  Arthritis   

 

 Medical History  skin cancer-scalp   

 

 Surgical History  hysterectomy   

 

 Surgical History  skin cancer removal-scalp   

 

 Hospitalization History  Surgery(s) only   

 

 Hospitalization History  dehydration  2017

 

 Hospitalization History  bronchitis  2107

## 2019-02-08 NOTE — XMS REPORT
[facility]

 Created on: 04/10/2018



Richi Sandrita

External Reference #: [id number]

: 1934

Sex: Female



Demographics







 Address  [address] [place]  [address]

 

 Preferred Language  Unknown

 

 Marital Status  Unknown

 

 Mormonism Affiliation  Unknown

 

 Race  Unknown

 

 Ethnic Group  Unknown





Author







 Author  YULISA RANGEL

 

 [facility]

 

 Address  [address] [place]  [address]



 

 Phone  [phone]







Care Team Providers







 Care Team Member Name  Role  Phone

 

 YULISA RANGEL  Unavailable  [phone]







PROBLEMS







 Type  Condition  ICD9-CM Code  GFW57-TS Code  Onset Dates  Condition Status  
SNOMED Code

 

 Problem  Essential hypertension     I10     Active  40044979

 

 Problem  Type 2 diabetes mellitus without complication, without long-term 
current use of insulin     E11.9     Active  097587320

 

 Problem  Anxiety     F41.9     Active  67011380

 

 Problem  Lumbago with sciatica, left side     M54.42     Active  821771654

 

 Problem  Arthritis     M19.90     Active  5943732

 

 Problem  Iron deficiency anemia, unspecified iron deficiency anemia type     
D50.9     Active  54516255

 

 Problem  Venous insufficiency     I87.2     Active  57127995

 

 Problem  Gastroesophageal reflux disease, esophagitis presence not specified  
   K21.9     Active  088351728

 

 Problem  Seasonal allergic rhinitis due to pollen     J30.1     Active  
02371396

 

 Problem  Anemia of chronic disease     D63.8     Active  800105192

 

 Problem  Chronic kidney disease, stage 3 (moderate)     N18.3     Active  
[id number]







ALLERGIES

No Information



ENCOUNTERS







 Encounter  Location  Date  Diagnosis

 

 [facility]  [address] [place] [phone]-
[phone]    Essential hypertension I10

 

 [facility]  [address] [place] [address] [phone]
[phone]  27 Mar, 2018  Dysuria R30.0 and Vaginal candida B37.3

 

 [facility]  [id number] [place] [address] [phone]-
3990  08 Mar, 2018  Arthritis M19.90

 

 [facility]  [id number] [place] [address] 51746-
8184     

 

 [facility]  [id number] [place] [address] 60359-
8852    Type 2 diabetes mellitus without complication, without long-
term current use of insulin E11.9 ; Lumbago with sciatica, left side M54.42 ; 
Arthritis M19.90 ; Iron deficiency anemia, unspecified iron deficiency anemia 
type D50.9 and BMI 40.0-44.9, adult Z68.41

 

 [facility]  [id number] [place] [address] [phone]-
8286     

 

 [place] [address] [address]-
[phone]     

 

 [place] [address] 18165-
7920    Arthritis M19.90 and Anxiety F41.9

 

 [place] [address] [address]-
7701     

 

 [place] [address] [address]-
4899  18 Dec, 2017  Anxiety F41.9

 

 [place] [address] [address]-
0652     

 

 [place] [address] [address]-
9650     

 

 [place] [id number] [place] [address] 02157-
1613    Vaginal yeast infection B37.3

 

 [place] [address] 8[address]10-
0375    Anxiety F41.9

 

 [place] [id number] [place] [address] 95035-
7946    Type 2 diabetes mellitus without complication, without long-
term current use of insulin E11.9

 

 [place] [id number] [place] [address] 72101-
0259     

 

 [place] [address] [address]-
3999     

 

 [place] [address], [place] [address]-
6293  24 Oct, 2017  Arthritis M19.90

 

 [place] [id number] [place] [address] [phone]-
3645  16 Oct, 2017   

 

 [address] [address], [place] [address]-
6248  04 Oct, 2017   

 

 [place] [id number] [place] [address] 04562-
3444  05 Sep, 2017   

 

 [place] [id number] [place] [address] 25484-
4691  25 Aug, 2017  Venous insufficiency I87.2 and Venous stasis dermatitis of 
right lower extremity I87.2

 

 [place] [id number] [place] [address] 32582-
7555  21 Aug, 2017  Essential hypertension I10

 

 [place] [id number] [place] [address] [phone]-
[phone]     

 

 [place] [id number] [place] [address] 34915-
2488    Type 2 diabetes mellitus without complication, without long-
term current use of insulin E11.9 and Essential hypertension I10

 

 [place] [address] [address], [place] [address]-
1865    Essential hypertension I10 and Type 2 diabetes mellitus 
without complication, without long-term current use of insulin E11.9

 

 [place] [address], [place] [address]-
6493    Chronic kidney disease, stage 3 (moderate) N18.3 ; Anemia 
of chronic disease D63.8 and Type 2 diabetes mellitus without complication, 
without long-term current use of insulin E11.9

 

 [place] [address], [place] [address]-
8426    Type 2 diabetes mellitus without complication, without long-
term current use of insulin E11.9 ; Iron deficiency anemia secondary to 
inadequate dietary iron intake D50.8 ; Arthritis M19.90 and Lumbago with 
sciatica, left side M54.42

 

 [facility]  [id number] [place] [address] 85831-
3215    Arthritis M19.90 and Anxiety F41.9

 

 [facility]  [id number] [place] [address] 22010-
5315    Type 2 diabetes mellitus without complication, without long-
term current use of insulin E11.9 and Essential hypertension I10

 

 [facility]  [id number] [place] [address] 03529-
7093  22 May, 2017  Anxiety F41.9

 

 [place] [address] [address]-
2896  18 May, 2017  Monilial rash B37.2

 

 [place] [address] [address]-
9256  16 May, 2017   

 

 [facility]  [address] [address]-
9595  15 May, 2017   

 

 [facility]  [address] [address]-
1820  11 May, 2017   

 

 [facility]  [address] [address]-
0798  11 May, 2017  Gastroesophageal reflux disease, esophagitis presence not 
specified K21.9

 

 [facility]  [id number] [place] [address] 52827-
9404  09 May, 2017  Arthritis M19.90

 

 [facility]  [id number] [place] [address] 56103-
2496  09 May, 2017  Anxiety F41.9 ; Arthritis M19.90 and Essential hypertension 
I10

 

 [facility]  [address] [address]-
9210  05 May, 2017  Essential hypertension I10 and Anxiety F41.9

 

 [facility]  [id number] [place] [address] 98942-
9079     

 

 [place] [id number] [place] [address] 38485-
7830    Arthritis M19.90 and Anxiety F41.9

 

 [place] [address] [address]-
2116    Type 2 diabetes mellitus without complication, without long-
term current use of insulin E11.9 ; Cellulitis of right lower extremity L03.115 
and Arthritis M19.90

 

 [place] [address] 9[address]-
8423  28 Mar, 2017   

 

 [place] [address] [address]-
3026  20 Mar, 2017  Type 2 diabetes mellitus without complication, without long-
term current use of insulin E11.9 ; Bronchitis J40 and Arthritis M19.90

 

 [place] [id number] [place] [address] 06251-
6655  16 Mar, 2017   

 

 [place] [address] [address]-
6776    Anxiety F41.9

 

 [facility]  [address] [address]
[phone]    Dysuria R30.0 ; Acute cystitis with hematuria N30.01 and 
Vaginal yeast infection B37.3

 

 [id number] [place] [address] 52641-
6825    Arthritis M19.90

 

 [facility]  [id number] [place] [address] 38081
-[phone]    Strep pharyngitis J02.0 and Sore throat J02.9

 

 [place] [address] 7[address]-
8304     

 

 [place] [address] [address]309-
2447    Type 2 diabetes mellitus without complication, without long-
term current use of insulin E11.9

 

 [place] [id number] [place] [address] 99445-
0207  14 Dec, 2016   

 

 [facility]  [id number] [place] [address] 44089-
3542  13 Dec, 2016  Seasonal allergic rhinitis due to pollen J30.1

 

 [facility] [place] [facility]  [id number] [place] [address] 83155
-[phone]  09 Dec, 2016  Impacted cerumen of right ear H61.21 and Seasonal allergic 
rhinitis due to pollen J30.1

 

 [facility]  [id number] [place] [address] 49943-
0638  09 Dec, 2016   

 

 [facility]  [address] 7[address]53-
8969  05 Dec, 2016   

 

 [facility]  [id number] [place] [address] 94906-
2818    Type 2 diabetes mellitus without complication, without long-
term current use of insulin E11.9 ; Anxiety F41.9 ; Essential hypertension I10 
and Encounter for immunization Z23

 

 [facility]  [id number] [place] [address] 38346-
1534    Essential hypertension I10

 

 [place] [id number] [place] [address] 62264-
8998     

 

 [facility]  [id number] [place] [address] 33761-
1930     

 

 [facility]  [id number] [place] [address] 10192-
9950    Essential hypertension I10

 

 [facility]  [id number] [place] [address] 66584-
[phone]     

 

 [place] [address] [address]-
3382  23 Sep, 2016   

 

 [facility]  [id number] [place] [address] 29713-
6760  01 Sep, 2016   

 

 [facility]  [address] [place] [address]-
9863  30 Aug, 2016   

 

 [facility]  [address] [facility] [id number] [address]-
0543  24 Aug, 2016   

 

 [facility]  [address] [facility] [id number] [address]-
7431  22 Aug, 2016   

 

 [facility]  [address] [facility] [id number] [address]-
5839  22 Aug, 2016  Type 2 diabetes mellitus without complication, without long-
term current use of insulin E11.9 ; Essential hypertension I10 ; Acute non-
recurrent maxillary sinusitis J01.00 ; Arthritis M19.90 ; Lumbago with sciatica
, left side M54.42 ; Other chronic pain G89.29 and Anxiety F41.9







IMMUNIZATIONS

No Known Immunizations



SOCIAL HISTORY

Never Assessed



REASON FOR VISIT

90 Day supply



PLAN OF CARE





VITAL SIGNS





MEDICATIONS







 Medication  Instructions  Dosage  Frequency  Start Date  End Date  Duration  
Status

 

 Lisinopril 5 mg  Orally Once a day  1 tablet  24h           Active

 

 Glimepiride 2 MG  Orally Once a day  1 tablet with breakfast or the first main 
meal of the day  24h       90 days  Active







RESULTS

No Results



PROCEDURES

No Known procedures



INSTRUCTIONS





MEDICATIONS ADMINISTERED

No Known Medications



MEDICAL (GENERAL) HISTORY







 Type  Description  Date

 

 Medical History  type II diabetes   

 

 Medical History  hypertension   

 

 Medical History  Arthritis   

 

 Medical History  skin cancer-scalp   

 

 Surgical History  hysterectomy   

 

 Surgical History  skin cancer removal-scalp   

 

 Hospitalization History  Surgery(s) only   

 

 Hospitalization History  dehydration  2017

 

 Hospitalization History  bronchitis  2107

## 2019-02-08 NOTE — XMS REPORT
[facility]

 Created on: 2018



Sandrita Stoddard

External Reference #: [id number]

: 1934

Sex: Female



Demographics







 Address  [address] [place]  [address]

 

 Preferred Language  Unknown

 

 Marital Status  Unknown

 

 Confucianism Affiliation  Unknown

 

 Race  Unknown

 

 Ethnic Group  Unknown





Author







 Author  YULISA RANGEL

 

 Organization  [facility]

 

 Address  [address] [place]  [address]



 

 Phone  [phone]







Care Team Providers







 Care Team Member Name  Role  Phone

 

 YULISA RANGEL  Unavailable  [phone]







PROBLEMS







 Type  Condition  ICD9-CM Code  [id number] Code  Onset Dates  Condition Status  
SNOMED Code

 

 Problem  Essential hypertension     I10     Active  59918036

 

 Problem  Type 2 diabetes mellitus without complication, without long-term 
current use of insulin     E11.9     Active  316795444

 

 Problem  Anxiety     F41.9     Active  37546344

 

 Problem  Lumbago with sciatica, left side     M54.42     Active  468344559

 

 Problem  Arthritis     M19.90     Active  2640890

 

 Problem  Iron deficiency anemia, unspecified iron deficiency anemia type     
D50.9     Active  25163604

 

 Problem  Venous insufficiency     I87.2     Active  69171204

 

 Problem  Gastroesophageal reflux disease, esophagitis presence not specified  
   K21.9     Active  098556320

 

 Problem  Seasonal allergic rhinitis due to pollen     J30.1     Active  
16017573

 

 Problem  Anemia of chronic disease     D63.8     Active  439224002

 

 Problem  Chronic kidney disease, stage 3 (moderate)     N18.3     Active  
222055802







ALLERGIES

No Information



ENCOUNTERS







 Encounter  Location  Date  Diagnosis

 

 [place] [address] [address]-
[address]     

 

 [place] [address] [place] [id number] [place] [phone]-
[phone]  15 May, 2018   

 

 [place] [id number] [place] [address]-
3909  15 May, 2018   

 

 [place] [address] [place] [id number] [place] [address]-
3254  15 May, 2018  Medicare annual wellness visit, initial Z00.00 ; Type 2 
diabetes mellitus without complication, without long-term current use of 
insulin E11.9 ; Chronic kidney disease, stage 3 (moderate) N18.3 ; Essential 
hypertension I10 ; Gastroesophageal reflux disease, esophagitis presence not 
specified K21.9 ; Anxiety F41.9 ; Arthritis M19.90 and Encounter for 
immunization Z23

 

 [place] [address] [address]-
9118  03 May, 2018  Essential hypertension I10

 

 [place] [address] [address]-
7877    Arthritis M19.90

 

 [place] [address] [address]-
9672     

 

 [place] [address] [address]-
[address]     

 

 [place] [address] [address]-
5243    Essential hypertension I10

 

 [facility]  [address] [address]
[phone]  27 Mar, 2018  Dysuria R30.0 and Vaginal candida B37.3

 

 [place] [address] [address]-
7980  08 Mar, 2018  Arthritis M19.90

 

 [place] [address] [address]-
2967     

 

 [place] [address] [address]-
8259    Type 2 diabetes mellitus without complication, without long-
term current use of insulin E11.9 ; Lumbago with sciatica, left side M54.42 ; 
Arthritis M19.90 ; Iron deficiency anemia, unspecified iron deficiency anemia 
type D50.9 and BMI 40.0-44.9, adult Z68.41

 

 [place] [address] [address]-
2892     

 

 [place] [address] [address]-
1213     

 

 [place] [address] [address]-
6619    Arthritis M19.90 and Anxiety F41.9

 

 [facility]  [address] [address]-
4896     

 

 [facility]  [address] [place] [id number] [place] [address]-
4084  18 Dec, 2017  Anxiety F41.9

 

 [facility]  [address] [address]-
7855     

 

 [facility]  [address] [address]-
6865     

 

 [facility]  [address] [address]-
3033    Vaginal yeast infection B37.3

 

 [facility]  [address] [address]-
4153    Anxiety F41.9

 

 [facility]  [address] [address]-
6915    Type 2 diabetes mellitus without complication, without long-
term current use of insulin E11.9

 

 [facility]  [address] [place] [id number] [place] [address]-
8445     

 

 [facility]  [address] [place] [id number] [place] [address]-
9103     

 

 [facility]  [address] [place] [id number] [place] [address]-
5599  24 Oct, 2017  Arthritis M19.90

 

 [facility]  [address] [place] [id number] [place] [address]846-
0662  16 Oct, 2017   

 

 [facility]  [address] [place] [id number] [place] [address]-
7136  04 Oct, 2017   

 

 [facility]  [address] [place] [id number] [place] [address]91-
8067  05 Sep, 2017   

 

 [facility]  [address] [address]-
8778  25 Aug, 2017  Venous insufficiency I87.2 and Venous stasis dermatitis of 
right lower extremity I87.2

 

 [place] [address] [place] [id number] [place] [address]551-
9589  21 Aug, 2017  Essential hypertension I10

 

 [place] [address] [place] [id number] [place] [address] 17924-
0641     

 

 [place] [address] [place] [id number] [place] [address] 7[address]80-
1916    Type 2 diabetes mellitus without complication, without long-
term current use of insulin E11.9 and Essential hypertension I10

 

 [place] [id number] [place] [address]-
0369    Essential hypertension I10 and Type 2 diabetes mellitus 
without complication, without long-term current use of insulin E11.9

 

 [place] [id number] [place] [address] 12328-
5148    Chronic kidney disease, stage 3 (moderate) N18.3 ; Anemia 
of chronic disease D63.8 and Type 2 diabetes mellitus without complication, 
without long-term current use of insulin E11.9

 

 [place] [address] [place] [id number] [place] [address] 9[address]64-
3826    Type 2 diabetes mellitus without complication, without long-
term current use of insulin E11.9 ; Iron deficiency anemia secondary to 
inadequate dietary iron intake D50.8 ; Arthritis M19.90 and Lumbago with 
sciatica, left side M54.42

 

 [place] [id number] [place] [address] 29954-
8763    Arthritis M19.90 and Anxiety F41.9

 

 [place] [id number] [place] [address]193-
9485    Type 2 diabetes mellitus without complication, without long-
term current use of insulin E11.9 and Essential hypertension I10

 

 [place] [id number] [place] [address] 19334-
2467  22 May, 2017  Anxiety F41.9

 

 [facility]  [address] [address]-
7665  18 May, 2017  Monsoledad rash B37.2

 

 [facility]  [address] [place] [id number] [place] [address] 85947-
0767  16 May, 2017   

 

 [facility]  [address] [place] [id number] [place] [address] 01649-
7211  15 May, 2017   

 

 [facility]  [address] [place] [id number] [place] [address] 98004-
8762  11 May, 2017   

 

 [facility]  [address] [place] [id number] [place] [address]07-
9313  11 May, 2017  Gastroesophageal reflux disease, esophagitis presence not 
specified K21.9

 

 [facility]  [address] [place] [id number] [place] [address] 0[address]15-
2694  09 May, 2017  Arthritis M19.90

 

 [place] [address] [address]-
3967  09 May, 2017  Anxiety F41.9 ; Arthritis M19.90 and Essential hypertension 
I10

 

 [place] [address] [place] [id number] [place] [address] 86970-
3179  05 May, 2017  Essential hypertension I10 and Anxiety F41.9

 

 [facility]  [address] [place] [id number] [place] [address] 61328-
4451     

 

 [facility]  [address] [place] [id number] [place] [address] 85629-
1155    Arthritis M19.90 and Anxiety F41.9

 

 [facility]  [address] [place] [id number] [place] [address] 7[address]13-
4800    Type 2 diabetes mellitus without complication, without long-
term current use of insulin E11.9 ; Cellulitis of right lower extremity L03.115 
and Arthritis M19.90

 

 [facility]  [address] [place] [id number] [place] [address] 00148-
5490  28 Mar, 2017   

 

 [facility]  [address] [address]-
0950  20 Mar, 2017  Type 2 diabetes mellitus without complication, without long-
term current use of insulin E11.9 ; Bronchitis J40 and Arthritis M19.90

 

 [place] [address] [address]-
8282  16 Mar, 2017   

 

 [place] [address] [address]-
9375    Anxiety F41.9

 

 [facility] WALK IN [place] [address] [phone]    Dysuria R30.0 ; Acute cystitis with hematuria N30.01 and 
Vaginal yeast infection B37.3

 

 [place] [address] [address]-
2817    Arthritis M19.90

 

 [facility] WALK IN [place] [address] [phone]    Strep pharyngitis J02.0 and Sore throat J02.9

 

 [place] [address] [address]-
2566     

 

 [place] [address] [address]-
6877    Type 2 diabetes mellitus without complication, without long-
term current use of insulin E11.9

 

 [place] [address] [address]-
9250  14 Dec, 2016   

 

 [place] [address] [address]-
9091  13 Dec, 2016  Seasonal allergic rhinitis due to pollen J30.1

 

 [facility] WALK IN [address] [address]  09 Dec, 2016  Impacted cerumen of right ear H61.21 and Seasonal allergic 
rhinitis due to pollen J30.1

 

 [address] [address]-
7461  09 Dec, 2016   

 

 [facility]  [address] [place] [id number] [place] [address] 31146-
5443  05 Dec, 2016   

 

 [facility]  [address] [place] [id number] [place] [address] 59554-
9042    Type 2 diabetes mellitus without complication, without long-
term current use of insulin E11.9 ; Anxiety F41.9 ; Essential hypertension I10 
and Encounter for immunization Z23

 

 [facility]  [address] [address]-
7739    Essential hypertension I10

 

 [facility]  [address] [place] [id number] [place] [address] 26232-
4816     

 

 [facility]  [address] [place] [id number] [place] [address] 94253-
0066     

 

 [facility]  [address] [place] [id number] [place] [address] 80756-
5285    Essential hypertension I10

 

 [facility]  [address] [place] [id number] [place] [address] 19631-
7214     

 

 [facility]  [address] [place] [id number] [place] [address] 00098-
3874  23 Sep, 2016   

 

 [facility]  [address] [place] [id number] [place] [address] 62461-
1978  01 Sep, 2016   

 

 [facility]  [address] [place] [id number] [place] [address] 17979-
5327  30 Aug, 2016   

 

 [facility]  [address] [place] [id number] [place] [address] 58501-
8580  24 Aug, 2016   

 

 [facility]  [address] [place] [id number] [place] [address] 63451-
6355  22 Aug, 2016   

 

 [facility]  [address] [place] [id number] [place] [address] 6[address]75-
0215  22 Aug, 2016  Type 2 diabetes mellitus without complication, without long-
term current use of insulin E11.9 ; Essential hypertension I10 ; Acute non-
recurrent maxillary sinusitis J01.00 ; Arthritis M19.90 ; Lumbago with sciatica
, left side M54.42 ; Other chronic pain G89.29 and Anxiety F41.9







IMMUNIZATIONS

No Known Immunizations



SOCIAL HISTORY

Never Assessed



REASON FOR VISIT

Xanax 



PLAN OF CARE





VITAL SIGNS





MEDICATIONS







 Medication  Instructions  Dosage  Frequency  Start Date  End Date  Duration  
Status

 

 Alprazolam 0.5  Orally Three times a day  1 tablet  8h        30  Active







RESULTS

No Results



PROCEDURES

No Known procedures



INSTRUCTIONS





MEDICATIONS ADMINISTERED

No Known Medications



MEDICAL (GENERAL) HISTORY







 Type  Description  Date

 

 Medical History  type II diabetes   

 

 Medical History  hypertension   

 

 Medical History  Arthritis   

 

 Medical History  skin cancer-scalp   

 

 Surgical History  hysterectomy   

 

 Surgical History  skin cancer removal-scalp   

 

 Hospitalization History  Surgery(s) only   

 

 Hospitalization History  dehydration  2017

 

 Hospitalization History  bronchitis  2107

## 2019-02-08 NOTE — XMS REPORT
[facility]

 Created on: 2018



Richi Sandrita

External Reference #: [id number]

: 1934

Sex: Female



Demographics







 Address  [address] [place]  [address]

 

 Preferred Language  Unknown

 

 Marital Status  Unknown

 

 Yazidi Affiliation  Unknown

 

 Race  Unknown

 

 Ethnic Group  Unknown





Author







 Author  YULISA RANGEL

 

 [facility]

 

 Address  [address] [place]  [address]



 

 Phone  [phone]







Care Team Providers







 Care Team Member Name  Role  Phone

 

 YULISA RANGEL  Unavailable  [phone]







PROBLEMS







 Type  Condition  ICD9-CM Code  UQD78-ZH Code  Onset Dates  Condition Status  
SNOMED Code

 

 Problem  Essential hypertension     I10     Active  65418292

 

 Problem  Type 2 diabetes mellitus without complication, without long-term 
current use of insulin     E11.9     Active  167220815

 

 Problem  Anxiety     F41.9     Active  91235086

 

 Problem  Lumbago with sciatica, left side     M54.42     Active  731169585

 

 Problem  Arthritis     M19.90     Active  8541029

 

 Problem  Iron deficiency anemia, unspecified iron deficiency anemia type     
D50.9     Active  69841921

 

 Problem  Venous insufficiency     I87.2     Active  43359948

 

 Problem  Gastroesophageal reflux disease, esophagitis presence not specified  
   K21.9     Active  669345210

 

 Problem  Seasonal allergic rhinitis due to pollen     J30.1     Active  
53523182

 

 Problem  Anemia of chronic disease     D63.8     Active  520106500

 

 Problem  Chronic kidney disease, stage 3 (moderate)     N18.3     Active  
235081556







ALLERGIES

No Known Allergies



ENCOUNTERS







 Encounter  Location  Date  Diagnosis

 

 [place] [address] [place] [address] 36620-
8296    Type 2 diabetes mellitus without complication, without long-
term current use of insulin E11.9 ; Essential hypertension I10 and Anemia of 
chronic disease D63.8

 

 [facility]  [address] [place] [id number] [place] [address] 15739-
2870    Arthritis M19.90 and Essential hypertension I10

 

 [facility]  [address] [place] [address] 43970-
5154  15 May, 2018   

 

 [facility]  [address] [place] [address] 56453-
6355  15 May, 2018   

 

 [place] [address] [place] [id number] [place] [address] 16604-
7565  15 May, 2018  Medicare annual wellness visit, initial Z00.00 ; Type 2 
diabetes mellitus without complication, without long-term current use of 
insulin E11.9 ; Chronic kidney disease, stage 3 (moderate) N18.3 ; Essential 
hypertension I10 ; Gastroesophageal reflux disease, esophagitis presence not 
specified K21.9 ; Anxiety F41.9 ; Arthritis M19.90 and Encounter for 
immunization Z23

 

 [place] [address] [place] [id number] [place] [address] 05463-
2849  03 May, 2018  Essential hypertension I10

 

 [place] [address] [place] [id number] [place] [address] 66752-
1200    Arthritis M19.90

 

 [place] [address] [place] [id number] [place] [address] 56714-
7894     

 

 [place] [address] [place] [id number] [place] [address] 22340-
0139     

 

 [place] [address] [place] [id number] [place] [address] 84632-
7579    Essential hypertension I10

 

 [facility]  [id number] [place] [address] [phone]
-[phone]  27 Mar, 2018  Dysuria R30.0 and Vaginal candida B37.3

 

 [place] [address] [place] [id number] [place] [address] 82721-
3520  08 Mar, 2018  Arthritis M19.90

 

 [place] [address] [place] [id number] [place] [address] 96128-
3961     

 

 [place] [address] [place] [id number] [place] [address] 52820-
2010    Type 2 diabetes mellitus without complication, without long-
term current use of insulin E11.9 ; Lumbago with sciatica, left side M54.42 ; 
Arthritis M19.90 ; Iron deficiency anemia, unspecified iron deficiency anemia 
type D50.9 and BMI 40.0-44.9, adult Z68.41

 

 [place] [address] [place] [id number] [place] [address] 17846-
3860     

 

 [facility]  [address] [address]-
8722     

 

 [facility]  [address] [id number] [place] [address] 98280-
5996    Arthritis M19.90 and Anxiety F41.9

 

 [facility]  [address] 11399-
2132     

 

 [facility]  [address] [id number] [place] [address] 47780-
9012  18 Dec, 2017  Anxiety F41.9

 

 [facility]  [address] 79407-
8252     

 

 [facility]  [address] 19384-
8512     

 

 [facility]  [address] [id number] [place] [address] 23531-
4010    Vaginal yeast infection B37.3

 

 [facility]  [id number] [place] [address] 55581-
1624    Anxiety F41.9

 

 [facility]  [address] [id number] [place] [address] 05160-
4742  09 2017  Type 2 diabetes mellitus without complication, without long-
term current use of insulin E11.9

 

 [facility]  [address] [id number] [place] [address] 50469-
2262  08 2017   

 

 [facility]  [address] [id number] [place] [address] 73577-
7792     

 

 [facility]  [address] [id number] [place] [address] 84658-
3157  24 Oct, 2017  Arthritis M19.90

 

 [facility]  [id number] [place] [address] 31489-
5690  16 Oct, 2017   

 

 [facility] [place] [address] [place] [id number] [place] [address] 47741-
0992  04 Oct, 2017   

 

 [place] [address] [place] [id number] [place] [address] 00691-
4839  05 Sep, 2017   

 

 [place] [address] [place] [id number] [place] [address] 24400-
5237  25 Aug, 2017  Venous insufficiency I87.2 and Venous stasis dermatitis of 
right lower extremity I87.2

 

 [place] [address] [place] [id number] [place] [address] 83177-
6114  21 Aug, 2017  Essential hypertension I10

 

 [address] [address]153-
3194     

 

 [place] [address] [place] [id number] [place] [address] 65127-
5809    Type 2 diabetes mellitus without complication, without long-
term current use of insulin E11.9 and Essential hypertension I10

 

 [place] [address] [place] [id number] [place] [address] 42224-
2848    Essential hypertension I10 and Type 2 diabetes mellitus 
without complication, without long-term current use of insulin E11.9

 

 [place] [id number] [place] [address] 30085-
9691    Chronic kidney disease, stage 3 (moderate) N18.3 ; Anemia 
of chronic disease D63.8 and Type 2 diabetes mellitus without complication, 
without long-term current use of insulin E11.9

 

 [place] [address] [place] [id number] [place] [address] 49313-
1039    Type 2 diabetes mellitus without complication, without long-
term current use of insulin E11.9 ; Iron deficiency anemia secondary to 
inadequate dietary iron intake D50.8 ; Arthritis M19.90 and Lumbago with 
sciatica, left side M54.42

 

 [place] [id number] [place] [address] 61591-
3206    Arthritis M19.90 and Anxiety F41.9

 

 [address] [place] [address]-
2792    Type 2 diabetes mellitus without complication, without long-
term current use of insulin E11.9 and Essential hypertension I10

 

 [place] [address] [place] [id number] [place] [address] 00922-
8820  22 May, 2017  Anxiety F41.9

 

 [facility]  [address] 97092-
1718  18 May, 2017  Monilial rash B37.2

 

 [facility]  [address] 57531-
4790  16 May, 2017   

 

 [place] [address] 42775-
4003  15 May, 2017   

 

 [facility]  [address] 65122-
2361  11 May, 2017   

 

 [facility]  [address] 87269-
3675  11 May, 2017  Gastroesophageal reflux disease, esophagitis presence not 
specified K21.9

 

 [place] [address] [place] [id number] [place] [address] 33867-
0464  09 May, 2017  Arthritis M19.90

 

 [place] [address] [place] [id number] [place] [address] 14905-
5355  09 May, 2017  Anxiety F41.9 ; Arthritis M19.90 and Essential hypertension 
I10

 

 [place] [address] [place] [id number] [place] [address] 68817-
2119  05 May, 2017  Essential hypertension I10 and Anxiety F41.9

 

 [place] [address] [place] [id number] [place] [address] 87796-
0304     

 

 [place] [address] [place] [id number] [place] [address] 18705-
0353    Arthritis M19.90 and Anxiety F41.9

 

 [place] [address] [place] [id number] [place] [address] 20828-
9170    Type 2 diabetes mellitus without complication, without long-
term current use of insulin E11.9 ; Cellulitis of right lower extremity L03.115 
and Arthritis M19.90

 

 [facility]  [id number] [place] [address] 54912-
6850  28 Mar, 2017   

 

 [place] [address] [place] [id number] [place] [address] 44665-
8950  20 Mar, 2017  Type 2 diabetes mellitus without complication, without long-
term current use of insulin E11.9 ; Bronchitis J40 and Arthritis M19.90

 

 [facility]  [id number] [place] [address] 27343-
1361  16 Mar, 2017   

 

 [place] [address] 57271-
3898  24 2017  Anxiety F41.9

 

 [facility] [place] [address] [address] [id number] [place] [address] 11421
-[phone]    Dysuria R30.0 ; Acute cystitis with hematuria N30.01 and 
Vaginal yeast infection B37.3

 

 [place] [address] [place] [id number] [place] [address] 23978-
0577    Arthritis M19.90

 

 [facility] [place] [address] [id number] [place] [address] 24410
-[phone]    Strep pharyngitis J02.0 and Sore throat J02.9

 

 [place] [address] [place] [id number] [place] [address] 35296-
0639     

 

 [place] [address] [place] [id number] [place] [address] 73535-
1867    Type 2 diabetes mellitus without complication, without long-
term current use of insulin E11.9

 

 [place] [address] [place] [id number] [place] [address] 22613-
9883  14 Dec, 2016   

 

 [place] [address] [place] [id number] [place] [address] 57037-
6404  13 Dec, 2016  Seasonal allergic rhinitis due to pollen J30.1

 

 [facility]  [id number] [place] [address] 42239
-[phone]  09 Dec, 2016  Impacted cerumen of right ear H61.21 and Seasonal allergic 
rhinitis due to pollen J30.1

 

 [facility]  [id number] [place] [address] 40664-
9117  09 Dec, 2016   

 

 [facility]  [address] [id number] [place] [address] 45723-
3741  05 Dec, 2016   

 

 [facility]  [address] [address]21-
3960    Type 2 diabetes mellitus without complication, without long-
term current use of insulin E11.9 ; Anxiety F41.9 ; Essential hypertension I10 
and Encounter for immunization Z23

 

 [facility]  [address] [id number] [place] [address] 53055-
8050    Essential hypertension I10

 

 [facility]  [address] 24428-
7395     

 

 [facility]  [id number] [place] [address] 37557-
0114     

 

 [facility]  [id number] [place] [address] 35266-
0357    Essential hypertension I10

 

 [facility]  [id number] [place] [address] 78365-
6746     

 

 [facility]  [address] [id number] [place] [address] 34096-
9430  23 Sep, 2016   

 

 [facility]  [address] [id number] [place] [address] 24324-
9154  01 Sep, 2016   

 

 [facility]  [address] 75719-
8018  30 Aug, 2016   

 

 [facility]  [id number] [place] [address] 17806-
0301  24 Aug, 2016   

 

 [facility]  [id number] [place] [address] 99358-
8113  22 Aug, 2016   

 

 [facility]  [address] [facility] [id number] [place] [address]-
0783  22 Aug, 2016  Type 2 diabetes mellitus without complication, without long-
term current use of insulin E11.9 ; Essential hypertension I10 ; Acute non-
recurrent maxillary sinusitis J01.00 ; Arthritis M19.90 ; Lumbago with sciatica
, left side M54.42 ; Other chronic pain G89.29 and Anxiety F41.9







IMMUNIZATIONS

No Known Immunizations



SOCIAL HISTORY

Never Assessed



REASON FOR VISIT

PT wants a wheelchair-Eunice KLINE



PLAN OF CARE







 Activity  Details









  









 Follow Up  3 Months Reason:







VITAL SIGNS







 Height  56 in  2018

 

 Weight  186.5 lbs  2018

 

 Temperature  97.7 degrees Fahrenheit  2018

 

 Heart Rate  82 bpm  2018

 

 Respiratory Rate  20   2018

 

 Oximetry  on room air:98 %  2018

 

 BMI  41.81 kg/m2  2018

 

 Blood pressure systolic  126 mmHg  2018

 

 Blood pressure diastolic  64 mmHg  2018







MEDICATIONS







 Medication  Instructions  Dosage  Frequency  Start Date  End Date  Duration  
Status

 

 Alprazolam 0.5 MG  Orally Three times a day  1 tablet  8h        30 days  
Active

 

 ProAir  (90 Base) MCG/ACT  Inhalation every 4-6 hours as needed  2 
puffs as needed           30 days  Active

 

 Timolol Hemihydrate 0.5 %  Ophthalmic Twice a day  1 drop into affected eye  
12h           Active

 

 Wheelchair -     use for transportation  24h          Not-Taking

 

 Omeprazole 20 MG  Orally Once a day  1 capsule  24h       30 day(s
)  Active

 

 Glimepiride 4 MG  Orally Once a day  1 tablet with breakfast or the first main 
meal of the day  24h       90 days  Active

 

 Wheelchair -     as directed             Active

 

 Monistat 1 Combo Pack 1200 & 2 MG & %  Vaginal Once a day  use insert and 
cream  24h          Not-Taking

 

 Lisinopril 5 mg  Orally Once a day  1 tablet  24h           Active

 

 Metoprolol Tartrate 25     TAKE ONE TABLET BY MOUTH TWICE A DAY           30  
Active

 

 Wheelchair -     use for ambulation  24h  09 May, 2017        Not-Taking

 

 Blood Glucose Test Strip Test Strips  subcutaneously Once a day  1 test strip  
24h          Active

 

 Tramadol-Acetaminophen 37.5-325 MG  Orally every 4 hrs  2 tablets as needed  
4h        28 days  Active







RESULTS

No Results



PROCEDURES







 Procedure  Date Ordered  Result  Body Site

 

 MEASURE BLOOD OXYGEN LEVEL  2018      

 

 GLYCATED HEMOGLOBIN TEST  2018      

 

 [facility] VISIT ESTABLISHED PATIENT  2018      

 

 VENIPUNCT, ROUTINE*  2018      







INSTRUCTIONS





MEDICATIONS ADMINISTERED

No Known Medications



MEDICAL (GENERAL) HISTORY







 Type  Description  Date

 

 Medical History  type II diabetes   

 

 Medical History  hypertension   

 

 Medical History  Arthritis   

 

 Medical History  skin cancer-scalp   

 

 Surgical History  hysterectomy   

 

 Surgical History  skin cancer removal-scalp   

 

 Hospitalization History  Surgery(s) only   

 

 Hospitalization History  dehydration  2017

 

 Hospitalization History  bronchitis  2107

## 2019-02-08 NOTE — XMS REPORT
[facility]

 Created on: 2018



Sandrita Stoddard

External Reference #: [id number]

: 1934

Sex: Female



Demographics







 Address  [address] [place]  [address]

 

 Preferred Language  Unknown

 

 Marital Status  Unknown

 

 Druze Affiliation  Unknown

 

 Race  Unknown

 

 Ethnic Group  Unknown





Author







 Author  YULISA RANGEL

 

 [facility]

 

 Address  [address] [place]  [address]



 

 Phone  [phone]







Care Team Providers







 Care Team Member Name  Role  Phone

 

 YULISA RANGEL  Unavailable  [phone]







PROBLEMS







 Type  Condition  ICD9-CM Code  VLJ57-LN Code  Onset Dates  Condition Status  
SNOMED Code

 

 Problem  Essential hypertension     I10     Active  46844963

 

 Problem  Type 2 diabetes mellitus without complication, without long-term 
current use of insulin     E11.9     Active  629071777

 

 Problem  Anxiety     F41.9     Active  93545480

 

 Problem  Lumbago with sciatica, left side     M54.42     Active  428156202

 

 Problem  Arthritis     M19.90     Active  9194534

 

 Problem  Iron deficiency anemia, unspecified iron deficiency anemia type     
D50.9     Active  36007370

 

 Problem  Venous insufficiency     I87.2     Active  30441640

 

 Problem  Gastroesophageal reflux disease, esophagitis presence not specified  
   K21.9     Active  865640634

 

 Problem  Seasonal allergic rhinitis due to pollen     J30.1     Active  
47557919

 

 Problem  Anemia of chronic disease     D63.8     Active  144425527

 

 Problem  Chronic kidney disease, stage 3 (moderate)     N18.3     Active  
434139406







ALLERGIES

No Information



ENCOUNTERS







 Encounter  Location  Date  Diagnosis

 

 [place] [id number] [place] [address] 44060-
6849  06 Aug, 2018   

 

 [place] [id number] [place] [address] 90444-
0609    Essential hypertension I10

 

 [place] [id number] [place] [address] 05361-
3776    Type 2 diabetes mellitus without complication, without long-
term current use of insulin E11.9 ; Essential hypertension I10 and Anemia of 
chronic disease D63.8

 

 [place] [id number] [place] [address] 50063-
9091    Arthritis M19.90 and Essential hypertension I10

 

 [place] [id number] [place] [address] 45263-
0108  15 May, 2018   

 

 [facility]  [id number] [place] [address] 78675-
4752  15 May, 2018   

 

 [facility]  [id number] [place] [address] 77279-
1364  15 May, 2018  Medicare annual wellness visit, initial Z00.00 ; Type 2 
diabetes mellitus without complication, without long-term current use of 
insulin E11.9 ; Chronic kidney disease, stage 3 (moderate) N18.3 ; Essential 
hypertension I10 ; Gastroesophageal reflux disease, esophagitis presence not 
specified K21.9 ; Anxiety F41.9 ; Arthritis M19.90 and Encounter for 
immunization Z23

 

 [place] [id number] [place] [address] 87466-
7009  03 May, 2018  Essential hypertension I10

 

 [place] [id number] [place] [address] 43184-
3922    Arthritis M19.90

 

 [facility]  [id number] [place] [address] 70697-
6717     

 

 [facility]  [id number] [place] [address] 78332-
9970     

 

 [facility]  [id number] [place] [address] 80135-
3754    Essential hypertension I10

 

 [facility]  [id number] [place] [address] 92930
-[phone]  27 Mar, 2018  Dysuria R30.0 and Vaginal candida B37.3

 

 [facility]  [id number] [place] [address] 20635-
7424  08 Mar, 2018  Arthritis M19.90

 

 [facility]  [id number] [place] [address] 85071-
2543     

 

 [facility]  [id number] [place] [address] 12434-
5612    Type 2 diabetes mellitus without complication, without long-
term current use of insulin E11.9 ; Lumbago with sciatica, left side M54.42 ; 
Arthritis M19.90 ; Iron deficiency anemia, unspecified iron deficiency anemia 
type D50.9 and BMI 40.0-44.9, adult Z68.41

 

 [place] [id number] [place] [address] 41725-
4396     

 

 [place] [address] 02646-
6807     

 

 [place] [address] 82613-
0559    Arthritis M19.90 and Anxiety F41.9

 

 [place] [address] 05515-
6187     

 

 [place] [id number] [place] [address] 59372-
4081  18 Dec, 2017  Anxiety F41.9

 

 [place] [id number] [place] [address] 30398-
3070     

 

 [place] [id number] [place] [address] 01340-
7207     

 

 [place] [id number] [place] [address] 95678-
2391    Vaginal yeast infection B37.3

 

 [place] [id number] [place] [address] 43068-
3477    Anxiety F41.9

 

 [place] [id number] [place] [address] 15597-
5091    Type 2 diabetes mellitus without complication, without long-
term current use of insulin E11.9

 

 [place] [id number] [place] [address] 71445-
9802     

 

 [place] [id number] [place] [address] 00706-
3866     

 

 [place] [id number] [place] [address] 36183-
6102  24 Oct, 2017  Arthritis M19.90

 

 [place] [id number] [place] [address] 34883-
6468  16 Oct, 2017   

 

 [place] [id number] [place] [address] 81326-
0513  04 Oct, 2017   

 

 [place] [id number] [place] [address] 66714-
4066  05 Sep, 2017   

 

 [place] [id number] [place] [address] 30179-
4748  25 Aug, 2017  Venous insufficiency I87.2 and Venous stasis dermatitis of 
right lower extremity I87.2

 

 [place] [id number] [place] [address] 23890-
0906  21 Aug, 2017  Essential hypertension I10

 

 [place] [id number] [place] [address] 44229-
4611     

 

 [place] [id number] [place] [address] 78373-
6305    Type 2 diabetes mellitus without complication, without long-
term current use of insulin E11.9 and Essential hypertension I10

 

 [place] [address] [place] [address] 46675-
4562    Essential hypertension I10 and Type 2 diabetes mellitus 
without complication, without long-term current use of insulin E11.9

 

 [place] [id number] [place] [address] 18844-
5258    Chronic kidney disease, stage 3 (moderate) N18.3 ; Anemia 
of chronic disease D63.8 and Type 2 diabetes mellitus without complication, 
without long-term current use of insulin E11.9

 

 [place] [address] [place] [address] 64675-
3574    Type 2 diabetes mellitus without complication, without long-
term current use of insulin E11.9 ; Iron deficiency anemia secondary to 
inadequate dietary iron intake D50.8 ; Arthritis M19.90 and Lumbago with 
sciatica, left side M54.42

 

 [facility]  [id number] [place] [address] 70643-
2685    Arthritis M19.90 and Anxiety F41.9

 

 [facility]  [id number] [place] [address] 49237-
8627    Type 2 diabetes mellitus without complication, without long-
term current use of insulin E11.9 and Essential hypertension I10

 

 [facility]  [address] 39097-
8685  22 May, 2017  Anxiety F41.9

 

 [facility]  [address] 11779-
4134  18 May, 2017  Monsoledad rash B37.2

 

 [facility]  [address] 92682-
1785  16 May, 2017   

 

 [facility]  [address] 31565-
1393  15 May, 2017   

 

 [facility]  [address] 26392-
8084  11 May, 2017   

 

 [facility]  [address] 70303-
5938  11 May, 2017  Gastroesophageal reflux disease, esophagitis presence not 
specified K21.9

 

 [facility]  [id number] [place] [address] 00232-
7012  09 May, 2017  Arthritis M19.90

 

 [facility]  [address] 09882-
8329  09 May, 2017  Anxiety F41.9 ; Arthritis M19.90 and Essential hypertension 
I10

 

 [facility]  [address] 19303-
6862  05 May, 2017  Essential hypertension I10 and Anxiety F41.9

 

 [facility]  [id number] [place] [address] 50247-
2542     

 

 [facility]  [address] [place] [address]-
4980    Arthritis M19.90 and Anxiety F41.9

 

 [place] [address] 87291-
1349    Type 2 diabetes mellitus without complication, without long-
term current use of insulin E11.9 ; Cellulitis of right lower extremity L03.115 
and Arthritis M19.90

 

 [place] [address] 28495-
7365  28 Mar, 2017   

 

 [place] [address] 45623-
5686  20 Mar, 2017  Type 2 diabetes mellitus without complication, without long-
term current use of insulin E11.9 ; Bronchitis J40 and Arthritis M19.90

 

 [place] [address] 09909-
2366  16 Mar, 2017   

 

 [place] [address] 54873-
8798    Anxiety F41.9

 

 [facility]  [address] 98146
-[phone]    Dysuria R30.0 ; Acute cystitis with hematuria N30.01 and 
Vaginal yeast infection B37.3

 

 [place] [id number] [place] [address] 62783-
9359    Arthritis M19.90

 

 [facility]  [id number] [place] [address] 77809
-[phone]    Strep pharyngitis J02.0 and Sore throat J02.9

 

 [place] [address] 68490-
8001     

 

 [place] [address] 61020-
3290    Type 2 diabetes mellitus without complication, without long-
term current use of insulin E11.9

 

 [place] [address] 31619-
3914  14 Dec, 2016   

 

 [facility]  [address] 89700-
8650  13 Dec, 2016  Seasonal allergic rhinitis due to pollen J30.1

 

 [facility] [place] [facility]  [address] 09900
[phone]  09 Dec, 2016  Impacted cerumen of right ear H61.21 and Seasonal allergic 
rhinitis due to pollen J30.1

 

 [facility]  [address] 53526-
5793  09 Dec, 2016   

 

 [facility]  [address] 76190-
2187  05 Dec, 2016   

 

 [facility]  [address] 40577-
7597    Type 2 diabetes mellitus without complication, without long-
term current use of insulin E11.9 ; Anxiety F41.9 ; Essential hypertension I10 
and Encounter for immunization Z23

 

 [facility]  [address] 36516-
1986    Essential hypertension I10

 

 [place] [address] 84207-
7750     

 

 [place] [address] 30845-
5962     

 

 [facility]  [address] 75784-
9733    Essential hypertension I10

 

 [facility]  [id number] [place] [address] 95868-
6551     

 

 [facility]  [address] 91200-
6878  23 Sep, 2016   

 

 [facility]  [address] 33289-
3937  01 Sep, 2016   

 

 [facility]  [address] 49340-
4852  30 Aug, 2016   

 

 [facility]  [address] [facility] [id number] [address]-
5156  24 Aug, 2016   

 

 [facility]  [address] [facility] [id number] [address]559-
4049  22 Aug, 2016   

 

 [facility]  [address] [facility] [id number] [address]-
0947  22 Aug, 2016  Type 2 diabetes mellitus without complication, without long-
term current use of insulin E11.9 ; Essential hypertension I10 ; Acute non-
recurrent maxillary sinusitis J01.00 ; Arthritis M19.90 ; Lumbago with sciatica
, left side M54.42 ; Other chronic pain G89.29 and Anxiety F41.9







IMMUNIZATIONS

No Known Immunizations



SOCIAL HISTORY

Never Assessed



REASON FOR VISIT

Xanax



PLAN OF CARE





VITAL SIGNS





MEDICATIONS







 Medication  Instructions  Dosage  Frequency  Start Date  End Date  Duration  
Status

 

 Alprazolam 0.5 MG  Orally Three times a day  1 tablet  8h        30 days  
Active







RESULTS

No Results



PROCEDURES

No Known procedures



INSTRUCTIONS





MEDICATIONS ADMINISTERED

No Known Medications



MEDICAL (GENERAL) HISTORY







 Type  Description  Date

 

 Medical History  type II diabetes   

 

 Medical History  hypertension   

 

 Medical History  Arthritis   

 

 Medical History  skin cancer-scalp   

 

 Surgical History  hysterectomy   

 

 Surgical History  skin cancer removal-scalp   

 

 Hospitalization History  Surgery(s) only   

 

 Hospitalization History  dehydration  2017

 

 Hospitalization History  bronchitis  2107

## 2019-02-08 NOTE — XMS REPORT
[facility]

 Created on: 2018



Richi Sandrita

External Reference #: [id number]

: 1934

Sex: Female



Demographics







 Address  [address] [place]  [address]

 

 Preferred Language  Unknown

 

 Marital Status  Unknown

 

 Shinto Affiliation  Unknown

 

 Race  Unknown

 

 Ethnic Group  Unknown





Author







 Author  YULISA RANGEL

 

 [facility]

 

 Address  [address] [place]  [address]



 

 Phone  [phone]







Care Team Providers







 Care Team Member Name  Role  Phone

 

 YULISA RANGEL  Unavailable  [phone]







PROBLEMS







 Type  Condition  ICD9-CM Code  MBS89-XB Code  Onset Dates  Condition Status  
SNOMED Code

 

 Problem  Essential hypertension     I10     Active  82503937

 

 Problem  Type 2 diabetes mellitus without complication, without long-term 
current use of insulin     E11.9     Active  488633849

 

 Problem  Anxiety     F41.9     Active  27291972

 

 Problem  Lumbago with sciatica, left side     M54.42     Active  383032387

 

 Problem  Arthritis     M19.90     Active  9504901

 

 Problem  Iron deficiency anemia, unspecified iron deficiency anemia type     
D50.9     Active  69509888

 

 Problem  Venous insufficiency     I87.2     Active  42351002

 

 Problem  Gastroesophageal reflux disease, esophagitis presence not specified  
   K21.9     Active  238172678

 

 Problem  Seasonal allergic rhinitis due to pollen     J30.1     Active  
41633177

 

 Problem  Anemia of chronic disease     D63.8     Active  829194510

 

 Problem  Chronic kidney disease, stage 3 (moderate)     N18.3     Active  
805012231







ALLERGIES

No Known Allergies



ENCOUNTERS







 Encounter  Location  Date  Diagnosis

 

 [place] [address] [place] [address] 18227-
6438  15 May, 2018  Medicare annual wellness visit, initial Z00.00

 

 [facility]  [address] [place] [address] 79210-
2213    Arthritis M19.90

 

 [facility]  [address] [place] [address] 42166-
3425     

 

 [place] [address] [place] [id number] [place] [address] 59721-
9033     

 

 [place] [address] [place] [address] 42920-
8705    Essential hypertension I10

 

 [facility]  [id number] [place] [address] 36552
-[phone]  27 Mar, 2018  Dysuria R30.0 and Vaginal candida B37.3

 

 [facility]  [id number] [place] [address] 51159-
0757  08 Mar, 2018  Arthritis M19.90

 

 [place] [address] [address]-
6451     

 

 [place] [address] [address]-
7493    Type 2 diabetes mellitus without complication, without long-
term current use of insulin E11.9 ; Lumbago with sciatica, left side M54.42 ; 
Arthritis M19.90 ; Iron deficiency anemia, unspecified iron deficiency anemia 
type D50.9 and BMI 40.0-44.9, adult Z68.41

 

 [place] [address] [address]-
3910     

 

 [place] [address] [place] [id number] [place] [address] 05279-
3027     

 

 [facility]  [address] [address]-
5260    Arthritis M19.90 and Anxiety F41.9

 

 [place] [address] [place] [id number] [place] [address] 35477-
5070     

 

 [place] [address] [place] [address] [id number] [place] [address] 36704-
3077  18 Dec, 2017  Anxiety F41.9

 

 [place] [address] [place] [id number] [place] [address] 30445-
3898     

 

 [place] [address] 25990-
1817     

 

 [facility]  [id number] [place] [address] 40261-
9890    Vaginal yeast infection B37.3

 

 [place] [address] [address]-
6181  14 2017  Anxiety F41.9

 

 [facility]  [id number] [place] [address] 67540-
6870    Type 2 diabetes mellitus without complication, without long-
term current use of insulin E11.9

 

 [facility]  [address] [place] [address] 23007-
3764     

 

 [facility]  [id number] [place] [address] 36938-
8901     

 

 [facility]  [id number] [place] [address] 92837-
1424  24 Oct, 2017  Arthritis M19.90

 

 [facility]  [id number] [place] [address] 50356-
4656  16 Oct, 2017   

 

 [facility]  [id number] [place] [address] 41817-
6190  04 Oct, 2017   

 

 [facility]  [address] [place] [address] 36606-
7703  05 Sep, 2017   

 

 [facility]  [id number] [place] [address] 28958-
9784  25 Aug, 2017  Venous insufficiency I87.2 and Venous stasis dermatitis of 
right lower extremity I87.2

 

 [place] [address] [place] [address] 76301-
9907  21 Aug, 2017  Essential hypertension I10

 

 [facility]  [address] [address]-
0852     

 

 [facility]  [id number] [place] [address] 79657-
7371    Type 2 diabetes mellitus without complication, without long-
term current use of insulin E11.9 and Essential hypertension I10

 

 [facility]  [address] [address]-
0681    Essential hypertension I10 and Type 2 diabetes mellitus 
without complication, without long-term current use of insulin E11.9

 

 [place] [address] [place] [id number] [place] [address] 90507-
5459  18 2017  Chronic kidney disease, stage 3 (moderate) N18.3 ; Anemia 
of chronic disease D63.8 and Type 2 diabetes mellitus without complication, 
without long-term current use of insulin E11.9

 

 [place] [address] 65[address]-
4451  14 2017  Type 2 diabetes mellitus without complication, without long-
term current use of insulin E11.9 ; Iron deficiency anemia secondary to 
inadequate dietary iron intake D50.8 ; Arthritis M19.90 and Lumbago with 
sciatica, left side M54.42

 

 [place] [address] 62370-
3144    Arthritis M19.90 and Anxiety F41.9

 

 [place] [address] 34953-
8991    Type 2 diabetes mellitus without complication, without long-
term current use of insulin E11.9 and Essential hypertension I10

 

 [address] [address]-
4328  22 May, 2017  Anxiety F41.9

 

 [place] [address] 97422-
0770  18 May, 2017  Monilial rash B37.2

 

 [place] [address] [address]144-
9652  16 May, 2017   

 

 [place] [address] [place] [id number] [place] [address] 15040-
5577  15 May, 2017   

 

 [place] [address] 84157-
9850  11 May, 2017   

 

 [place] [address] 65453-
5977  11 May, 2017  Gastroesophageal reflux disease, esophagitis presence not 
specified K21.9

 

 [place] [address] [address]-
8457  09 May, 2017  Arthritis M19.90

 

 [facility]  [address] [address] [id number] [place] [address] 44528-
1610  09 May, 2017  Anxiety F41.9 ; Arthritis M19.90 and Essential hypertension 
I10

 

 [facility]  [address] [address] [id number] [place] [address] 76961-
9562  05 May, 2017  Essential hypertension I10 and Anxiety F41.9

 

 [facility]  [address] [address] [place] [address] 91811-
0229     

 

 [place] [address] 41442-
3499    Arthritis M19.90 and Anxiety F41.9

 

 [facility]  [address] [address] [place] [address] 72771-
7850    Type 2 diabetes mellitus without complication, without long-
term current use of insulin E11.9 ; Cellulitis of right lower extremity L03.115 
and Arthritis M19.90

 

 [facility]  [address] [place] [address] 96806-
4281  28 Mar, 2017   

 

 [place] [address] [place] [address] [place] [address] 78498-
1852  20 Mar, 2017  Type 2 diabetes mellitus without complication, without long-
term current use of insulin E11.9 ; Bronchitis J40 and Arthritis M19.90

 

 [facility]  [address] [place] [address] 48126-
0641  16 Mar, 2017   

 

 [facility]  [address] [address] [place] [address] 68016-
1595    Anxiety F41.9

 

 [facility]  [id number] [place] [address] 78973
-3510    Dysuria R30.0 ; Acute cystitis with hematuria N30.01 and 
Vaginal yeast infection B37.3

 

 [facility]  [id number] [place] [address] 29464-
3054    Arthritis M19.90

 

 [facility]  [id number] [place] [address] 20521
-[phone]    Strep pharyngitis J02.0 and Sore throat J02.9

 

 [facility]  [id number] [place] [address] 32846-
1697     

 

 [place] [address] 71248-
7578    Type 2 diabetes mellitus without complication, without long-
term current use of insulin E11.9

 

 [place] [address] [place] [id number] [place] [address] 28951-
2016  14 Dec, 2016   

 

 [place] [address] 90562-
1444  13 Dec, 2016  Seasonal allergic rhinitis due to pollen J30.1

 

 [facility]  [address] [address]
[phone]  09 Dec, 2016  Impacted cerumen of right ear H61.21 and Seasonal allergic 
rhinitis due to pollen J30.1

 

 [place] [address] 48165-
9335  09 Dec, 2016   

 

 [place] [address] 71833-
9893  05 Dec, 2016   

 

 [place] [address] [place] [address] [place] [address] 67887-
5873    Type 2 diabetes mellitus without complication, without long-
term current use of insulin E11.9 ; Anxiety F41.9 ; Essential hypertension I10 
and Encounter for immunization Z23

 

 [place] [address] [address]-
1821    Essential hypertension I10

 

 [place] [address] [place] [id number] [place] [address] 92219-
6696     

 

 [place] [address] 81849-
1536     

 

 [facility]  [address] [place] [id number] [address]-
4485    Essential hypertension I10

 

 [facility]  [address] [address]-
9385     

 

 [facility]  [address] [address]-
3878  23 Sep, 2016   

 

 [facility]  [id number] [place] [address] 44875-
8983  01 Sep, 2016   

 

 [facility]  [address] [place] [address] 73572-
6115  30 Aug, 2016   

 

 [facility]  [address] [place] [address] 77385-
2559  24 Aug, 2016   

 

 [facility]  [id number] [place] [address] 01526-
9954  22 Aug, 2016   

 

 [facility]  [address] [place] [address] 41594-
7260  22 Aug, 2016  Type 2 diabetes mellitus without complication, without long-
term current use of insulin E11.9 ; Essential hypertension I10 ; Acute non-
recurrent maxillary sinusitis J01.00 ; Arthritis M19.90 ; Lumbago with sciatica
, left side M54.42 ; Other chronic pain G89.29 and Anxiety F41.9







IMMUNIZATIONS

No Known Immunizations



SOCIAL HISTORY

Never Assessed



REASON FOR VISIT

Diabetes right legs swelling and broken out. CBrumbackRN



PLAN OF CARE







 Activity  Details









  









 Follow Up  2 Months Reason:







VITAL SIGNS







 Height  56 in  2017

 

 Weight  170.6 lbs  2017

 

 Temperature  97.5 degrees Fahrenheit  2017

 

 Heart Rate  92 bpm  2017

 

 Respiratory Rate  20   2017

 

 BMI  38.24 kg/m2  2017

 

 Blood pressure systolic  124 mmHg  2017

 

 Blood pressure diastolic  54 mmHg  2017







MEDICATIONS







 Medication  Instructions  Dosage  Frequency  Start Date  End Date  Duration  
Status

 

 ProAir  (90 Base) MCG/ACT  Inhalation every 4-6 hours as needed  2 
puffs as needed           30 days  Active

 

 Glimepiride 2 MG  Orally Once a day  1 tablet with breakfast or the first main 
meal of the day  24h  18 2017     90 days  Active

 

 Timolol Hemihydrate 0.5 %  Ophthalmic Twice a day  1 drop into affected eye  
12h           Active

 

 Tramadol-Acetaminophen 37.5-325 MG  Orally every 4 hrs  2 tablets as needed  
4h        28 days  Active

 

 Aspir-81 81 MG  Orally Once a day  1 tablet  24h           Active

 

 Wheelchair -     use for transportation  24h          Active

 

 Wheelchair -     use for ambulation  24h  09 May, 2017        Active

 

 Lisinopril 5 mg  Orally Once a day  1 tablet  24h           Active

 

 Alprazolam 0.5 MG  Orally Three times a day  1 tablet  8h        30 days  
Active







RESULTS

No Results



PROCEDURES







 Procedure  Date Ordered  Result  Body Site

 

 [facility] VISIT ESTABLISHED PATIENT  Aug 25, 2017      







INSTRUCTIONS





MEDICATIONS ADMINISTERED

No Known Medications



MEDICAL (GENERAL) HISTORY







 Type  Description  Date

 

 Medical History  type II diabetes   

 

 Medical History  hypertension   

 

 Medical History  Arthritis   

 

 Medical History  skin cancer-scalp   

 

 Surgical History  hysterectomy   

 

 Surgical History  skin cancer removal-scalp   

 

 Hospitalization History  Surgery(s) only   

 

 Hospitalization History  dehydration  2017

 

 Hospitalization History  bronchitis  2107

## 2019-02-08 NOTE — XMS REPORT
[facility]

 Created on: 2018



Richi Sandrita

External Reference #: [id number]

: 1934

Sex: Female



Demographics







 Address  [address] [place]  [address]

 

 Preferred Language  Unknown

 

 Marital Status  Unknown

 

 Hinduism Affiliation  Unknown

 

 Race  Unknown

 

 Ethnic Group  Unknown





Author







 Author  YULISA RANGEL

 

 [facility]

 

 Address  [address] [place]  [address]



 

 Phone  [phone]







Care Team Providers







 Care Team Member Name  Role  Phone

 

 YULISA RANGEL  Unavailable  [phone]







PROBLEMS







 Type  Condition  ICD9-CM Code  UIV00-VC Code  Onset Dates  Condition Status  
SNOMED Code

 

 Problem  Essential hypertension     I10     Active  41193634

 

 Problem  Type 2 diabetes mellitus without complication, without long-term 
current use of insulin     E11.9     Active  118318256

 

 Problem  Anxiety     F41.9     Active  35806167

 

 Problem  Lumbago with sciatica, left side     M54.42     Active  217783960

 

 Problem  Arthritis     M19.90     Active  8737619

 

 Problem  Iron deficiency anemia, unspecified iron deficiency anemia type     
D50.9     Active  59197386

 

 Problem  Venous insufficiency     I87.2     Active  23819000

 

 Problem  Gastroesophageal reflux disease, esophagitis presence not specified  
   K21.9     Active  680118618

 

 Problem  Seasonal allergic rhinitis due to pollen     J30.1     Active  
84677451

 

 Problem  Anemia of chronic disease     D63.8     Active  110743830

 

 Problem  Chronic kidney disease, stage 3 (moderate)     N18.3     Active  
200275353







ALLERGIES

No Known Allergies



ENCOUNTERS







 Encounter  Location  Date  Diagnosis

 

 [place] [address] [address]
2725  06 Aug, 2018   

 

 [place] [address] [place] [id number] [place] [address]-
2856    Essential hypertension I10

 

 [place] [address] [place] [id number] [place] [address]-
0700    Type 2 diabetes mellitus without complication, without long-
term current use of insulin E11.9 ; Essential hypertension I10 and Anemia of 
chronic disease D63.8

 

 [place] [address] [address]
4514    Arthritis M19.90 and Essential hypertension I10

 

 [place] [address] [place] [address] [id number] [place] [address]-
7851  15 May, 2018   

 

 [facility]  [address] [address]-
8604  15 May, 2018   

 

 [facility]  [id number] [place] [address] 8[address]27-
6345  15 May, 2018  Medicare annual wellness visit, initial Z00.00 ; Type 2 
diabetes mellitus without complication, without long-term current use of 
insulin E11.9 ; Chronic kidney disease, stage 3 (moderate) N18.3 ; Essential 
hypertension I10 ; Gastroesophageal reflux disease, esophagitis presence not 
specified K21.9 ; Anxiety F41.9 ; Arthritis M19.90 and Encounter for 
immunization Z23

 

 [facility]  [id number] [place] [address] 36986-
6244  03 May, 2018  Essential hypertension I10

 

 [place] [address] [address]-
4427    Arthritis M19.90

 

 [facility]  [id number] [place] [address] 39330-
4568     

 

 [facility]  [address] [address]-
2070     

 

 [facility]  [id number] [place] [address] 88828-
4722    Essential hypertension I10

 

 [facility]  [id number] [place] [address] [phone]
[phone]  27 Mar, 2018  Dysuria R30.0 and Vaginal candida B37.3

 

 [facility]  [id number] [place] [address] 25943-
3901  08 Mar, 2018  Arthritis M19.90

 

 [facility]  [id number] [place] [address] 84883-
6349     

 

 [facility]  [id number] [place] [address] 14962-
9486    Type 2 diabetes mellitus without complication, without long-
term current use of insulin E11.9 ; Lumbago with sciatica, left side M54.42 ; 
Arthritis M19.90 ; Iron deficiency anemia, unspecified iron deficiency anemia 
type D50.9 and BMI 40.0-44.9, adult Z68.41

 

 [place] [address] [place] [address] [id number] [place] [address] 04067-
9039     

 

 [place] [address] [address]-
6592     

 

 [place] [address] [address]-
2019    Arthritis M19.90 and Anxiety F41.9

 

 [place] [address] [address]-
3516     

 

 [place] [address] [place] [id number] [place] [address] 35677-
5658  18 Dec, 2017  Anxiety F41.9

 

 [place] [address] [address]-
[address]     

 

 [place] [address] [address]-
9973     

 

 [place] [address] [place] [id number] [place] [address] 55004-
5896    Vaginal yeast infection B37.3

 

 [place] [address] [place] [id number] [place] [address] 26138-
6094    Anxiety F41.9

 

 [place] [address] [place] [address] [id number] [place] [address] 16049-
2511    Type 2 diabetes mellitus without complication, without long-
term current use of insulin E11.9

 

 [place] [address] [place] [id number] [place] [address] 63984-
5235     

 

 [place] [address] [place] [id number] [place] [address] 62918-
6435     

 

 [place] [address] [place] [address] 46631-
1807  24 Oct, 2017  Arthritis M19.90

 

 [place] [address] [place] [id number] [place] [address] 48996-
3625  16 Oct, 2017   

 

 [place] [address] [place] [id number] [place] [address] 37665-
2889  04 Oct, 2017   

 

 [place] [address] [place] [id number] [place] [address] 07459-
5747  05 Sep, 2017   

 

 [place] [address] [place] [id number] [place] [address] 17239-
8908  25 Aug, 2017  Venous insufficiency I87.2 and Venous stasis dermatitis of 
right lower extremity I87.2

 

 [place] [address] [place] [id number] [place] [address] 84781-
9650  21 Aug, 2017  Essential hypertension I10

 

 [place] [id number] [place] [address] 57490-
7956     

 

 [place] [address] [place] [id number] [place] [address] 97586-
1337    Type 2 diabetes mellitus without complication, without long-
term current use of insulin E11.9 and Essential hypertension I10

 

 [place] [address] [place] [address] 03149-
0938    Essential hypertension I10 and Type 2 diabetes mellitus 
without complication, without long-term current use of insulin E11.9

 

 [place] [address] [place] [id number] [place] [address] 12267-
2151    Chronic kidney disease, stage 3 (moderate) N18.3 ; Anemia 
of chronic disease D63.8 and Type 2 diabetes mellitus without complication, 
without long-term current use of insulin E11.9

 

 [place] [address] [place] [address] 32026-
5842    Type 2 diabetes mellitus without complication, without long-
term current use of insulin E11.9 ; Iron deficiency anemia secondary to 
inadequate dietary iron intake D50.8 ; Arthritis M19.90 and Lumbago with 
sciatica, left side M54.42

 

 [facility]  [address] [id number] [place] [address] 87067-
2154    Arthritis M19.90 and Anxiety F41.9

 

 [facility]  [id number] [place] [address] 90132-
7031    Type 2 diabetes mellitus without complication, without long-
term current use of insulin E11.9 and Essential hypertension I10

 

 [facility]  [address] [address]-
4404  22 May, 2017  Anxiety F41.9

 

 [facility]  [address] [address]-
4246  18 May, 2017  Monilial rash B37.2

 

 [facility]  [address] [address]-
0415  16 May, 2017   

 

 [facility]  [address] [address]-
6724  15 May, 2017   

 

 [facility]  [address] [address]-
8514  11 May, 2017   

 

 [facility]  [address] [address]-
3273  11 May, 2017  Gastroesophageal reflux disease, esophagitis presence not 
specified K21.9

 

 [facility]  [address] [place] [id number] [place] [address] 77660-
4182  09 May, 2017  Arthritis M19.90

 

 [facility]  [address] [address]-
5639  09 May, 2017  Anxiety F41.9 ; Arthritis M19.90 and Essential hypertension 
I10

 

 [facility]  [address] [address]-
5064  05 May, 2017  Essential hypertension I10 and Anxiety F41.9

 

 [facility]  [address] [address]-
6809     

 

 [facility]  [address] [place] [address]-
6398    Arthritis M19.90 and Anxiety F41.9

 

 [place] [address] [address]-
1933    Type 2 diabetes mellitus without complication, without long-
term current use of insulin E11.9 ; Cellulitis of right lower extremity L03.115 
and Arthritis M19.90

 

 [place] [address] [address]-
7172  28 Mar, 2017   

 

 [place] [address] [address]-
2470  20 Mar, 2017  Type 2 diabetes mellitus without complication, without long-
term current use of insulin E11.9 ; Bronchitis J40 and Arthritis M19.90

 

 [place] [address] [address]-
2807  16 Mar, 2017   

 

 [place] [address] [address]-
0108    Anxiety F41.9

 

 [facility]  [id number] [place] [address] 67219
[phone]    Dysuria R30.0 ; Acute cystitis with hematuria N30.01 and 
Vaginal yeast infection B37.3

 

 [place] [address] [place] [id number] [place] [address] 15040-
5941    Arthritis M19.90

 

 [facility]  [id number] [place] [address] 20767
-[phone]    Strep pharyngitis J02.0 and Sore throat J02.9

 

 [place] [address] [address]-
2983     

 

 [place] [address] [place] [id number] [place] [address] 29004-
2196    Type 2 diabetes mellitus without complication, without long-
term current use of insulin E11.9

 

 [place] [address] [place] [address]-
6844  14 Dec, 2016   

 

 [facility]  [address] [address]-
5177  13 Dec, 2016  Seasonal allergic rhinitis due to pollen J30.1

 

 [facility]  [address] [phone]
[phone]  09 Dec, 2016  Impacted cerumen of right ear H61.21 and Seasonal allergic 
rhinitis due to pollen J30.1

 

 [facility]  [address] [address]-
6539  09 Dec, 2016   

 

 [facility]  [address] [address]-
4907  05 Dec, 2016   

 

 [facility]  [address] [address]-
5598    Type 2 diabetes mellitus without complication, without long-
term current use of insulin E11.9 ; Anxiety F41.9 ; Essential hypertension I10 
and Encounter for immunization Z23

 

 [facility]  [address] [address]-
5485    Essential hypertension I10

 

 [place] [address] [address]-
4379     

 

 [place] [address] [address]-
5024     

 

 [facility]  [address] [address]-
5536    Essential hypertension I10

 

 [facility]  [address] [address]-
7598     

 

 [facility]  [address] [address]-
8015  23 Sep, 2016   

 

 [place] [address] [address]-
2992  01 Sep, 2016   

 

 [facility]  [address] [address]-
5801  30 Aug, 2016   

 

 [facility]  [address] [facility] [id number] [place] [address]-
3344  24 Aug, 2016   

 

 [facility]  [address] [facility] [id number] [address]-
9739  22 Aug, 2016   

 

 [facility]  [address] [facility] [id number] [address]-
7330  22 Aug, 2016  Type 2 diabetes mellitus without complication, without long-
term current use of insulin E11.9 ; Essential hypertension I10 ; Acute non-
recurrent maxillary sinusitis J01.00 ; Arthritis M19.90 ; Lumbago with sciatica
, left side M54.42 ; Other chronic pain G89.29 and Anxiety F41.9







IMMUNIZATIONS







 Vaccine  Route  Administration Date  Status

 

 TDAP (BOOSTRIX)  IM Intramuscular  May 15, 2018  Administered







SOCIAL HISTORY

Never Assessed



REASON FOR VISIT

Medicare AWV - Initial Visit WB-MA, Yeast infection, Patient is concerned about 
runnign out of test strips for testing her blood sugar



PLAN OF CARE







 Activity  Details









  









 Follow Up  1 Year Reason:







VITAL SIGNS







 Height  56 in  2018-05-15

 

 Weight  169 lbs  2018-05-15

 

 Temperature  97.4 degrees Fahrenheit  2018-05-15

 

 Heart Rate  78 bpm  2018-05-15

 

 Respiratory Rate  20   2018-05-15

 

 Oximetry  on room air:99 %  2018-05-15

 

 BMI  37.88 kg/m2  2018-05-15

 

 Blood pressure systolic  122 mmHg  2018-05-15

 

 Blood pressure diastolic  72 mmHg  2018-05-15







MEDICATIONS







 Medication  Instructions  Dosage  Frequency  Start Date  End Date  Duration  
Status

 

 Glimepiride 4 MG  Orally Once a day  1 tablet with breakfast or the first main 
meal of the day  24h       90 days  Active

 

 Tramadol-Acetaminophen 37.5-325 MG  Orally every 4 hrs  2 tablets as needed  
4h        28 days  Active

 

 Blood Glucose Test Strip Test Strips  subcutaneously Once a day  1 test strip  
24h          Active

 

 Omeprazole 20 MG  Orally Once a day  1 capsule  24h       30 day(s
)  Not-Taking

 

 Alprazolam 0.5 MG  Orally Three times a day  1 tablet  8h        30 days  
Active

 

 Timolol Hemihydrate 0.5 %  Ophthalmic Twice a day  1 drop into affected eye  
12h           Active

 

 Monistat 1 Combo Pack 1200 & 2 MG & %  Vaginal Once a day  use _insert and 
cream  24h          Not-Taking

 

 Wheelchair -     use for mobility             Active

 

 Omeprazole 20  Orally Once a day  1 capsule  24h        30  Active

 

 Metoprolol Tartrate 25     TAKE ONE TABLET BY MOUTH TWICE A DAY           90  
Active

 

 Wheelchair -     use for ambulation  24h  09 May, 2017        Not-Taking

 

 Lisinopril 5 mg  Orally Once a day  1 tablet  24h           Active

 

 Wheelchair -     use for transportation  24h          Not-Taking

 

 ProAir  (90 Base) MCG/ACT  Inhalation every 4-6 hours as needed  2 
puffs as needed           30 days  Active







RESULTS

No Results



PROCEDURES







 Procedure  Date Ordered  Result  Body Site

 

 [facility] VISIT IPPE/AWV  May 15, 2018      

 

 ANNUAL WELLNES VST; PERSNL PPS INIT  May 15, 2018      

 

 SINGLE IMMUNIZATION ADMIN  May 15, 2018      

 

 PT TOBACCO SCREEN RCVD TLK  May 15, 2018      

 

 FALL RISK ASSESSMENT DOCD  May 15, 2018      

 

 TDAP (BOOSTRIX)  May 15, 2018      

 

 NEG SCR D PT NOT ELIG F/U/PLN DOC  May 15, 2018      







INSTRUCTIONS





MEDICATIONS ADMINISTERED

No Known Medications



MEDICAL (GENERAL) HISTORY







 Type  Description  Date

 

 Medical History  type II diabetes   

 

 Medical History  hypertension   

 

 Medical History  Arthritis   

 

 Medical History  skin cancer-scalp   

 

 Surgical History  hysterectomy   

 

 Surgical History  skin cancer removal-scalp   

 

 Hospitalization History  Surgery(s) only   

 

 Hospitalization History  dehydration  2017

 

 Hospitalization History  bronchitis  2107

## 2019-02-08 NOTE — XMS REPORT
[facility]

 Created on: 2017



Sandrita Stoddard

External Reference #: [id number]

: 1934

Sex: Female



Demographics







 Address  [address] [place]  [address]

 

 Preferred Language  Unknown

 

 Marital Status  Unknown

 

 Jew Affiliation  Unknown

 

 Race  Unknown

 

 Ethnic Group  Unknown





Author







 Author  YULISA RANGEL

 

 [facility]

 

 Address  [address] [place]  [address]



 

 Phone  [phone]







Care Team Providers







 Care Team Member Name  Role  Phone

 

 YULISA RANGEL  Unavailable  [phone]







PROBLEMS







 Type  Condition  ICD9-CM Code  XVN00-CM Code  Onset Dates  Condition Status  
SNOMED Code

 

 Problem  Arthritis     M19.90     Active  5666544

 

 Problem  Anxiety     F41.9     Active  66041886

 

 Problem  Essential hypertension     I10     Active  63827813

 

 Problem  Lumbago with sciatica, left side     M54.42     Active  471331702

 

 Problem  Venous insufficiency     I87.2     Active  79854980

 

 Problem  Anemia of chronic disease     D63.8     Active  773860440

 

 Problem  Seasonal allergic rhinitis due to pollen     J30.1     Active  
53961358

 

 Problem  Type 2 diabetes mellitus without complication, without long-term 
current use of insulin     E11.9     Active  797412655

 

 Problem  Chronic kidney disease, stage 3 (moderate)     N18.3     Active  
785360927

 

 Problem  Gastroesophageal reflux disease, esophagitis presence not specified  
   K21.9     Active  652137934







ALLERGIES

No Information



SOCIAL HISTORY

Never Assessed



PLAN OF CARE





VITAL SIGNS





MEDICATIONS

Unknown Medications



RESULTS

No Results



PROCEDURES

No Known procedures



IMMUNIZATIONS

No Known Immunizations



MEDICAL (GENERAL) HISTORY







 Type  Description  Date

 

 Medical History  type II diabetes   

 

 Medical History  hypertension   

 

 Medical History  Arthritis   

 

 Medical History  skin cancer-scalp   

 

 Surgical History  hysterectomy   

 

 Surgical History  skin cancer removal-scalp   

 

 Hospitalization History  Surgery(s) only   

 

 Hospitalization History  dehydration  2017

 

 Hospitalization History  bronchitis  2107

## 2019-02-08 NOTE — XMS REPORT
[facility]

 Created on: 2018



Richi Sandrita

External Reference #: [id number]

: 1934

Sex: Female



Demographics







 Address  [address] [place]  [address]

 

 Preferred Language  Unknown

 

 Marital Status  Unknown

 

 Yarsani Affiliation  Unknown

 

 Race  Unknown

 

 Ethnic Group  Unknown





Author







 Author  YULISA RANGEL

 

 [facility]

 

 Address  [address] [place]  [address]



 

 Phone  [phone]







Care Team Providers







 Care Team Member Name  Role  Phone

 

 YULISA RANGEL  Unavailable  [phone]







PROBLEMS







 Type  Condition  ICD9-CM Code  GBR03-HI Code  Onset Dates  Condition Status  
SNOMED Code

 

 Problem  Essential hypertension     I10     Active  63702088

 

 Problem  Type 2 diabetes mellitus without complication, without long-term 
current use of insulin     E11.9     Active  669604611

 

 Problem  Anxiety     F41.9     Active  43077876

 

 Problem  Lumbago with sciatica, left side     M54.42     Active  617753220

 

 Problem  Arthritis     M19.90     Active  9489996

 

 Problem  Iron deficiency anemia, unspecified iron deficiency anemia type     
D50.9     Active  32830918

 

 Problem  Venous insufficiency     I87.2     Active  77230132

 

 Problem  Gastroesophageal reflux disease, esophagitis presence not specified  
   K21.9     Active  322210440

 

 Problem  Seasonal allergic rhinitis due to pollen     J30.1     Active  
61082835

 

 Problem  Anemia of chronic disease     D63.8     Active  463048855

 

 Problem  Chronic kidney disease, stage 3 (moderate)     N18.3     Active  
144019547







ALLERGIES

No Information



ENCOUNTERS







 Encounter  Location  Date  Diagnosis

 

 [place] [address] [place] [id number] [place] [address] 01891-
1322     

 

 [place] [id number] [place] [address] 45793-
9650    Arthritis M19.90 and Essential hypertension I10

 

 [facility]  [id number] [place] [address] 70845-
4548  15 May, 2018   

 

 [place] [address] [place] [id number] [place] [address] 61717-
9619  15 May, 2018   

 

 [place] [id number] [place] [address] 21534-
6861  15 May, 2018  Medicare annual wellness visit, initial Z00.00 ; Type 2 
diabetes mellitus without complication, without long-term current use of 
insulin E11.9 ; Chronic kidney disease, stage 3 (moderate) N18.3 ; Essential 
hypertension I10 ; Gastroesophageal reflux disease, esophagitis presence not 
specified K21.9 ; Anxiety F41.9 ; Arthritis M19.90 and Encounter for 
immunization Z23

 

 [facility]  [address] [address]-
1191  03 May, 2018  Essential hypertension I10

 

 [place] [address] [address]2001-
9679    Arthritis M19.90

 

 [place] [address] 08645-
1559     

 

 [place] [address] [address]507-
0325     

 

 [place] [address] 71216-
6040    Essential hypertension I10

 

 [facility]  [address] [address]
[phone]  27 Mar, 2018  Dysuria R30.0 and Vaginal candida B37.3

 

 [place] [address] 90093-
2668  08 Mar, 2018  Arthritis M19.90

 

 [place] [address] 20444-
3849     

 

 [place] [address] [address]14-
1597    Type 2 diabetes mellitus without complication, without long-
term current use of insulin E11.9 ; Lumbago with sciatica, left side M54.42 ; 
Arthritis M19.90 ; Iron deficiency anemia, unspecified iron deficiency anemia 
type D50.9 and BMI 40.0-44.9, adult Z68.41

 

 [place] [address] 64691-
1003     

 

 [id number] [place] [address] 13056-
8378     

 

 [facility]  [id number] [place] [address] 74751-
5354    Arthritis M19.90 and Anxiety F41.9

 

 [facility]  [id number] [place] [address] 03862-
6235     

 

 [facility]  [address] 29645-
8138  18 Dec, 2017  Anxiety F41.9

 

 [facility]  [id number] [place] [address] 33221-
1479     

 

 [facility]  [id number] [place] [address] 00449-
0602     

 

 [facility]  [id number] [place] [address] 42538-
1492    Vaginal yeast infection B37.3

 

 [facility]  [id number] [place] [address] 36915-
6574    Anxiety F41.9

 

 [facility]  [id number] [place] [address] 06615-
3178    Type 2 diabetes mellitus without complication, without long-
term current use of insulin E11.9

 

 [facility]  [id number] [place] [address] 74012-
4507     

 

 [facility]  [id number] [place] [address] 64385-
1734     

 

 [facility]  [id number] [place] [address] 59284-
2682  24 Oct, 2017  Arthritis M19.90

 

 [facility]  [id number] [place] [address] 16679-
4920  16 Oct, 2017   

 

 [facility]  [id number] [place] [address] 97452-
5482  04 Oct, 2017   

 

 [facility]  [id number] [place] [address] 92754-
5859  05 Sep, 2017   

 

 [place] [address] 80070-
8763  25 Aug, 2017  Venous insufficiency I87.2 and Venous stasis dermatitis of 
right lower extremity I87.2

 

 [place] [id number] [place] [address] 85735-
8338  21 Aug, 2017  Essential hypertension I10

 

 [place] [address] 88479-
8123     

 

 [address] 96171-
1028    Type 2 diabetes mellitus without complication, without long-
term current use of insulin E11.9 and Essential hypertension I10

 

 [place] [id number] [place] [address] 40278-
5730    Essential hypertension I10 and Type 2 diabetes mellitus 
without complication, without long-term current use of insulin E11.9

 

 [place] [id number] [place] [address] 73259-
3481    Chronic kidney disease, stage 3 (moderate) N18.3 ; Anemia 
of chronic disease D63.8 and Type 2 diabetes mellitus without complication, 
without long-term current use of insulin E11.9

 

 [place] [id number] [place] [address] 61045-
0404    Type 2 diabetes mellitus without complication, without long-
term current use of insulin E11.9 ; Iron deficiency anemia secondary to 
inadequate dietary iron intake D50.8 ; Arthritis M19.90 and Lumbago with 
sciatica, left side M54.42

 

 [place] [id number] [place] [address] 98455-
8520    Arthritis M19.90 and Anxiety F41.9

 

 [place] [id number] [place] [address] 31939-
3923    Type 2 diabetes mellitus without complication, without long-
term current use of insulin E11.9 and Essential hypertension I10

 

 [facility]  [address] [place] [address] 88890-
4812  22 May, 2017  Anxiety F41.9

 

 [place] [address] [place] [address] [id number] [place] [address] 43297-
9027  18 May, 2017  Monilial rash B37.2

 

 [place] [address] [place] [id number] [place] [address] 98577-
9309  16 May, 2017   

 

 [facility]  [id number] [place] [address] 93830-
1742  15 May, 2017   

 

 [place] [address] 74348-
1512  11 May, 2017   

 

 [place] [address] [place] [address] [id number] [place] [address] 08747-
8462  11 May, 2017  Gastroesophageal reflux disease, esophagitis presence not 
specified K21.9

 

 [place] [address] [place] [id number] [place] [address] 41421-
4170  09 May, 2017  Arthritis M19.90

 

 [place] [address] [place] [address] [id number] [place] [address] 21233-
8005  09 May, 2017  Anxiety F41.9 ; Arthritis M19.90 and Essential hypertension 
I10

 

 [place] [address] [place] [address] [id number] [place] [address] 98204-
5759  05 May, 2017  Essential hypertension I10 and Anxiety F41.9

 

 [place] [address] [place] [address] [id number] [place] [address] 16500-
5750     

 

 [place] [address] [place] [address] 79387-
8450    Arthritis M19.90 and Anxiety F41.9

 

 [place] [address] [place] [address] 93533-
0416    Type 2 diabetes mellitus without complication, without long-
term current use of insulin E11.9 ; Cellulitis of right lower extremity L03.115 
and Arthritis M19.90

 

 [place] [id number] [place] [address] 47950-
9317  28 Mar, 2017   

 

 [place] [address] 17443-
0906  20 Mar, 2017  Type 2 diabetes mellitus without complication, without long-
term current use of insulin E11.9 ; Bronchitis J40 and Arthritis M19.90

 

 [place] [address] 16785-
1154  16 Mar, 2017   

 

 [place] [address] [address]08-
4972    Anxiety F41.9

 

 [facility] [address] [address]
-5696    Dysuria R30.0 ; Acute cystitis with hematuria N30.01 and 
Vaginal yeast infection B37.3

 

 [address] 19242-
9481    Arthritis M19.90

 

 [facility] [place] [address] [address]
-4262    Strep pharyngitis J02.0 and Sore throat J02.9

 

 [place] [address] [place] [id number] [place] [address] 39808-
8969     

 

 [place] [address] [place] [address] [id number] [place] [address] 83158-
6621    Type 2 diabetes mellitus without complication, without long-
term current use of insulin E11.9

 

 [place] [address] [place] [id number] [place] [address] 18700-
2228  14 Dec, 2016   

 

 [address] 9[address]01-
4081  13 Dec, 2016  Seasonal allergic rhinitis due to pollen J30.1

 

 [facility] [place] [id number] [place] [address] 12748
-8855  09 Dec, 2016  Impacted cerumen of right ear H61.21 and Seasonal allergic 
rhinitis due to pollen J30.1

 

 [facility]  [id number] [place] [address] 91256-
0793  09 Dec, 2016   

 

 [facility]  [id number] [place] [address] 62955-
7488  05 Dec, 2016   

 

 [facility]  [id number] [place] [address] 62262-
2855    Type 2 diabetes mellitus without complication, without long-
term current use of insulin E11.9 ; Anxiety F41.9 ; Essential hypertension I10 
and Encounter for immunization Z23

 

 [facility]  [id number] [place] [address] 81378-
6470    Essential hypertension I10

 

 [facility]  [id number] [place] [address] 57145-
6557     

 

 [facility]  [id number] [place] [address] 57032-
0049     

 

 [facility]  [id number] [place] [address] 21110-
3646    Essential hypertension I10

 

 [facility]  [id number] [place] [address] 03809-
2462     

 

 [facility]  [id number] [place] [address] 51669-
7661  23 Sep, 2016   

 

 [facility]  [id number] [place] [address] 01446-
6363  01 Sep, 2016   

 

 [facility]  [id number] [place] [address] 86950-
8926  30 Aug, 2016   

 

 [facility]  [id number] [place] [address] 77792-
5225  24 Aug, 2016   

 

 [facility]  [id number] [place] [address] 00181-
5340  22 Aug, 2016   

 

 [facility]  [id number] [place] [address] 86247-
7480  22 Aug, 2016  Type 2 diabetes mellitus without complication, without long-
term current use of insulin E11.9 ; Essential hypertension I10 ; Acute non-
recurrent maxillary sinusitis J01.00 ; Arthritis M19.90 ; Lumbago with sciatica
, left side M54.42 ; Other chronic pain G89.29 and Anxiety F41.9







IMMUNIZATIONS

No Known Immunizations



SOCIAL HISTORY

Never Assessed



REASON FOR VISIT

Xanax, utracet- 



PLAN OF CARE





VITAL SIGNS





MEDICATIONS







 Medication  Instructions  Dosage  Frequency  Start Date  End Date  Duration  
Status

 

 Alprazolam 0.5 mg  Orally Three times a day  1 tablet  8h           Active

 

 Tramadol-Acetaminophen 37.5-325 MG  Orally every 4 hrs  2 tablets as needed  
4h        28 days  Active







RESULTS

No Results



PROCEDURES

No Known procedures



INSTRUCTIONS





MEDICATIONS ADMINISTERED

No Known Medications



MEDICAL (GENERAL) HISTORY







 Type  Description  Date

 

 Medical History  type II diabetes   

 

 Medical History  hypertension   

 

 Medical History  Arthritis   

 

 Medical History  skin cancer-scalp   

 

 Surgical History  hysterectomy   

 

 Surgical History  skin cancer removal-scalp   

 

 Hospitalization History  Surgery(s) only   

 

 Hospitalization History  dehydration  2017

 

 Hospitalization History  bronchitis  2107

## 2019-02-08 NOTE — XMS REPORT
[facility]

 Created on: 10/05/2017



Richi Sandrita

External Reference #: [id number]

: 1934

Sex: Female



Demographics







 Address  [address] [place]  [address]

 

 Preferred Language  Unknown

 

 Marital Status  Unknown

 

 Zoroastrian Affiliation  Unknown

 

 Race  Unknown

 

 Ethnic Group  Unknown





Author







 Author  YULISA RANGEL

 

 [facility]

 

 Address  [address] [place]  [address]



 

 Phone  [phone]







Care Team Providers







 Care Team Member Name  Role  Phone

 

 YULISA RANGEL  Unavailable  [phone]







PROBLEMS







 Type  Condition  ICD9-CM Code  FMN34-WF Code  Onset Dates  Condition Status  
SNOMED Code

 

 Problem  Arthritis     M19.90     Active  5572845

 

 Problem  Anxiety     F41.9     Active  86566614

 

 Problem  Essential hypertension     I10     Active  62368729

 

 Problem  Lumbago with sciatica, left side     M54.42     Active  874161295

 

 Problem  Venous insufficiency     I87.2     Active  79839427

 

 Problem  Anemia of chronic disease     D63.8     Active  635083696

 

 Problem  Seasonal allergic rhinitis due to pollen     J30.1     Active  
23920230

 

 Problem  Type 2 diabetes mellitus without complication, without long-term 
current use of insulin     E11.9     Active  766709002

 

 Problem  Chronic kidney disease, stage 3 (moderate)     N18.3     Active  
109966248

 

 Problem  Gastroesophageal reflux disease, esophagitis presence not specified  
   K21.9     Active  413032892







ALLERGIES

No Information



SOCIAL HISTORY

Never Assessed



PLAN OF CARE





VITAL SIGNS





MEDICATIONS

Unknown Medications



RESULTS

No Results



PROCEDURES

No Known procedures



IMMUNIZATIONS

No Known Immunizations



MEDICAL (GENERAL) HISTORY







 Type  Description  Date

 

 Medical History  type II diabetes   

 

 Medical History  hypertension   

 

 Medical History  Arthritis   

 

 Medical History  skin cancer-scalp   

 

 Surgical History  hysterectomy   

 

 Surgical History  skin cancer removal-scalp   

 

 Hospitalization History  Surgery(s) only   

 

 Hospitalization History  dehydration  2017

 

 Hospitalization History  bronchitis  2107

## 2019-02-08 NOTE — XMS REPORT
[facility]

 Created on: 2017



Richi Sandrita

External Reference #: [id number]

: 1934

Sex: Female



Demographics







 Address  [address] [place]  [address]

 

 Preferred Language  Unknown

 

 Marital Status  Unknown

 

 Congregation Affiliation  Unknown

 

 Race  Unknown

 

 Ethnic Group  Unknown





Author







 Author  PATRICIA RODRIGUEZ

 

 Organization  [facility]

 

 Address  [address]  [place]  [address]



 

 Phone  [phone]







Care Team Providers







 Care Team Member Name  Role  Phone

 

 JENNIFER  PATRICIA  Unavailable  [phone]







PROBLEMS







 Type  Condition  ICD9-CM Code  XSK21-CG Code  Onset Dates  Condition Status  
SNOMED Code

 

 Problem  Arthritis     M19.90     Active  0227670

 

 Problem  Anxiety     F41.9     Active  93973888

 

 Problem  Essential hypertension     I10     Active  82477799

 

 Problem  Lumbago with sciatica, left side     M54.42     Active  473542436

 

 Problem  Venous insufficiency     I87.2     Active  69215572

 

 Problem  Anemia of chronic disease     D63.8     Active  731891083

 

 Problem  Seasonal allergic rhinitis due to pollen     J30.1     Active  
25021253

 

 Problem  Type 2 diabetes mellitus without complication, without long-term 
current use of insulin     E11.9     Active  746787045

 

 Problem  Chronic kidney disease, stage 3 (moderate)     N18.3     Active  
314541408

 

 Problem  Gastroesophageal reflux disease, esophagitis presence not specified  
   K21.9     Active  286897300







ALLERGIES

No Known Allergies



SOCIAL HISTORY

No smoking Hx information available



PLAN OF CARE





VITAL SIGNS





MEDICATIONS







 Medication  Instructions  Dosage  Frequency  Start Date  End Date  Duration  
Status

 

 Tramadol-Acetaminophen 37.5-325 MG  Orally every 4 hrs  2 tablets as needed  
4h           Active







RESULTS

No Results



PROCEDURES

No Known procedures



IMMUNIZATIONS

No Known Immunizations

## 2019-02-08 NOTE — XMS REPORT
[facility]

 Created on: 2018



Sandrita Stoddard

External Reference #: [id number]

: 1934

Sex: Female



Demographics







 Address  [address] [place]  [address]

 

 Preferred Language  Unknown

 

 Marital Status  Unknown

 

 Sabianist Affiliation  Unknown

 

 Race  Unknown

 

 Ethnic Group  Unknown





Author







 Author  YULISA RANGEL

 

 Organization  [facility]

 

 Address  [address] [place]  [address]



 

 Phone  [phone]







Care Team Providers







 Care Team Member Name  Role  Phone

 

 YULISA RANGEL  Unavailable  [phone]







PROBLEMS







 Type  Condition  ICD9-CM Code  BVD88-SZ Code  Onset Dates  Condition Status  
SNOMED Code

 

 Problem  Essential hypertension     I10     Active  50327619

 

 Problem  Type 2 diabetes mellitus without complication, without long-term 
current use of insulin     E11.9     Active  488236419

 

 Problem  Anxiety     F41.9     Active  74063510

 

 Problem  Lumbago with sciatica, left side     M54.42     Active  494597306

 

 Problem  Arthritis     M19.90     Active  3335234

 

 Problem  Iron deficiency anemia, unspecified iron deficiency anemia type     
D50.9     Active  92894876

 

 Problem  Venous insufficiency     I87.2     Active  24263999

 

 Problem  Gastroesophageal reflux disease, esophagitis presence not specified  
   K21.9     Active  324140934

 

 Problem  Seasonal allergic rhinitis due to pollen     J30.1     Active  
67558342

 

 Problem  Anemia of chronic disease     D63.8     Active  433810549

 

 Problem  Chronic kidney disease, stage 3 (moderate)     N18.3     Active  
784385020







ALLERGIES

No Information



ENCOUNTERS







 Encounter  Location  Date  Diagnosis

 

 [place] [address] [place] [address] 75482-
7007  15 May, 2018   

 

 [facility]  [id number] [place] [address] 75525-
3098  15 May, 2018   

 

 [facility]  [address] [place] [address] 29355-
1411  15 May, 2018  Medicare annual wellness visit, initial Z00.00 ; Type 2 
diabetes mellitus without complication, without long-term current use of 
insulin E11.9 ; Chronic kidney disease, stage 3 (moderate) N18.3 ; Essential 
hypertension I10 ; Gastroesophageal reflux disease, esophagitis presence not 
specified K21.9 ; Anxiety F41.9 ; Arthritis M19.90 and Encounter for 
immunization Z23

 

 [place] [address] [place] [address] 20593-
9793  03 May, 2018  Essential hypertension I10

 

 [facility]  [address] [address]-
2903    Arthritis M19.90

 

 [facility]  [id number] [place] [address] 54943-
3129     

 

 [facility]  [address] 80648-
6425     

 

 [facility]  [id number] [place] [address] 73330-
0004    Essential hypertension I10

 

 [facility]  [id number] [place] [address] 76628
[phone]  27 Mar, 2018  Dysuria R30.0 and Vaginal candida B37.3

 

 [place] [address] 25166-
8679  08 Mar, 2018  Arthritis M19.90

 

 [facility]  [id number] [place] [address] 18923-
0660     

 

 [place] [address] [place] [id number] [place] [address] 64452-
3730    Type 2 diabetes mellitus without complication, without long-
term current use of insulin E11.9 ; Lumbago with sciatica, left side M54.42 ; 
Arthritis M19.90 ; Iron deficiency anemia, unspecified iron deficiency anemia 
type D50.9 and BMI 40.0-44.9, adult Z68.41

 

 [facility]  [id number] [place] [address] 33978-
7795     

 

 [place] [address] [place] [id number] [place] [address] 53077-
5230     

 

 [place] [address] [place] [id number] [place] [address] 83450-
3028    Arthritis M19.90 and Anxiety F41.9

 

 [place] [address] [place] [id number] [place] [address] 61572-
6095     

 

 [facility]  [id number] [place] [address] 23977-
2934  18 Dec, 2017  Anxiety F41.9

 

 [facility]  [id number] [place] [address] 44075-
7548     

 

 [facility]  [address] 82196-
7845     

 

 [facility]  [id number] [place] [address] 81144-
4615    Vaginal yeast infection B37.3

 

 [facility]  [id number] [place] [address] 00281-
7689    Anxiety F41.9

 

 [facility]  [id number] [place] [address] 63943-
3771    Type 2 diabetes mellitus without complication, without long-
term current use of insulin E11.9

 

 [facility]  [id number] [place] [address] 82852-
3551     

 

 [facility]  [id number] [place] [address] 79700-
4190     

 

 [facility]  [id number] [place] [address] 74970-
9600  24 Oct, 2017  Arthritis M19.90

 

 [facility]  [id number] [place] [address] 31460-
1533  16 Oct, 2017   

 

 [facility]  [id number] [place] [address] 77017-
2820  04 Oct, 2017   

 

 [facility]  [id number] [place] [address] 67180-
6469  05 Sep, 2017   

 

 [facility]  [address] [place] [address] 54723-
8258  25 Aug, 2017  Venous insufficiency I87.2 and Venous stasis dermatitis of 
right lower extremity I87.2

 

 [place] [address] [place] [address] 86014-
6847  21 Aug, 2017  Essential hypertension I10

 

 [place] [address] [place] [id number] [place] [address] 35667-
6812     

 

 [place] [address] [place] [id number] [place] [address] 34149-
6656    Type 2 diabetes mellitus without complication, without long-
term current use of insulin E11.9 and Essential hypertension I10

 

 [place] [address] [place] [id number] [place] [address] 47270-
9740    Essential hypertension I10 and Type 2 diabetes mellitus 
without complication, without long-term current use of insulin E11.9

 

 [place] [address] [place] [id number] [place] [address] 43086-
1186    Chronic kidney disease, stage 3 (moderate) N18.3 ; Anemia 
of chronic disease D63.8 and Type 2 diabetes mellitus without complication, 
without long-term current use of insulin E11.9

 

 [place] [address] [place] [id number] [place] [address] 55622-
0879    Type 2 diabetes mellitus without complication, without long-
term current use of insulin E11.9 ; Iron deficiency anemia secondary to 
inadequate dietary iron intake D50.8 ; Arthritis M19.90 and Lumbago with 
sciatica, left side M54.42

 

 [place] [address] [place] [address] 17535-
5067    Arthritis M19.90 and Anxiety F41.9

 

 [place] [address] [place] [address] 42450-
6600    Type 2 diabetes mellitus without complication, without long-
term current use of insulin E11.9 and Essential hypertension I10

 

 [place] [address] [place] [address] 12152-
8798  22 May, 2017  Anxiety F41.9

 

 [place] [address] [place] [id number] [place] [address] 80474-
9579  18 May, 2017  Monilial rash B37.2

 

 [facility]  [id number] [place] [address] 91090-
9705  16 May, 2017   

 

 [facility]  [id number] [place] [address] 42168-
1926  15 May, 2017   

 

 [facility]  [id number] [place] [address] 36911-
0462  11 May, 2017   

 

 [facility]  [id number] [place] [address] 37436-
1362  11 May, 2017  Gastroesophageal reflux disease, esophagitis presence not 
specified K21.9

 

 [place] [address] [place] [id number] [place] [address] 49898-
8975  09 May, 2017  Arthritis M19.90

 

 [place] [address] [place] [id number] [place] [address] 67747-
6001  09 May, 2017  Anxiety F41.9 ; Arthritis M19.90 and Essential hypertension 
I10

 

 [place] [address] [place] [id number] [place] [address] 47634-
8623  05 May, 2017  Essential hypertension I10 and Anxiety F41.9

 

 [place] [address] [place] [id number] [place] [address] 93201-
4106     

 

 [place] [address] [id number] [place] [address] 18433-
2335    Arthritis M19.90 and Anxiety F41.9

 

 [place] [address] [place] [id number] [place] [address] 93460-
7628    Type 2 diabetes mellitus without complication, without long-
term current use of insulin E11.9 ; Cellulitis of right lower extremity L03.115 
and Arthritis M19.90

 

 [place] [address] [place] [id number] [place] [address] 14722-
2836  28 Mar, 2017   

 

 [place] [id number] [place] [address] 96695-
6653  20 Mar, 2017  Type 2 diabetes mellitus without complication, without long-
term current use of insulin E11.9 ; Bronchitis J40 and Arthritis M19.90

 

 [place] [address] [place] [id number] [place] [address] 66627-
2439  16 Mar, 2017   

 

 [place] [address] [place] [id number] [place] [address] 32374-
3741    Anxiety F41.9

 

 [facility] [place] [address] 17465
[phone]    Dysuria R30.0 ; Acute cystitis with hematuria N30.01 and 
Vaginal yeast infection B37.3

 

 [place] [address] [place] [id number] [place] [address] 68555-
0516    Arthritis M19.90

 

 [facility] [place] [address] [address] [id number] [place] [address] 05183
-[phone]    Strep pharyngitis J02.0 and Sore throat J02.9

 

 [place] [address] 59511-
6508     

 

 [place] [address] [place] [id number] [place] [address] 71964-
8891    Type 2 diabetes mellitus without complication, without long-
term current use of insulin E11.9

 

 [place] [address] [place] [id number] [place] [address] 44411-
0624  14 Dec, 2016   

 

 [place] [address] [place] [id number] [place] [address] 52376-
3017  13 Dec, 2016  Seasonal allergic rhinitis due to pollen J30.1

 

 [facility] [place] [address] [place] [id number] [place] [address] 34183
-[phone]  09 Dec, 2016  Impacted cerumen of right ear H61.21 and Seasonal allergic 
rhinitis due to pollen J30.1

 

 [place] [address] [place] [id number] [place] [address] 89311-
9516  09 Dec, 2016   

 

 [place] [address] 59082-
7103  05 Dec, 2016   

 

 [facility]  [address], [place] [address]-
3939    Type 2 diabetes mellitus without complication, without long-
term current use of insulin E11.9 ; Anxiety F41.9 ; Essential hypertension I10 
and Encounter for immunization Z23

 

 [facility]  [id number] [place] [address] 82955-
1423    Essential hypertension I10

 

 [facility]  [id number] [place] [address] 12615-
5139     

 

 [facility]  [id number] [place] [address] 74172-
2036     

 

 [facility]  [address] [place] [address] 77880-
5797    Essential hypertension I10

 

 [facility]  [id number] [place] [address] 49703-
0808     

 

 [facility]  [id number] [place] [address] 33988-
7217  23 Sep, 2016   

 

 [facility]  [id number] [place] [address] 27050-
2662  01 Sep, 2016   

 

 [facility]  [id number] [place] [address] 30058-
3954  30 Aug, 2016   

 

 [facility]  [address] [place] [address] 46093-
6446  24 Aug, 2016   

 

 [facility]  [id number] [place] [address] 05673-
8166  22 Aug, 2016   

 

 [facility]  [id number] [place] [address] 70337-
1369  22 Aug, 2016  Type 2 diabetes mellitus without complication, without long-
term current use of insulin E11.9 ; Essential hypertension I10 ; Acute non-
recurrent maxillary sinusitis J01.00 ; Arthritis M19.90 ; Lumbago with sciatica
, left side M54.42 ; Other chronic pain G89.29 and Anxiety F41.9







IMMUNIZATIONS

No Known Immunizations



SOCIAL HISTORY

Never Assessed



REASON FOR VISIT

Medication request



PLAN OF CARE





VITAL SIGNS





MEDICATIONS







 Medication  Instructions  Dosage  Frequency  Start Date  End Date  Duration  
Status

 

 Monistat 1 Combo Pack 1200 & 2 MG & %  Vaginal Once a day  use insert and 
cream  24h          Active







RESULTS

No Results



PROCEDURES

No Known procedures



INSTRUCTIONS





MEDICATIONS ADMINISTERED

No Known Medications



MEDICAL (GENERAL) HISTORY







 Type  Description  Date

 

 Medical History  type II diabetes   

 

 Medical History  hypertension   

 

 Medical History  Arthritis   

 

 Medical History  skin cancer-scalp   

 

 Surgical History  hysterectomy   

 

 Surgical History  skin cancer removal-scalp   

 

 Hospitalization History  Surgery(s) only   

 

 Hospitalization History  dehydration  2017

 

 Hospitalization History  bronchitis  2107

## 2019-02-08 NOTE — XMS REPORT
[facility]

 Created on: 2017



Sandrita Stoddard

External Reference #: [id number]

: 1934

Sex: Female



Demographics







 Address  [address] [place]  [address]

 

 Preferred Language  Unknown

 

 Marital Status  Unknown

 

 Yarsani Affiliation  Unknown

 

 Race  Unknown

 

 Ethnic Group  Unknown





Author







 Author  YULISA RANGEL

 

 [facility]

 

 Address  [address] [place]  [address]



 

 Phone  [phone]







Care Team Providers







 Care Team Member Name  Role  Phone

 

 YULISA RANGEL  Unavailable  [phone]







PROBLEMS







 Type  Condition  ICD9-CM Code  GFU70-RT Code  Onset Dates  Condition Status  
SNOMED Code

 

 Problem  Type 2 diabetes mellitus without complication, without long-term 
current use of insulin     E11.9     Active  586868125

 

 Problem  Essential hypertension     I10     Active  23109658

 

 Problem  Anxiety     F41.9     Active  64866024

 

 Problem  Arthritis     M19.90     Active  3344899

 

 Problem  Lumbago with sciatica, left side     M54.42     Active  952122773







ALLERGIES

Unknown Allergies



SOCIAL HISTORY

No smoking Hx information available



PLAN OF CARE





VITAL SIGNS





MEDICATIONS

Unknown Medications



RESULTS

No Results



PROCEDURES

No Known procedures



IMMUNIZATIONS

No Known Immunizations

## 2019-02-08 NOTE — XMS REPORT
[facility]

 Created on: 2017



Sandrita Stoddard

External Reference #: [id number]

: 1934

Sex: Female



Demographics







 Address  [address] [place]  [address]

 

 Preferred Language  Unknown

 

 Marital Status  Unknown

 

 Pentecostal Affiliation  Unknown

 

 Race  Unknown

 

 Ethnic Group  Unknown





Author







 Author  YULISA RANGEL

 

 [facility]

 

 Address  [address] [place]  [address]



 

 Phone  [phone]







Care Team Providers







 Care Team Member Name  Role  Phone

 

 YULISA RANGEL  Unavailable  [phone]







PROBLEMS







 Type  Condition  ICD9-CM Code  MDA53-KB Code  Onset Dates  Condition Status  
SNOMED Code

 

 Problem  Arthritis     M19.90     Active  1972688

 

 Problem  Anxiety     F41.9     Active  19511829

 

 Problem  Essential hypertension     I10     Active  30578597

 

 Problem  Lumbago with sciatica, left side     M54.42     Active  876401302

 

 Problem  Venous insufficiency     I87.2     Active  46715970

 

 Problem  Anemia of chronic disease     D63.8     Active  139785065

 

 Problem  Seasonal allergic rhinitis due to pollen     J30.1     Active  
51089876

 

 Problem  Type 2 diabetes mellitus without complication, without long-term 
current use of insulin     E11.9     Active  502215455

 

 Problem  Chronic kidney disease, stage 3 (moderate)     N18.3     Active  
509878808

 

 Problem  Gastroesophageal reflux disease, esophagitis presence not specified  
   K21.9     Active  098022676







ALLERGIES

No Known Allergies



SOCIAL HISTORY

Never Assessed



PLAN OF CARE







 Activity  Details









  









 Follow Up  Regular appt Reason:







VITAL SIGNS







 Height  56 in  2017

 

 Weight  175.4 lbs  2017

 

 Temperature  97.4 degrees Fahrenheit  2017

 

 Heart Rate  72 bpm  2017

 

 Respiratory Rate  20   2017

 

 BMI  39.32 kg/m2  2017

 

 Blood pressure systolic  128 mmHg  2017

 

 Blood pressure diastolic  58 mmHg  2017







MEDICATIONS







 Medication  Instructions  Dosage  Frequency  Start Date  End Date  Duration  
Status

 

 ProAir  (90 Base) MCG/ACT  Inhalation every 4-6 hours as needed  2 
puffs as needed           30 days  Active

 

 Metoprolol Tartrate 25 MG  Orally Twice a day  1 tablet with food  12h        
   Active

 

 Acetaminophen 500 MG  Orally every 6 hrs  2 capsules as needed  6h           
Active

 

 Glimepiride 2 MG  Orally Once a day  1  24h       30 days  Active

 

 Aspir-81 81 MG  Orally Once a day  1 tablet  24h           Active

 

 Metformin HCl 500  Orally Twice a day  1 tablet with meals  12h        30  
Active

 

 Lisinopril 5 mg  Orally Once a day  1 tablet  24h           Active

 

 Timolol 0.5 %  Ophthalmic Twice a day  1 drop into affected eye  12h           
Active

 

 Tramadol-Acetaminophen 37.5-325 MG  Orally every 4 hrs  2 tablets as needed  
4h        28 days  Active

 

 Alprazolam 0.5 MG  Orally Three times a day  1 tablet  8h        30 days  
Active







RESULTS

No Results



PROCEDURES







 Procedure  Date Ordered  Result  Body Site

 

 [facility] VISIT ESTABLISHED PATIENT  May 05, 2017      







IMMUNIZATIONS

No Known Immunizations



MEDICAL (GENERAL) HISTORY







 Type  Description  Date

 

 Medical History  type II diabetes   

 

 Medical History  hypertension   

 

 Medical History  Arthritis   

 

 Medical History  skin cancer-scalp   

 

 Surgical History  hysterectomy   

 

 Surgical History  skin cancer removal-scalp   

 

 Hospitalization History  Surgery(s) only   

 

 Hospitalization History  dehydration  2017

 

 Hospitalization History  bronchitis  2107

## 2019-02-08 NOTE — XMS REPORT
[facility]

 Created on: 05/15/2018



Richi Sandrita

External Reference #: [id number]

: 1934

Sex: Female



Demographics







 Address  [address] [place]  [address]

 

 Preferred Language  Unknown

 

 Marital Status  Unknown

 

 Advent Affiliation  Unknown

 

 Race  Unknown

 

 Ethnic Group  Unknown





Author







 Author  YULISA RANGEL

 

 [facility]

 

 Address  [address] [place]  [address]



 

 Phone  [phone]







Care Team Providers







 Care Team Member Name  Role  Phone

 

 YULISA RANGEL  Unavailable  [phone]







PROBLEMS







 Type  Condition  ICD9-CM Code  STP80-OK Code  Onset Dates  Condition Status  
SNOMED Code

 

 Problem  Essential hypertension     I10     Active  56603003

 

 Problem  Type 2 diabetes mellitus without complication, without long-term 
current use of insulin     E11.9     Active  573557529

 

 Problem  Anxiety     F41.9     Active  47727815

 

 Problem  Lumbago with sciatica, left side     M54.42     Active  984180650

 

 Problem  Arthritis     M19.90     Active  5704258

 

 Problem  Iron deficiency anemia, unspecified iron deficiency anemia type     
D50.9     Active  69329985

 

 Problem  Venous insufficiency     I87.2     Active  74823409

 

 Problem  Gastroesophageal reflux disease, esophagitis presence not specified  
   K21.9     Active  889692508

 

 Problem  Seasonal allergic rhinitis due to pollen     J30.1     Active  
30311138

 

 Problem  Anemia of chronic disease     D63.8     Active  449706510

 

 Problem  Chronic kidney disease, stage 3 (moderate)     N18.3     Active  
264105096







ALLERGIES

No Information



ENCOUNTERS







 Encounter  Location  Date  Diagnosis

 

 [place] [address] [place] [address]64-
[phone]  15 May, 2018  Medicare annual wellness visit, initial Z00.00

 

 [facility]  [id number] [place] [address] 38466-
[phone]  03 May, 2018  Essential hypertension I10

 

 [facility]  [id number] [place] [address] 46224-
2750    Arthritis M19.90

 

 [facility]  [id number] [place] [address] 50042-
5743     

 

 [place] [address] [place] [id number] [place] [address] 65280-
2566     

 

 [facility]  [id number] [place] [address] 04611-
0548    Essential hypertension I10

 

 [facility]  [id number] [place] [address] [phone]
[phone]  27 Mar, 2018  Dysuria R30.0 and Vaginal candida B37.3

 

 [place] [address] [address]-
8943  08 Mar, 2018  Arthritis M19.90

 

 [facility]  [address] [address]-
2793     

 

 [place] [address] [address]-
8558    Type 2 diabetes mellitus without complication, without long-
term current use of insulin E11.9 ; Lumbago with sciatica, left side M54.42 ; 
Arthritis M19.90 ; Iron deficiency anemia, unspecified iron deficiency anemia 
type D50.9 and BMI 40.0-44.9, adult Z68.41

 

 [place] [address] [id number] [place] [address] 02732-
0943     

 

 [place] [address] [address]-
4666     

 

 [place] [address] [id number] [place] [address] 68092-
9932    Arthritis M19.90 and Anxiety F41.9

 

 [place] [address] [place] [id number] [place] [address] 19007-
9507     

 

 [place] [address] [id number] [place] [address] 25345-
7083  18 Dec, 2017  Anxiety F41.9

 

 [place] [address] [id number] [place] [address] 42222-
1044     

 

 [place] [address] [id number] [place] [address] 27276-
0913     

 

 [place] [address], [place] [address]-
5407  17 2017  Vaginal yeast infection B37.3

 

 [facility]  [id number] [place] [address] 72956-
2495  14 2017  Anxiety F41.9

 

 [facility]  [id number] [place] [address] 04651-
6301    Type 2 diabetes mellitus without complication, without long-
term current use of insulin E11.9

 

 [facility]  [id number] [place] [address] 47672-
9964     

 

 [facility]  [id number] [place] [address] 31493-
9481     

 

 [facility]  [id number] [place] [address] 16137-
0924  24 Oct, 2017  Arthritis M19.90

 

 [facility]  [id number] [place] [address] 22348-
1250  16 Oct, 2017   

 

 [facility]  [id number] [place] [address] 16690-
7060  04 Oct, 2017   

 

 [facility]  [id number] [place] [address] 70486-
9066  05 Sep, 2017   

 

 [facility]  [address] [place] [address] 55356-
8998  25 Aug, 2017  Venous insufficiency I87.2 and Venous stasis dermatitis of 
right lower extremity I87.2

 

 [facility]  [address] [place] [address] 63286-
1902  21 Aug, 2017  Essential hypertension I10

 

 [facility]  [address] [place] [address] 31061-
1559     

 

 [facility]  [address] [place] [address] 60934-
5847    Type 2 diabetes mellitus without complication, without long-
term current use of insulin E11.9 and Essential hypertension I10

 

 [facility]  [address] [id number] [address]-
4014    Essential hypertension I10 and Type 2 diabetes mellitus 
without complication, without long-term current use of insulin E11.9

 

 [place] [address] [place] [id number] [place] [address] 72833-
1275    Chronic kidney disease, stage 3 (moderate) N18.3 ; Anemia 
of chronic disease D63.8 and Type 2 diabetes mellitus without complication, 
without long-term current use of insulin E11.9

 

 [place] [address] [place] [id number] [place] [address] 53306-
3051  14 2017  Type 2 diabetes mellitus without complication, without long-
term current use of insulin E11.9 ; Iron deficiency anemia secondary to 
inadequate dietary iron intake D50.8 ; Arthritis M19.90 and Lumbago with 
sciatica, left side M54.42

 

 [place] [address] [place] [address] 45778-
1575    Arthritis M19.90 and Anxiety F41.9

 

 [place] [address] [place] [id number] [place] [address] 69870-
8828    Type 2 diabetes mellitus without complication, without long-
term current use of insulin E11.9 and Essential hypertension I10

 

 [address] [place] [address] 81275-
6097  22 May, 2017  Anxiety F41.9

 

 [place] [address] [place] [id number] [address]-
4017  18 May, 2017  Monilial rash B37.2

 

 [place] [address] [place] [address] 04809-
9097  16 May, 2017   

 

 [place] [address] [place] [id number] [place] [address] 07692-
6122  15 May, 2017   

 

 [place] [address] [place] [id number] [place] [address] 95026-
1806  11 May, 2017   

 

 [place] [address] [place] [id number] [place] [address] 42466-
7617  11 May, 2017  Gastroesophageal reflux disease, esophagitis presence not 
specified K21.9

 

 [facility]  [id number] [place] [address] 04471-
0152  09 May, 2017  Arthritis M19.90

 

 [facility]  [address] [address]-
5435  09 May, 2017  Anxiety F41.9 ; Arthritis M19.90 and Essential hypertension 
I10

 

 [facility]  [address] [address]-
1299  05 May, 2017  Essential hypertension I10 and Anxiety F41.9

 

 [facility]  [address] [address]-
1926     

 

 [place] [address] [address]-
6846    Arthritis M19.90 and Anxiety F41.9

 

 [facility]  [address] [address]-
0529    Type 2 diabetes mellitus without complication, without long-
term current use of insulin E11.9 ; Cellulitis of right lower extremity L03.115 
and Arthritis M19.90

 

 [facility]  [id number] [place] [address] 85580-
8236  28 Mar, 2017   

 

 [place] [address] [place] [id number] [place] [address] 46343-
1693  20 Mar, 2017  Type 2 diabetes mellitus without complication, without long-
term current use of insulin E11.9 ; Bronchitis J40 and Arthritis M19.90

 

 [facility]  [id number] [place] [address] 08093-
4337  16 Mar, 2017   

 

 [place] [address] [address]-
0297    Anxiety F41.9

 

 [facility]  [id number] [place] [address] 94661
-[phone]    Dysuria R30.0 ; Acute cystitis with hematuria N30.01 and 
Vaginal yeast infection B37.3

 

 [address] [address]-
4723    Arthritis M19.90

 

 [facility] [address] [address]
-[phone]    Strep pharyngitis J02.0 and Sore throat J02.9

 

 [address] [address]-
7870     

 

 [address] [address]-
7469    Type 2 diabetes mellitus without complication, without long-
term current use of insulin E11.9

 

 [address] [address]-
4768  14 Dec, 2016   

 

 [address] [address]-
3839  13 Dec, 2016  Seasonal allergic rhinitis due to pollen J30.1

 

 [facility] [address] [address]
-[phone]  09 Dec, 2016  Impacted cerumen of right ear H61.21 and Seasonal allergic 
rhinitis due to pollen J30.1

 

 [address] [address]-
4755  09 Dec, 2016   

 

 [address] [address]-
4569  05 Dec, 2016   

 

 [address] [address]-
8654    Type 2 diabetes mellitus without complication, without long-
term current use of insulin E11.9 ; Anxiety F41.9 ; Essential hypertension I10 
and Encounter for immunization Z23

 

 [address] [address]-
7219    Essential hypertension I10

 

 [address] [address]-
2710     

 

 [facility]  [address] [place] [address] [address]-
7843     

 

 [facility]  [address] [address]-
5348    Essential hypertension I10

 

 [facility]  [address] [address]-
1534     

 

 [facility]  [id number] [address]-
0623  23 Sep, 2016   

 

 [facility]  [address] [address]-
7956  01 Sep, 2016   

 

 [facility]  [id number] [place] [address] 82889-
0141  30 Aug, 2016   

 

 [facility]  [address] [address]-
8497  24 Aug, 2016   

 

 [facility]  [address] [place] [address]-
0307  22 Aug, 2016   

 

 [facility]  [address] [place] [address]-
1127  22 Aug, 2016  Type 2 diabetes mellitus without complication, without long-
term current use of insulin E11.9 ; Essential hypertension I10 ; Acute non-
recurrent maxillary sinusitis J01.00 ; Arthritis M19.90 ; Lumbago with sciatica
, left side M54.42 ; Other chronic pain G89.29 and Anxiety F41.9







IMMUNIZATIONS

No Known Immunizations



SOCIAL HISTORY

Never Assessed



REASON FOR VISIT

Requests return call



PLAN OF CARE





VITAL SIGNS





MEDICATIONS

Unknown Medications



RESULTS

No Results



PROCEDURES

No Known procedures



INSTRUCTIONS





MEDICATIONS ADMINISTERED

No Known Medications



MEDICAL (GENERAL) HISTORY







 Type  Description  Date

 

 Medical History  type II diabetes   

 

 Medical History  hypertension   

 

 Medical History  Arthritis   

 

 Medical History  skin cancer-scalp   

 

 Surgical History  hysterectomy   

 

 Surgical History  skin cancer removal-scalp   

 

 Hospitalization History  Surgery(s) only   

 

 Hospitalization History  dehydration  2017

 

 Hospitalization History  bronchitis  2107

## 2019-02-08 NOTE — XMS REPORT
[facility]

 Created on: 2018



Richi Sandrita

External Reference #: [id number]

: 1934

Sex: Female



Demographics







 Address  [address] [place]  [address]

 

 Preferred Language  Unknown

 

 Marital Status  Unknown

 

 Holiness Affiliation  Unknown

 

 Race  Unknown

 

 Ethnic Group  Unknown





Author







 Author  YULISA RANGEL

 

 [facility]

 

 Address  [address] [place]  [address]



 

 Phone  [phone]







Care Team Providers







 Care Team Member Name  Role  Phone

 

 YULISA RANGEL  Unavailable  [phone]







PROBLEMS







 Type  Condition  ICD9-CM Code  CJN28-PD Code  Onset Dates  Condition Status  
SNOMED Code

 

 Problem  Essential hypertension     I10     Active  82429310

 

 Problem  Type 2 diabetes mellitus without complication, without long-term 
current use of insulin     E11.9     Active  705690307

 

 Problem  Anxiety     F41.9     Active  72806244

 

 Problem  Lumbago with sciatica, left side     M54.42     Active  319542428

 

 Problem  Arthritis     M19.90     Active  6657763

 

 Problem  Iron deficiency anemia, unspecified iron deficiency anemia type     
D50.9     Active  75779359

 

 Problem  Venous insufficiency     I87.2     Active  48415427

 

 Problem  Gastroesophageal reflux disease, esophagitis presence not specified  
   K21.9     Active  041254643

 

 Problem  Seasonal allergic rhinitis due to pollen     J30.1     Active  
96499347

 

 Problem  Anemia of chronic disease     D63.8     Active  383019213

 

 Problem  Chronic kidney disease, stage 3 (moderate)     N18.3     Active  
335583405







ALLERGIES

No Information



ENCOUNTERS







 Encounter  Location  Date  Diagnosis

 

 [place] [id number] [place] [id number] [place] [address]500-
7740     

 

 [place] [address] [address] [id number] [place] [address] 02475-
5205    Arthritis M19.90 and Essential hypertension I10

 

 [facility]  [id number] [place] [address] 73726-
1859  15 May, 2018   

 

 [place] [address] [place] [id number] [place] [address] 06289-
4038  15 May, 2018   

 

 [place] [address] [address] [id number] [place] [address] 72955-
6280  15 May, 2018  Medicare annual wellness visit, initial Z00.00 ; Type 2 
diabetes mellitus without complication, without long-term current use of 
insulin E11.9 ; Chronic kidney disease, stage 3 (moderate) N18.3 ; Essential 
hypertension I10 ; Gastroesophageal reflux disease, esophagitis presence not 
specified K21.9 ; Anxiety F41.9 ; Arthritis M19.90 and Encounter for 
immunization Z23

 

 [facility]  [address] [address]-
5297  03 May, 2018  Essential hypertension I10

 

 [place] [address] 00765-
7395    Arthritis M19.90

 

 [place] [address] 26606-
1070     

 

 [place] [address] 81353-
5161     

 

 [place] [address] [address]258-
3264    Essential hypertension I10

 

 [facility]  [address] [phone]  27 Mar, 2018  Dysuria R30.0 and Vaginal candida B37.3

 

 [place] [address] 7[address]83-
7742  08 Mar, 2018  Arthritis M19.90

 

 [place] [address] [address]-
3714     

 

 [place] [address] [address]184-
4071    Type 2 diabetes mellitus without complication, without long-
term current use of insulin E11.9 ; Lumbago with sciatica, left side M54.42 ; 
Arthritis M19.90 ; Iron deficiency anemia, unspecified iron deficiency anemia 
type D50.9 and BMI 40.0-44.9, adult Z68.41

 

 [place] [address] [address]98-
1371     

 

 [id number] [place] [address] 17722-
0292     

 

 [facility]  [id number] [place] [address] 25225-
4614    Arthritis M19.90 and Anxiety F41.9

 

 [facility]  [id number] [place] [address] 93020-
2659     

 

 [facility]  [address] 82731-
9191  18 Dec, 2017  Anxiety F41.9

 

 [facility]  [address] [id number] [place] [address] 72140-
2722     

 

 [facility]  [address] [id number] [place] [address] 16279-
1389     

 

 [facility]  [id number] [place] [address] 31205-
7525    Vaginal yeast infection B37.3

 

 [facility]  [address] [id number] [place] [address] 23902-
3423    Anxiety F41.9

 

 [facility]  [address] [id number] [place] [address] 71900-
4690    Type 2 diabetes mellitus without complication, without long-
term current use of insulin E11.9

 

 [facility]  [address] [id number] [place] [address] 57818-
4777     

 

 [facility]  [id number] [place] [address] 13000-
3730     

 

 [facility]  [id number] [place] [address] 99549-
5569  24 Oct, 2017  Arthritis M19.90

 

 [facility]  [id number] [place] [address] 30889-
0648  16 Oct, 2017   

 

 [facility]  [id number] [place] [address] 22639-
0648  04 Oct, 2017   

 

 [facility]  [address] [id number] [place] [address] 66952-
1634  05 Sep, 2017   

 

 [place] [address] [address]-
2803  25 Aug, 2017  Venous insufficiency I87.2 and Venous stasis dermatitis of 
right lower extremity I87.2

 

 [place] [address] [address] [id number] [place] [address] 26300-
1871  21 Aug, 2017  Essential hypertension I10

 

 [place] [address] 03235-
5059     

 

 [address] [address]-
7559    Type 2 diabetes mellitus without complication, without long-
term current use of insulin E11.9 and Essential hypertension I10

 

 [place] [id number] [place] [address] 85986-
6988    Essential hypertension I10 and Type 2 diabetes mellitus 
without complication, without long-term current use of insulin E11.9

 

 [place] [address] [address] [id number] [place] [address] 13722-
4842    Chronic kidney disease, stage 3 (moderate) N18.3 ; Anemia 
of chronic disease D63.8 and Type 2 diabetes mellitus without complication, 
without long-term current use of insulin E11.9

 

 [place] [address] [address] [id number] [place] [address] 48517-
7714    Type 2 diabetes mellitus without complication, without long-
term current use of insulin E11.9 ; Iron deficiency anemia secondary to 
inadequate dietary iron intake D50.8 ; Arthritis M19.90 and Lumbago with 
sciatica, left side M54.42

 

 [place] [id number] [place] [address] 17720-
0349    Arthritis M19.90 and Anxiety F41.9

 

 [place] [id number] [place] [address] 19494-
8925    Type 2 diabetes mellitus without complication, without long-
term current use of insulin E11.9 and Essential hypertension I10

 

 [facility]  [address] [place] [address] 33702-
9014  22 May, 2017  Anxiety F41.9

 

 [place] [address] [address] [id number] [place] [address] 00795-
7381  18 May, 2017  Monilial rash B37.2

 

 [place] [address] [place] [id number] [place] [address] 58185-
8182  16 May, 2017   

 

 [facility]  [address] [id number] [place] [address] 75814-
8974  15 May, 2017   

 

 [place] [address] 57661-
2439  11 May, 2017   

 

 [place] [address] [place] [id number] [place] [address] 44668-
9050  11 May, 2017  Gastroesophageal reflux disease, esophagitis presence not 
specified K21.9

 

 [place] [address] [place] [id number] [place] [address] 43437-
3974  09 May, 2017  Arthritis M19.90

 

 [place] [address] [place] [id number] [place] [address] 72385-
8385  09 May, 2017  Anxiety F41.9 ; Arthritis M19.90 and Essential hypertension 
I10

 

 [place] [address] [place] [address] [id number] [place] [address] 88426-
0702  05 May, 2017  Essential hypertension I10 and Anxiety F41.9

 

 [place] [address] [place] [id number] [place] [address] 86605-
8865     

 

 [place] [address] [place] [address] 06958-
1634    Arthritis M19.90 and Anxiety F41.9

 

 [place] [address] [place] [address] 47111-
3761    Type 2 diabetes mellitus without complication, without long-
term current use of insulin E11.9 ; Cellulitis of right lower extremity L03.115 
and Arthritis M19.90

 

 [place] [address] [address] [id number] [place] [address] 45084-
0423  28 Mar, 2017   

 

 [place] [address] [address]663-
1755  20 Mar, 2017  Type 2 diabetes mellitus without complication, without long-
term current use of insulin E11.9 ; Bronchitis J40 and Arthritis M19.90

 

 [place] [address] 16273-
3671  16 Mar, 2017   

 

 [place] [address] 73898-
2710    Anxiety F41.9

 

 [facility] [address] [address]    Dysuria R30.0 ; Acute cystitis with hematuria N30.01 and 
Vaginal yeast infection B37.3

 

 [address] [address]-
9555    Arthritis M19.90

 

 [facility] [place] [address] [address]
-[phone]    Strep pharyngitis J02.0 and Sore throat J02.9

 

 [place] [address] [id number] [place] [address] 69314-
7067     

 

 [place] [address] [place] [id number] [place] [address] 26418-
8052    Type 2 diabetes mellitus without complication, without long-
term current use of insulin E11.9

 

 [place] [address] [address] [id number] [place] [address] 17869-
1476  14 Dec, 2016   

 

 [address] [address]-
5537  13 Dec, 2016  Seasonal allergic rhinitis due to pollen J30.1

 

 [facility] [place] [id number] [place] [address] 91726
-[phone]  09 Dec, 2016  Impacted cerumen of right ear H61.21 and Seasonal allergic 
rhinitis due to pollen J30.1

 

 [facility]  [id number] [place] [address] 80900-
9559  09 Dec, 2016   

 

 [facility]  [id number] [place] [address] 62987-
3204  05 Dec, 2016   

 

 [facility]  [address] [id number] [place] [address] 87761-
0608    Type 2 diabetes mellitus without complication, without long-
term current use of insulin E11.9 ; Anxiety F41.9 ; Essential hypertension I10 
and Encounter for immunization Z23

 

 [facility]  [id number] [place] [address] 08062-
3188    Essential hypertension I10

 

 [facility]  [id number] [place] [address] 84482-
1743     

 

 [facility]  [id number] [place] [address] 85031-
2368     

 

 [facility]  [address] [id number] [place] [address] 24077-
4200    Essential hypertension I10

 

 [facility]  [address] [id number] [place] [address] 33485-
4697     

 

 [facility]  [address] [id number] [place] [address] 12375-
1682  23 Sep, 2016   

 

 [facility]  [id number] [place] [address] 34134-
0413  01 Sep, 2016   

 

 [facility]  [id number] [place] [address] 22750-
5661  30 Aug, 2016   

 

 [facility]  [address] [id number] [place] [address] 20627-
9422  24 Aug, 2016   

 

 [facility]  [id number] [place] [address] 84617-
6748  22 Aug, 2016   

 

 [facility]  [address] [id number] [place] [address] 62445-
3676  22 Aug, 2016  Type 2 diabetes mellitus without complication, without long-
term current use of insulin E11.9 ; Essential hypertension I10 ; Acute non-
recurrent maxillary sinusitis J01.00 ; Arthritis M19.90 ; Lumbago with sciatica
, left side M54.42 ; Other chronic pain G89.29 and Anxiety F41.9







IMMUNIZATIONS

No Known Immunizations



SOCIAL HISTORY

Never Assessed



REASON FOR VISIT

Med changes



PLAN OF CARE





VITAL SIGNS





MEDICATIONS







 Medication  Instructions  Dosage  Frequency  Start Date  End Date  Duration  
Status

 

 Omeprazole 20 MG  Orally Once a day  1 capsule  24h       30 day(s
)  Active







RESULTS

No Results



PROCEDURES

No Known procedures



INSTRUCTIONS





MEDICATIONS ADMINISTERED

No Known Medications



MEDICAL (GENERAL) HISTORY







 Type  Description  Date

 

 Medical History  type II diabetes   

 

 Medical History  hypertension   

 

 Medical History  Arthritis   

 

 Medical History  skin cancer-scalp   

 

 Surgical History  hysterectomy   

 

 Surgical History  skin cancer removal-scalp   

 

 Hospitalization History  Surgery(s) only   

 

 Hospitalization History  dehydration  2017

 

 Hospitalization History  bronchitis  2107

## 2019-02-08 NOTE — XMS REPORT
[facility]

 Created on: 2017



Sandrita Stoddard

External Reference #: [id number]

: 1934

Sex: Female



Demographics







 Address  [address] [place]  [address]

 

 Preferred Language  Unknown

 

 Marital Status  Unknown

 

 Jainism Affiliation  Unknown

 

 Race  Unknown

 

 Ethnic Group  Unknown





Author







 Author  YULISA RANGEL

 

 [facility]

 

 Address  [address] [place]  [address]



 

 Phone  [phone]







Care Team Providers







 Care Team Member Name  Role  Phone

 

 YULISA RANGEL  Unavailable  [phone]







PROBLEMS







 Type  Condition  ICD9-CM Code  FCL27-SJ Code  Onset Dates  Condition Status  
SNOMED Code

 

 Problem  Arthritis     M19.90     Active  8922354

 

 Problem  Anxiety     F41.9     Active  05326896

 

 Problem  Essential hypertension     I10     Active  07223308

 

 Problem  Lumbago with sciatica, left side     M54.42     Active  476501231

 

 Problem  Venous insufficiency     I87.2     Active  78880542

 

 Problem  Anemia of chronic disease     D63.8     Active  692856967

 

 Problem  Seasonal allergic rhinitis due to pollen     J30.1     Active  
65159095

 

 Problem  Type 2 diabetes mellitus without complication, without long-term 
current use of insulin     E11.9     Active  974640811

 

 Problem  Chronic kidney disease, stage 3 (moderate)     N18.3     Active  
965066796

 

 Problem  Gastroesophageal reflux disease, esophagitis presence not specified  
   K21.9     Active  316641734







ALLERGIES

No Information



SOCIAL HISTORY

Never Assessed



PLAN OF CARE





VITAL SIGNS





MEDICATIONS







 Medication  Instructions  Dosage  Frequency  Start Date  End Date  Duration  
Status

 

 Wheelchair -     use for ambulation  24h  09 May, 2017        Active







RESULTS

No Results



PROCEDURES

No Known procedures



IMMUNIZATIONS

No Known Immunizations



MEDICAL (GENERAL) HISTORY







 Type  Description  Date

 

 Medical History  type II diabetes   

 

 Medical History  hypertension   

 

 Medical History  Arthritis   

 

 Medical History  skin cancer-scalp   

 

 Surgical History  hysterectomy   

 

 Surgical History  skin cancer removal-scalp   

 

 Hospitalization History  Surgery(s) only   

 

 Hospitalization History  dehydration  2017

 

 Hospitalization History  bronchitis  2107

## 2019-02-08 NOTE — XMS REPORT
[facility]

 Created on: 08/15/2017



Sandrita Stoddard

External Reference #: [id number]

: 1934

Sex: Female



Demographics







 Address  [address] [place]  [address]

 

 Preferred Language  Unknown

 

 Marital Status  Unknown

 

 Jewish Affiliation  Unknown

 

 Race  Unknown

 

 Ethnic Group  Unknown





Author







 Author  YULISA RANGEL

 

 [facility]

 

 Address  [address] [place]  [address]



 

 Phone  [phone]







Care Team Providers







 Care Team Member Name  Role  Phone

 

 YULISA RANGEL  Unavailable  [phone]







PROBLEMS







 Type  Condition  ICD9-CM Code  EAF98-ND Code  Onset Dates  Condition Status  
SNOMED Code

 

 Problem  Anxiety     F41.9     Active  20697986

 

 Problem  Arthritis     M19.90     Active  0186653

 

 Problem  Lumbago with sciatica, left side     M54.42     Active  071805001

 

 Problem  Chronic kidney disease, stage 3 (moderate)     N18.3     Active  
624859555

 

 Problem  Anemia of chronic disease     D63.8     Active  919877183

 

 Problem  Type 2 diabetes mellitus without complication, without long-term 
current use of insulin     E11.9     Active  549925584

 

 Problem  Essential hypertension     I10     Active  64656792

 

 Problem  Gastroesophageal reflux disease, esophagitis presence not specified  
   K21.9     Active  133644205

 

 Problem  Seasonal allergic rhinitis due to pollen     J30.1     Active  
88856578







ALLERGIES

Unknown Allergies



SOCIAL HISTORY

No smoking Hx information available



PLAN OF CARE





VITAL SIGNS





MEDICATIONS

Unknown Medications



RESULTS

No Results



PROCEDURES

No Known procedures



IMMUNIZATIONS

No Known Immunizations

## 2019-02-08 NOTE — XMS REPORT
[facility]

 Created on: 2018



Sandrita Stoddard

External Reference #: [id number]

: 1934

Sex: Female



Demographics







 Address  [address] [place]  [address]

 

 Preferred Language  Unknown

 

 Marital Status  Unknown

 

 Adventism Affiliation  Unknown

 

 Race  Unknown

 

 Ethnic Group  Unknown





Author







 Author  YULISA RANGEL

 

 [facility]

 

 Address  [address] [place]  [address]



 

 Phone  [phone]







Care Team Providers







 Care Team Member Name  Role  Phone

 

 YULISA RANGEL  Unavailable  [phone]







PROBLEMS







 Type  Condition  ICD9-CM Code  QYH12-IH Code  Onset Dates  Condition Status  
SNOMED Code

 

 Problem  Essential hypertension     I10     Active  61843104

 

 Problem  Type 2 diabetes mellitus without complication, without long-term 
current use of insulin     E11.9     Active  878590836

 

 Problem  Anxiety     F41.9     Active  63815074

 

 Problem  Lumbago with sciatica, left side     M54.42     Active  700513883

 

 Problem  Arthritis     M19.90     Active  4934159

 

 Problem  Iron deficiency anemia, unspecified iron deficiency anemia type     
D50.9     Active  45491217

 

 Problem  Venous insufficiency     I87.2     Active  14804077

 

 Problem  Gastroesophageal reflux disease, esophagitis presence not specified  
   K21.9     Active  314972857

 

 Problem  Seasonal allergic rhinitis due to pollen     J30.1     Active  
34480143

 

 Problem  Anemia of chronic disease     D63.8     Active  629226192

 

 Problem  Chronic kidney disease, stage 3 (moderate)     N18.3     Active  
048194014







ALLERGIES

No Information



ENCOUNTERS







 Encounter  Location  Date  Diagnosis

 

 [place] [id number] [place] [address] 09643-
4214  06 Aug, 2018   

 

 [place] [id number] [place] [address] 16588-
[phone]    Essential hypertension I10

 

 [place] [id number] [place] [address] 20858-
6205    Type 2 diabetes mellitus without complication, without long-
term current use of insulin E11.9 ; Essential hypertension I10 and Anemia of 
chronic disease D63.8

 

 [place] [id number] [place] [address] 52681-
8669    Arthritis M19.90 and Essential hypertension I10

 

 [place] [id number] [place] [address] 17520-
1621  15 May, 2018   

 

 [facility] [place]  [id number] [place] [address] 02552-
7232  15 May, 2018   

 

 [facility] [place]  [id number] [place] [address] 99705-
7314  15 May, 2018  Medicare annual wellness visit, initial Z00.00 ; Type 2 
diabetes mellitus without complication, without long-term current use of 
insulin E11.9 ; Chronic kidney disease, stage 3 (moderate) N18.3 ; Essential 
hypertension I10 ; Gastroesophageal reflux disease, esophagitis presence not 
specified K21.9 ; Anxiety F41.9 ; Arthritis M19.90 and Encounter for 
immunization Z23

 

 [place] [id number] [place] [address] 85701-
7505  03 May, 2018  Essential hypertension I10

 

 [place] [id number] [place] [address] 11073-
3191    Arthritis M19.90

 

 [facility] [place]  [id number] [place] [address] 94755-
6154     

 

 [facility] [place]  [id number] [place] [address] 10512-
9330     

 

 [facility] [place]  [id number] [place] [address] 62747-
3627    Essential hypertension I10

 

 [facility]  [id number] [place] [address] 68345
[phone]  27 Mar, 2018  Dysuria R30.0 and Vaginal candida B37.3

 

 [facility] [place]  [id number] [place] [address] 01643-
9894  08 Mar, 2018  Arthritis M19.90

 

 [facility] [place]  [id number] [place] [address] 47135-
0751     

 

 [facility] [place]  [id number] [place] [address] 28032-
5944    Type 2 diabetes mellitus without complication, without long-
term current use of insulin E11.9 ; Lumbago with sciatica, left side M54.42 ; 
Arthritis M19.90 ; Iron deficiency anemia, unspecified iron deficiency anemia 
type D50.9 and BMI 40.0-44.9, adult Z68.41

 

 [place] [id number] [place] [address] 30801-
4535     

 

 [place] [address] 81917-
2275     

 

 [place] [address] 12842-
0230    Arthritis M19.90 and Anxiety F41.9

 

 [place] [address] 26259-
8652     

 

 [place] [id number] [place] [address] 77688-
5384  18 Dec, 2017  Anxiety F41.9

 

 [place] [id number] [place] [address] 97288-
6435     

 

 [place] [id number] [place] [address] 28495-
3828     

 

 [place] [id number] [place] [address] 33011-
2927    Vaginal yeast infection B37.3

 

 [place] [id number] [place] [address] 16495-
1528    Anxiety F41.9

 

 [place] [id number] [place] [address] 80958-
1920    Type 2 diabetes mellitus without complication, without long-
term current use of insulin E11.9

 

 [place] [id number] [place] [address] 11263-
2905     

 

 [place] [id number] [place] [address] 11048-
1314     

 

 [place] [id number] [place] [address] 17659-
3310  24 Oct, 2017  Arthritis M19.90

 

 [place] [id number] [place] [address] 04661-
6096  16 Oct, 2017   

 

 [place] [id number] [place] [address] 56293-
3070  04 Oct, 2017   

 

 [place] [id number] [place] [address] 78706-
6795  05 Sep, 2017   

 

 [place] [id number] [place] [address] 31374-
5739  25 Aug, 2017  Venous insufficiency I87.2 and Venous stasis dermatitis of 
right lower extremity I87.2

 

 [place] [id number] [place] [address] 75534-
8591  21 Aug, 2017  Essential hypertension I10

 

 [place] [id number] [place] [address] 22672-
1119     

 

 [place] [id number] [place] [address] 78877-
7848    Type 2 diabetes mellitus without complication, without long-
term current use of insulin E11.9 and Essential hypertension I10

 

 [place] [address] [place] [address] 97025-
2880    Essential hypertension I10 and Type 2 diabetes mellitus 
without complication, without long-term current use of insulin E11.9

 

 [place] [id number] [place] [address] 61511-
1498    Chronic kidney disease, stage 3 (moderate) N18.3 ; Anemia 
of chronic disease D63.8 and Type 2 diabetes mellitus without complication, 
without long-term current use of insulin E11.9

 

 [place] [address] [place] [address] 87167-
7648    Type 2 diabetes mellitus without complication, without long-
term current use of insulin E11.9 ; Iron deficiency anemia secondary to 
inadequate dietary iron intake D50.8 ; Arthritis M19.90 and Lumbago with 
sciatica, left side M54.42

 

 [facility] [place]  [id number] [place] [address] 68877-
7208    Arthritis M19.90 and Anxiety F41.9

 

 [facility] [place]  [id number] [place] [address] 52080-
0186    Type 2 diabetes mellitus without complication, without long-
term current use of insulin E11.9 and Essential hypertension I10

 

 [facility] [place]  [address] 18142-
4079  22 May, 2017  Anxiety F41.9

 

 [facility] [place]  [address] 63611-
9262  18 May, 2017  Monsoledad rash B37.2

 

 [facility] [place]  [address] 93452-
9485  16 May, 2017   

 

 [facility] [place]  [address] 09837-
4755  15 May, 2017   

 

 [facility] [place]  [address] 21952-
6622  11 May, 2017   

 

 [facility] [place]  [address] 25263-
8070  11 May, 2017  Gastroesophageal reflux disease, esophagitis presence not 
specified K21.9

 

 [facility] [place]  [id number] [place] [address] 16360-
4335  09 May, 2017  Arthritis M19.90

 

 [facility] [place]  [address] 23780-
0298  09 May, 2017  Anxiety F41.9 ; Arthritis M19.90 and Essential hypertension 
I10

 

 [facility] [place]  [address] 64476-
4815  05 May, 2017  Essential hypertension I10 and Anxiety F41.9

 

 [facility] [place]  [id number] [place] [address] 40886-
4249     

 

 [facility] [place]  [address] [place] [address]-
3902    Arthritis M19.90 and Anxiety F41.9

 

 [place] [address] 88404-
9207    Type 2 diabetes mellitus without complication, without long-
term current use of insulin E11.9 ; Cellulitis of right lower extremity L03.115 
and Arthritis M19.90

 

 [place] [address] 84914-
2212  28 Mar, 2017   

 

 [place] [address] 10049-
1967  20 Mar, 2017  Type 2 diabetes mellitus without complication, without long-
term current use of insulin E11.9 ; Bronchitis J40 and Arthritis M19.90

 

 [place] [address] 91164-
0336  16 Mar, 2017   

 

 [place] [address] 80666-
5207    Anxiety F41.9

 

 [facility]  [address] [address]15
[phone]    Dysuria R30.0 ; Acute cystitis with hematuria N30.01 and 
Vaginal yeast infection B37.3

 

 [place] [id number] [place] [address] 46734-
6124    Arthritis M19.90

 

 [facility]  [id number] [place] [address] 01509
-[phone]    Strep pharyngitis J02.0 and Sore throat J02.9

 

 [place] [address] 85686-
0633     

 

 [place] [address] 39854-
2414    Type 2 diabetes mellitus without complication, without long-
term current use of insulin E11.9

 

 [place] [address] 31903-
7487  14 Dec, 2016   

 

 [facility], [place]  [address] 66834-
7137  13 Dec, 2016  Seasonal allergic rhinitis due to pollen J30.1

 

 [facility] [place] [facility]  [address] 58661
-[phone]  09 Dec, 2016  Impacted cerumen of right ear H61.21 and Seasonal allergic 
rhinitis due to pollen J30.1

 

 [facility], [place]  [address] 73027-
6450  09 Dec, 2016   

 

 [facility] [place]  [address] 34559-
2283  05 Dec, 2016   

 

 [facility] [place]  [address] 02651-
0450    Type 2 diabetes mellitus without complication, without long-
term current use of insulin E11.9 ; Anxiety F41.9 ; Essential hypertension I10 
and Encounter for immunization Z23

 

 [facility], [place]  [address] 57792-
0021    Essential hypertension I10

 

 [place] [address] 89600-
2254     

 

 [place] [address] 34297-
8534     

 

 [facility] [place]  [address] 64605-
6193    Essential hypertension I10

 

 [facility] [place]  [id number] [place] [address] 52251-
3066     

 

 [facility] [place]  [address] 87437-
8703  23 Sep, 2016   

 

 [facility] [place]  [address] 24903-
2053  01 Sep, 2016   

 

 [facility] [place]  [address] 18560-
3917  30 Aug, 2016   

 

 [facility] [place]  [address] [facility] [id number] [address]-
9590  24 Aug, 2016   

 

 [facility] [place]  [address] [facility] [id number] [address]-
5671  22 Aug, 2016   

 

 [facility] [place]  [address] [facility] [id number] [address]-
2528  22 Aug, 2016  Type 2 diabetes mellitus without complication, without long-
term current use of insulin E11.9 ; Essential hypertension I10 ; Acute non-
recurrent maxillary sinusitis J01.00 ; Arthritis M19.90 ; Lumbago with sciatica
, left side M54.42 ; Other chronic pain G89.29 and Anxiety F41.9







IMMUNIZATIONS

No Known Immunizations



SOCIAL HISTORY

Never Assessed



REASON FOR VISIT





PLAN OF CARE





VITAL SIGNS





MEDICATIONS







 Medication  Instructions  Dosage  Frequency  Start Date  End Date  Duration  
Status

 

 Diflucan 150 MG     1 tablet     15 May, 2018     1 dose  Active







RESULTS

No Results



PROCEDURES

No Known procedures



INSTRUCTIONS





MEDICATIONS ADMINISTERED

No Known Medications



MEDICAL (GENERAL) HISTORY







 Type  Description  Date

 

 Medical History  type II diabetes   

 

 Medical History  hypertension   

 

 Medical History  Arthritis   

 

 Medical History  skin cancer-scalp   

 

 Surgical History  hysterectomy   

 

 Surgical History  skin cancer removal-scalp   

 

 Hospitalization History  Surgery(s) only   

 

 Hospitalization History  dehydration  2017

 

 Hospitalization History  bronchitis  2107

## 2019-02-08 NOTE — XMS REPORT
[facility]

 Created on: 2018



Richi Sandrita

External Reference #: [id number]

: 1934

Sex: Female



Demographics







 Address  [address] [place]  [address]

 

 Preferred Language  Unknown

 

 Marital Status  Unknown

 

 Rastafarian Affiliation  Unknown

 

 Race  Unknown

 

 Ethnic Group  Unknown





Author







 Author  YULISA RANGEL

 

 [facility]

 

 Address  [address] [place]  [address]



 

 Phone  [phone]







Care Team Providers







 Care Team Member Name  Role  Phone

 

 YULISA RANGEL  Unavailable  [phone]







PROBLEMS







 Type  Condition  ICD9-CM Code  SWG78-LW Code  Onset Dates  Condition Status  
SNOMED Code

 

 Problem  Essential hypertension     I10     Active  20811628

 

 Problem  Type 2 diabetes mellitus without complication, without long-term 
current use of insulin     E11.9     Active  413808062

 

 Problem  Anxiety     F41.9     Active  53801011

 

 Problem  Lumbago with sciatica, left side     M54.42     Active  423707722

 

 Problem  Arthritis     M19.90     Active  6367661

 

 Problem  Iron deficiency anemia, unspecified iron deficiency anemia type     
D50.9     Active  21107710

 

 Problem  Venous insufficiency     I87.2     Active  61747000

 

 Problem  Gastroesophageal reflux disease, esophagitis presence not specified  
   K21.9     Active  048824703

 

 Problem  Seasonal allergic rhinitis due to pollen     J30.1     Active  
49739335

 

 Problem  Anemia of chronic disease     D63.8     Active  199141410

 

 Problem  Chronic kidney disease, stage 3 (moderate)     N18.3     Active  
354977335







ALLERGIES

No Information



ENCOUNTERS







 Encounter  Location  Date  Diagnosis

 

 [place] [id number], [place] [address]-
9997    Essential hypertension I10

 

 [facility]  [id number], [place] [address]-
1045  31 Oct, 2018  Essential hypertension I10

 

 [facility]  [id number], [place] [address]
7155  22 Oct, 2018   

 

 [facility]  [id number] [place] [address]-
8453  22 Oct, 2018   

 

 [facility]  [id number], [place] [address]-
9957  04 Oct, 2018  Essential hypertension I10

 

 [place] [id number], [place] [address]-
8881  06 Sep, 2018  Essential hypertension I10 and Arthritis M19.90

 

 [place] [address] [place] [id number] [place] [address]67-
5063  28 Aug, 2018   

 

 [place] [address] [place] [id number] [place] [address] 10030-
3016  16 Aug, 2018   

 

 [place] [address] [place] [id number] [place] [address] 68239-
3196  06 Aug, 2018   

 

 [place] [address] [place] [id number] [place] [address] 30766-
3182    Essential hypertension I10

 

 [place] [address] [place] [address] [place] [address]-
5212    Type 2 diabetes mellitus without complication, without long-
term current use of insulin E11.9 ; Essential hypertension I10 and Anemia of 
chronic disease D63.8

 

 [place] [address] [place] [id number] [place] [address] 17969-
6893    Arthritis M19.90 and Essential hypertension I10

 

 [place] [id number] [place] [address] 80729-
2166  15 May, 2018   

 

 [place] [address] [place] [id number] [place] [address] 44373-
6650  15 May, 2018   

 

 [place] [address] [place] [address] [place] [address] 6[address]00-
3706  15 May, 2018  Medicare annual wellness visit, initial Z00.00 ; Type 2 
diabetes mellitus without complication, without long-term current use of 
insulin E11.9 ; Chronic kidney disease, stage 3 (moderate) N18.3 ; Essential 
hypertension I10 ; Gastroesophageal reflux disease, esophagitis presence not 
specified K21.9 ; Anxiety F41.9 ; Arthritis M19.90 and Encounter for 
immunization Z23

 

 [place] [address] [place] [address] 8[address]65-
1768  03 May, 2018  Essential hypertension I10

 

 [place] [address] [place] [address]-
9677    Arthritis M19.90

 

 [place] [address] [address]-
2369     

 

 [facility]  [address] [address]-
8421     

 

 [place] [address] [address]-
6644    Essential hypertension I10

 

 [facility]  [address] [address]
[phone]  27 Mar, 2018  Dysuria R30.0 and Vaginal candida B37.3

 

 [place] [address] [address]-
3429  08 Mar, 2018  Arthritis M19.90

 

 [place] [address] [address]-
9535     

 

 [place] [address] [address]-
9287    Type 2 diabetes mellitus without complication, without long-
term current use of insulin E11.9 ; Lumbago with sciatica, left side M54.42 ; 
Arthritis M19.90 ; Iron deficiency anemia, unspecified iron deficiency anemia 
type D50.9 and BMI 40.0-44.9, adult Z68.41

 

 [place] [id number] [place] [address]-
7597     

 

 [place] [address] [place] [address] [place] [address] 45230-
9838     

 

 [place] [address] [address]-
7028    Arthritis M19.90 and Anxiety F41.9

 

 [place] [address] [place] [address] [place] [address] 19957-
7101     

 

 [place] [address] [address]-
3557  18 Dec, 2017  Anxiety F41.9

 

 [facility]  [id number] [place] [address] 31051-
6288     

 

 [facility]  [address] [place] [address] 95934-
5251     

 

 [facility]  [address] [place] [address] 91564-
2775    Vaginal yeast infection B37.3

 

 [facility]  [address] [address]-
0332    Anxiety F41.9

 

 [facility]  [address] [address]-
3524    Type 2 diabetes mellitus without complication, without long-
term current use of insulin E11.9

 

 [facility]  [address] [place] [address] 92835-
5714     

 

 [facility]  [address] [place] [address] 33138-
0274     

 

 [facility]  [address] [place] [address] 27635-
6583  24 Oct, 2017  Arthritis M19.90

 

 [facility]  [address] [place] [address] 98560-
8529  16 Oct, 2017   

 

 [facility]  [address] [place] [address] 45659-
7039  04 Oct, 2017   

 

 [facility]  [address] [place] [address] 39323-
4431  05 Sep, 2017   

 

 [facility]  [address] [place] [address] 71230-
6769  25 Aug, 2017  Venous insufficiency I87.2 and Venous stasis dermatitis of 
right lower extremity I87.2

 

 [facility]  [address] [place] [address] 41449-
0831  21 Aug, 2017  Essential hypertension I10

 

 [facility]  [address] [place] [address] [place] [address] 70187-
7312     

 

 [place] [address] [place] [place] [address] 33286-
5087    Type 2 diabetes mellitus without complication, without long-
term current use of insulin E11.9 and Essential hypertension I10

 

 [place] [address] [place] [address] [place] [address] 07330-
3171    Essential hypertension I10 and Type 2 diabetes mellitus 
without complication, without long-term current use of insulin E11.9

 

 [place] [address] [place] [address] [place] [address] 47[address]-
1288    Chronic kidney disease, stage 3 (moderate) N18.3 ; Anemia 
of chronic disease D63.8 and Type 2 diabetes mellitus without complication, 
without long-term current use of insulin E11.9

 

 [place] [address] [place] [address] [place] [address]-
5316    Type 2 diabetes mellitus without complication, without long-
term current use of insulin E11.9 ; Iron deficiency anemia secondary to 
inadequate dietary iron intake D50.8 ; Arthritis M19.90 and Lumbago with 
sciatica, left side M54.42

 

 [place] [address] [place] [place] [address] 81273-
7460    Arthritis M19.90 and Anxiety F41.9

 

 [place] [address] [place] [address] [place] [address] 78017-
1877    Type 2 diabetes mellitus without complication, without long-
term current use of insulin E11.9 and Essential hypertension I10

 

 [place] [address] [place] [address] 33208-
3627  22 May, 2017  Anxiety F41.9

 

 [place] [address] [place] [address] [address] [place] [address] 03617-
9486  18 May, 2017  Monilial rash B37.2

 

 [place] [address] [place] [address] [place] [address] 34903-
3258  16 May, 2017   

 

 [place] [address], [place] [address]-
0382  15 May, 2017   

 

 [facility]  [address] [place] [address] 48931-
4642  11 May, 2017   

 

 [facility]  [address] [place] [address] 23151-
6916  11 May, 2017  Gastroesophageal reflux disease, esophagitis presence not 
specified K21.9

 

 [facility]  [address] [place] [address] 46963-
4933  09 May, 2017  Arthritis M19.90

 

 [facility]  [address] [place] [address] 76424-
1514  09 May, 2017  Anxiety F41.9 ; Arthritis M19.90 and Essential hypertension 
I10

 

 [place] [address] [place] [address] [place] [address] 36772-
9057  05 May, 2017  Essential hypertension I10 and Anxiety F41.9

 

 [facility]  [address] [place] [address] 12785-
5911     

 

 [facility]  [address] [place] [address] 16259-
3503    Arthritis M19.90 and Anxiety F41.9

 

 [facility]  [address] [place] [address] 49177-
6722    Type 2 diabetes mellitus without complication, without long-
term current use of insulin E11.9 ; Cellulitis of right lower extremity L03.115 
and Arthritis M19.90

 

 [facility]  [address], [place] [address]-
1309  28 Mar, 2017   

 

 [facility]  [address] [place] [address] 87478-
8065  20 Mar, 2017  Type 2 diabetes mellitus without complication, without long-
term current use of insulin E11.9 ; Bronchitis J40 and Arthritis M19.90

 

 [facility]  [address] [place] [address] 71727-
1130  16 Mar, 2017   

 

 [place] [address] [place] [address] [place] [address] 26376-
2540    Anxiety F41.9

 

 [facility] [place] [address] [phone]    Dysuria R30.0 ; Acute cystitis with hematuria N30.01 and 
Vaginal yeast infection B37.3

 

 [address] [address]-
0476    Arthritis M19.90

 

 [facility] [place] [address] [address]    Strep pharyngitis J02.0 and Sore throat J02.9

 

 [address] [address]-
9133     

 

 [address] [address]-
6456    Type 2 diabetes mellitus without complication, without long-
term current use of insulin E11.9

 

 [place] [address] [address]-
0195  14 Dec, 2016   

 

 [place] [address] [address]-
7909  13 Dec, 2016  Seasonal allergic rhinitis due to pollen J30.1

 

 [facility] [place] [id number] [place] [address]
-7395  09 Dec, 2016  Impacted cerumen of right ear H61.21 and Seasonal allergic 
rhinitis due to pollen J30.1

 

 [place] [address] [place] [address] [place] [address] 22035-
8017  09 Dec, 2016   

 

 [address] [address]-
4179  05 Dec, 2016   

 

 [place] [address] [address]-
2613    Type 2 diabetes mellitus without complication, without long-
term current use of insulin E11.9 ; Anxiety F41.9 ; Essential hypertension I10 
and Encounter for immunization Z23

 

 [facility]  [address] [place] [address] 18989-
3257    Essential hypertension I10

 

 [facility]  [address] [place] [address] 69487-
2413     

 

 [facility]  [address] [place] [address] 73080-
0671     

 

 [facility]  [address] [place] [address] 85749-
1877    Essential hypertension I10

 

 [facility]  [address] [address]-
4316     

 

 [facility]  [address] [place] [address] 81534-
9022  23 Sep, 2016   

 

 [facility]  [address] [place] [address] 43568-
6110  01 Sep, 2016   

 

 [facility]  [address] [place] [address] 19001-
6618  30 Aug, 2016   

 

 [facility]  [address] [place] [address] 87533-
4316  24 Aug, 2016   

 

 [facility]  [address] [place] [address] 57388-
3545  22 Aug, 2016   

 

 [facility]  [address] [place] [address] 31327-
1594  22 Aug, 2016  Type 2 diabetes mellitus without complication, without long-
term current use of insulin E11.9 ; Essential hypertension I10 ; Acute non-
recurrent maxillary sinusitis J01.00 ; Arthritis M19.90 ; Lumbago with sciatica
, left side M54.42 ; Other chronic pain G89.29 and Anxiety F41.9







IMMUNIZATIONS

No Known Immunizations



SOCIAL HISTORY

Never Assessed



REASON FOR VISIT

Controlled Med Refill 



PLAN OF CARE





VITAL SIGNS





MEDICATIONS







 Medication  Instructions  Dosage  Frequency  Start Date  End Date  Duration  
Status

 

 Alprazolam 0.5 MG  Orally Three times a day  1 tablet  8h        30 days  
Active







RESULTS

No Results



PROCEDURES

No Known procedures



INSTRUCTIONS





MEDICATIONS ADMINISTERED

No Known Medications



MEDICAL (GENERAL) HISTORY







 Type  Description  Date

 

 Medical History  type II diabetes   

 

 Medical History  hypertension   

 

 Medical History  Arthritis   

 

 Medical History  skin cancer-scalp   

 

 Surgical History  hysterectomy   

 

 Surgical History  skin cancer removal-scalp   

 

 Hospitalization History  Surgery(s) only   

 

 Hospitalization History  dehydration  2017

 

 Hospitalization History  bronchitis  2107

## 2019-02-08 NOTE — XMS REPORT
[facility]

 Created on: 2018



Sandrita Stoddard

External Reference #: [id number]

: 1934

Sex: Female



Demographics







 Address  [address] [place]  [address]

 

 Preferred Language  Unknown

 

 Marital Status  Unknown

 

 Buddhism Affiliation  Unknown

 

 Race  Unknown

 

 Ethnic Group  Unknown





Author







 Author  YULISA RANGEL

 

 Organization  [facility]

 

 Address  [address] [place]  [address]



 

 Phone  [phone]







Care Team Providers







 Care Team Member Name  Role  Phone

 

 YULISA RANGEL  Unavailable  [phone]







PROBLEMS







 Type  Condition  ICD9-CM Code  BBC34-WK Code  Onset Dates  Condition Status  
SNOMED Code

 

 Problem  Essential hypertension     I10     Active  63196683

 

 Problem  Type 2 diabetes mellitus without complication, without long-term 
current use of insulin     E11.9     Active  381480940

 

 Problem  Anxiety     F41.9     Active  28414808

 

 Problem  Lumbago with sciatica, left side     M54.42     Active  784210112

 

 Problem  Arthritis     M19.90     Active  9220046

 

 Problem  Iron deficiency anemia, unspecified iron deficiency anemia type     
D50.9     Active  81491605

 

 Problem  Venous insufficiency     I87.2     Active  85147815

 

 Problem  Gastroesophageal reflux disease, esophagitis presence not specified  
   K21.9     Active  438460014

 

 Problem  Seasonal allergic rhinitis due to pollen     J30.1     Active  
21004987

 

 Problem  Anemia of chronic disease     D63.8     Active  921038711

 

 Problem  Chronic kidney disease, stage 3 (moderate)     N18.3     Active  
156020976







ALLERGIES

No Information



ENCOUNTERS







 Encounter  Location  Date  Diagnosis

 

 [place] [address] [place] [address] [phone]
[phone]  15 May, 2018   

 

 [facility]  [address] [place] [address] [id number] [place] [address] [phone]-
[phone]  15 May, 2018   

 

 [facility]  [address] [place] [address] 19806-
[phone]  15 May, 2018  Medicare annual wellness visit, initial Z00.00 ; Type 2 
diabetes mellitus without complication, without long-term current use of 
insulin E11.9 ; Chronic kidney disease, stage 3 (moderate) N18.3 ; Essential 
hypertension I10 ; Gastroesophageal reflux disease, esophagitis presence not 
specified K21.9 ; Anxiety F41.9 ; Arthritis M19.90 and Encounter for 
immunization Z23

 

 [place] [address] [place] [address] 63717-
[phone]  03 May, 2018  Essential hypertension I10

 

 [facility]  [address] [phone]-
[phone]    Arthritis M19.90

 

 [facility]  [address] [address] [place] [address] 21951-
[phone]     

 

 [facility]  [address] [phone]-
[phone]     

 

 [facility]  [address] [place] [address] [phone]-
[phone]    Essential hypertension I10

 

 [facility]  [address] [place] [address] [phone]
[phone]  27 Mar, 2018  Dysuria R30.0 and Vaginal candida B37.3

 

 [place] [address] [phone]-
[phone]  08 Mar, 2018  Arthritis M19.90

 

 [facility]  [address] [place] [address] 68754-
[phone]     

 

 [place] [address] [place] [address] [place] [address] 49929-
7788    Type 2 diabetes mellitus without complication, without long-
term current use of insulin E11.9 ; Lumbago with sciatica, left side M54.42 ; 
Arthritis M19.90 ; Iron deficiency anemia, unspecified iron deficiency anemia 
type D50.9 and BMI 40.0-44.9, adult Z68.41

 

 [facility]  [address] [place] [address] 58247-
[phone]     

 

 [place] [address] [place] [address] [place] [address] 33099-
[phone]     

 

 [place] [address] [place] [address] [place] [address] 21751-
8164    Arthritis M19.90 and Anxiety F41.9

 

 [place] [address] [place] [address] [place] [address] 74873-
[phone]     

 

 [facility]  [address] [place] [address] 96853-
9751  18 Dec, 2017  Anxiety F41.9

 

 [facility]  [address] [place] [address] 47641-
[phone]     

 

 [facility]  [address] [phone]-
[phone]     

 

 [facility]  [address] [place] [address] [place] [address] 51941-
3179    Vaginal yeast infection B37.3

 

 [facility]  [address] [place] [place] [address] 98671-
3870    Anxiety F41.9

 

 [facility]  [address] [place] [address] 62489-
3975    Type 2 diabetes mellitus without complication, without long-
term current use of insulin E11.9

 

 [facility]  [address] [place] [address] 30881-
5946     

 

 [facility]  [address] [place] [address] 70940-
[phone]     

 

 [facility]  [address] [place] [address] 69612-
8484  24 Oct, 2017  Arthritis M19.90

 

 [facility]  [address] [place] [address] 39284-
8652  16 Oct, 2017   

 

 [facility]  [address] [place] [address] [phone]-
[phone]  04 Oct, 2017   

 

 [facility]  [address] [place] [address] [phone]-
[phone]  05 Sep, 2017   

 

 [facility]  [address] [place] [address] 58959-
4736  25 Aug, 2017  Venous insufficiency I87.2 and Venous stasis dermatitis of 
right lower extremity I87.2

 

 [place] [address] [place] [address] 69679-
[phone]  21 Aug, 2017  Essential hypertension I10

 

 [place] [address] [place] [address] [place] [address] 53084-
5896     

 

 [place] [address] [place] [address] [place] [address] 63021-
7995    Type 2 diabetes mellitus without complication, without long-
term current use of insulin E11.9 and Essential hypertension I10

 

 [place] [address] [place] [address] [place] [address] 20654-
8120    Essential hypertension I10 and Type 2 diabetes mellitus 
without complication, without long-term current use of insulin E11.9

 

 [place] [address] [place] [address] [place] [address] 23100-
9755    Chronic kidney disease, stage 3 (moderate) N18.3 ; Anemia 
of chronic disease D63.8 and Type 2 diabetes mellitus without complication, 
without long-term current use of insulin E11.9

 

 [place] [address] [place] [address] [place] [address] 48688-
9697    Type 2 diabetes mellitus without complication, without long-
term current use of insulin E11.9 ; Iron deficiency anemia secondary to 
inadequate dietary iron intake D50.8 ; Arthritis M19.90 and Lumbago with 
sciatica, left side M54.42

 

 [place] [id number] [place] [address] 61034-
8777    Arthritis M19.90 and Anxiety F41.9

 

 [place] [address] [place] [address] 18449-
9372    Type 2 diabetes mellitus without complication, without long-
term current use of insulin E11.9 and Essential hypertension I10

 

 [place] [address] [place] [address] 62442-
3407  22 May, 2017  Anxiety F41.9

 

 [place] [address] [place] [address] [place] [address] 92786-
[phone]  18 May, 2017  Monilial rash B37.2

 

 [facility]  [address] [place] [address] 12298-
1428  16 May, 2017   

 

 [facility]  [address] [place] [address] 96933-
2462  15 May, 2017   

 

 [facility]  [address] [place] [address] [place] [address] 53124-
6896  11 May, 2017   

 

 [facility]  [address] [place] [address] [address] [place] [address] 75427-
4749  11 May, 2017  Gastroesophageal reflux disease, esophagitis presence not 
specified K21.9

 

 [place] [address] [place] [address] [place] [address] 39404-
9300  09 May, 2017  Arthritis M19.90

 

 [place] [address] [place] [address] [place] [address] 39762-
9939  09 May, 2017  Anxiety F41.9 ; Arthritis M19.90 and Essential hypertension 
I10

 

 [place] [address] [place] [address] [place] [address] 82152-
6927  05 May, 2017  Essential hypertension I10 and Anxiety F41.9

 

 [place] [address] [place] [address] [place] [address] 24476-
3980     

 

 [place] [address] [place] [address] [place] [address] 84129-
9894    Arthritis M19.90 and Anxiety F41.9

 

 [place] [address] [place] [address] [place] [address] 85941-
1346    Type 2 diabetes mellitus without complication, without long-
term current use of insulin E11.9 ; Cellulitis of right lower extremity L03.115 
and Arthritis M19.90

 

 [place] [address] [place] [address] [place] [address] 66380-
1504  28 Mar, 2017   

 

 [place] [address] [place] [address] [place] [address] 48831-
5172  20 Mar, 2017  Type 2 diabetes mellitus without complication, without long-
term current use of insulin E11.9 ; Bronchitis J40 and Arthritis M19.90

 

 [place] [address] [place] [address] [place] [address] 46492-
1134  16 Mar, 2017   

 

 [place] [address] [place] [address] [place] [address] 91218-
4810    Anxiety F41.9

 

 [facility] WALK IN [place] [address] [phone]
[phone]    Dysuria R30.0 ; Acute cystitis with hematuria N30.01 and 
Vaginal yeast infection B37.3

 

 [place] [address] [place] [address] [place] [address] 97851-
1644    Arthritis M19.90

 

 [facility] [place] [address] [place] [address] [place] [address] 77715
-[phone]    Strep pharyngitis J02.0 and Sore throat J02.9

 

 [place] [address] 48222-
4700     

 

 [place] [address] [place] [address] [place] [address] 54175-
3167    Type 2 diabetes mellitus without complication, without long-
term current use of insulin E11.9

 

 [place] [address] [place] [address] [place] [address] 43212-
5617  14 Dec, 2016   

 

 [place] [address] [place] [address] [place] [address] 01933-
9240  13 Dec, 2016  Seasonal allergic rhinitis due to pollen J30.1

 

 [facility] IN [place] [address] [place] [address] [place] [address] [phone]
-[phone]  09 Dec, 2016  Impacted cerumen of right ear H61.21 and Seasonal allergic 
rhinitis due to pollen J30.1

 

 [place] [address] [place] [address] [place] [address] 20260-
9766  09 Dec, 2016   

 

 [place] [address] 71592-
5900  05 Dec, 2016   

 

 [facility]  [address] [address]-
8294    Type 2 diabetes mellitus without complication, without long-
term current use of insulin E11.9 ; Anxiety F41.9 ; Essential hypertension I10 
and Encounter for immunization Z23

 

 [facility]  [address] [place] [address] [place] [address] [phone]-
8118    Essential hypertension I10

 

 [facility]  [address] [place] [address] 01545-
3230     

 

 [facility]  [address] [place] [address] 13580-
6483     

 

 [facility]  [address] [place] [address] [place] [address] 81799-
0212    Essential hypertension I10

 

 [facility]  [address] [place] [address] 68324-
5266     

 

 [facility]  [address] [place] [address] 42659-
[phone]  23 Sep, 2016   

 

 [facility]  [address] [place] [address] [phone]-
[phone]  01 Sep, 2016   

 

 [facility]  [address] [place] [address] [place] [address] 77889-
8336  30 Aug, 2016   

 

 [facility]  [address] [place] [address] 30959-
[phone]  24 Aug, 2016   

 

 [facility]  [address] [place] [address] 44549-
7782  22 Aug, 2016   

 

 [facility]  [address] [place] [address] 41298-
3670  22 Aug, 2016  Type 2 diabetes mellitus without complication, without long-
term current use of insulin E11.9 ; Essential hypertension I10 ; Acute non-
recurrent maxillary sinusitis J01.00 ; Arthritis M19.90 ; Lumbago with sciatica
, left side M54.42 ; Other chronic pain G89.29 and Anxiety F41.9







IMMUNIZATIONS

No Known Immunizations



SOCIAL HISTORY

Never Assessed



REASON FOR VISIT

Refill request



PLAN OF CARE





VITAL SIGNS





MEDICATIONS







 Medication  Instructions  Dosage  Frequency  Start Date  End Date  Duration  
Status

 

 Blood Glucose Test Strip Test Strips  subcutaneously Once a day  1 test strip  
24h          Active







RESULTS

No Results



PROCEDURES

No Known procedures



INSTRUCTIONS





MEDICATIONS ADMINISTERED

No Known Medications



MEDICAL (GENERAL) HISTORY







 Type  Description  Date

 

 Medical History  type II diabetes   

 

 Medical History  hypertension   

 

 Medical History  Arthritis   

 

 Medical History  skin cancer-scalp   

 

 Surgical History  hysterectomy   

 

 Surgical History  skin cancer removal-scalp   

 

 Hospitalization History  Surgery(s) only   

 

 Hospitalization History  dehydration  2017

 

 Hospitalization History  bronchitis  2107

## 2019-02-08 NOTE — XMS REPORT
[facility]

 Created on: 2018



Richi Sandrita

External Reference #: [id number]

: 1934

Sex: Female



Demographics







 Address  [address] [place]  [address]

 

 Preferred Language  Unknown

 

 Marital Status  Unknown

 

 Buddhism Affiliation  Unknown

 

 Race  Unknown

 

 Ethnic Group  Unknown





Author







 Author  YULISA RANGEL

 

 [facility]

 

 Address  [address] [place]  [address]



 

 Phone  [phone]







Care Team Providers







 Care Team Member Name  Role  Phone

 

 YULISA RANGEL  Unavailable  [phone]







PROBLEMS







 Type  Condition  ICD9-CM Code  ERV81-LZ Code  Onset Dates  Condition Status  
SNOMED Code

 

 Problem  Essential hypertension     I10     Active  65998347

 

 Problem  Type 2 diabetes mellitus without complication, without long-term 
current use of insulin     E11.9     Active  236496604

 

 Problem  Anxiety     F41.9     Active  72316916

 

 Problem  Lumbago with sciatica, left side     M54.42     Active  952293751

 

 Problem  Arthritis     M19.90     Active  3145294

 

 Problem  Iron deficiency anemia, unspecified iron deficiency anemia type     
D50.9     Active  64124110

 

 Problem  Venous insufficiency     I87.2     Active  90444739

 

 Problem  Gastroesophageal reflux disease, esophagitis presence not specified  
   K21.9     Active  333741218

 

 Problem  Seasonal allergic rhinitis due to pollen     J30.1     Active  
99708442

 

 Problem  Anemia of chronic disease     D63.8     Active  476732532

 

 Problem  Chronic kidney disease, stage 3 (moderate)     N18.3     Active  
653692562







ALLERGIES

No Information



ENCOUNTERS







 Encounter  Location  Date  Diagnosis

 

 [place] [id number] [place] [id number] [place] [address] 97511-
[phone]     

 

 [place] [id number] [place] [address] 01010-
2404    Arthritis M19.90 and Essential hypertension I10

 

 [facility]  [id number] [place] [address] 89472-
9386  15 May, 2018   

 

 [place] [address] [place] [id number] [place] [address] 18260-
5372  15 May, 2018   

 

 [place] [id number] [place] [address] 91736-
2216  15 May, 2018  Medicare annual wellness visit, initial Z00.00 ; Type 2 
diabetes mellitus without complication, without long-term current use of 
insulin E11.9 ; Chronic kidney disease, stage 3 (moderate) N18.3 ; Essential 
hypertension I10 ; Gastroesophageal reflux disease, esophagitis presence not 
specified K21.9 ; Anxiety F41.9 ; Arthritis M19.90 and Encounter for 
immunization Z23

 

 [facility]  [address] [address]-
7241  03 May, 2018  Essential hypertension I10

 

 [place] [address] 7[address]59-
7545    Arthritis M19.90

 

 [place] [address] [address]-
3227     

 

 [place] [address] [address]54-
6914     

 

 [place] [address] [address]282-
7405    Essential hypertension I10

 

 [facility]  [address] [address]
[phone]  27 Mar, 2018  Dysuria R30.0 and Vaginal candida B37.3

 

 [place] [address] [address]-
1206  08 Mar, 2018  Arthritis M19.90

 

 [place] [address] 15321-
3609     

 

 [place] [address] [address]-
9721    Type 2 diabetes mellitus without complication, without long-
term current use of insulin E11.9 ; Lumbago with sciatica, left side M54.42 ; 
Arthritis M19.90 ; Iron deficiency anemia, unspecified iron deficiency anemia 
type D50.9 and BMI 40.0-44.9, adult Z68.41

 

 [place] [address] [address]-
6462     

 

 [id number] [place] [address] 56514-
8520     

 

 [facility]  [id number] [place] [address] 93228-
2322    Arthritis M19.90 and Anxiety F41.9

 

 [facility]  [id number] [place] [address] 74168-
8453     

 

 [facility]  [address] [address]24-
5352  18 Dec, 2017  Anxiety F41.9

 

 [facility]  [id number] [place] [address] 83249-
1789     

 

 [facility]  [id number] [place] [address] 20758-
2531     

 

 [facility]  [id number] [place] [address] 71937-
6867    Vaginal yeast infection B37.3

 

 [facility]  [id number] [place] [address] 96004-
9343    Anxiety F41.9

 

 [facility]  [id number] [place] [address] 28413-
4229    Type 2 diabetes mellitus without complication, without long-
term current use of insulin E11.9

 

 [facility]  [id number] [place] [address] 17355-
0998     

 

 [facility]  [id number] [place] [address] 03755-
7131     

 

 [facility]  [id number] [place] [address] 29525-
8034  24 Oct, 2017  Arthritis M19.90

 

 [facility]  [id number] [place] [address] 56859-
1546  16 Oct, 2017   

 

 [facility]  [id number] [place] [address] 26372-
8056  04 Oct, 2017   

 

 [facility]  [id number] [place] [address] 56309-
5188  05 Sep, 2017   

 

 [place] [address] 92396-
0419  25 Aug, 2017  Venous insufficiency I87.2 and Venous stasis dermatitis of 
right lower extremity I87.2

 

 [place] [id number] [place] [address] 21386-
7756  21 Aug, 2017  Essential hypertension I10

 

 [place] [address] [address]13-
0969     

 

 [address] [address]-
4407    Type 2 diabetes mellitus without complication, without long-
term current use of insulin E11.9 and Essential hypertension I10

 

 [place] [id number] [place] [address] 31884-
4925    Essential hypertension I10 and Type 2 diabetes mellitus 
without complication, without long-term current use of insulin E11.9

 

 [place] [address] [place] [id number] [place] [address] 42345-
5233    Chronic kidney disease, stage 3 (moderate) N18.3 ; Anemia 
of chronic disease D63.8 and Type 2 diabetes mellitus without complication, 
without long-term current use of insulin E11.9

 

 [place] [id number] [place] [address] 42903-
2583    Type 2 diabetes mellitus without complication, without long-
term current use of insulin E11.9 ; Iron deficiency anemia secondary to 
inadequate dietary iron intake D50.8 ; Arthritis M19.90 and Lumbago with 
sciatica, left side M54.42

 

 [place] [id number] [place] [address] 85205-
1352    Arthritis M19.90 and Anxiety F41.9

 

 [place] [id number] [place] [address] 77461-
7418    Type 2 diabetes mellitus without complication, without long-
term current use of insulin E11.9 and Essential hypertension I10

 

 [facility]  [address] [place] [address] 09725-
3566  22 May, 2017  Anxiety F41.9

 

 [place] [address] [place] [id number] [place] [address] 33815-
3386  18 May, 2017  Monilial rash B37.2

 

 [place] [address] [place] [address] [id number] [place] [address] 03137-
7265  16 May, 2017   

 

 [facility]  [id number] [place] [address] 76880-
5845  15 May, 2017   

 

 [place] [address] [address]14-
0904  11 May, 2017   

 

 [place] [address] [place] [address] [id number] [place] [address] 48332-
3150  11 May, 2017  Gastroesophageal reflux disease, esophagitis presence not 
specified K21.9

 

 [place] [id number] [place] [address] 67192-
8165  09 May, 2017  Arthritis M19.90

 

 [place] [address] [place] [address] [id number] [place] [address] 48871-
1339  09 May, 2017  Anxiety F41.9 ; Arthritis M19.90 and Essential hypertension 
I10

 

 [place] [id number] [place] [address] 91707-
0863  05 May, 2017  Essential hypertension I10 and Anxiety F41.9

 

 [place] [id number] [place] [address] 31485-
9804     

 

 [place] [address] [place] [address] 63921-
1972    Arthritis M19.90 and Anxiety F41.9

 

 [place] [address] [place] [address] 01232-
6572    Type 2 diabetes mellitus without complication, without long-
term current use of insulin E11.9 ; Cellulitis of right lower extremity L03.115 
and Arthritis M19.90

 

 [place] [id number] [place] [address] 64533-
0423  28 Mar, 2017   

 

 [place] [address] [address]10-
6801  20 Mar, 2017  Type 2 diabetes mellitus without complication, without long-
term current use of insulin E11.9 ; Bronchitis J40 and Arthritis M19.90

 

 [place] [address] [address]-
1946  16 Mar, 2017   

 

 [place] [address] 84468-
5114    Anxiety F41.9

 

 [facility] [address] [address]
-[phone]    Dysuria R30.0 ; Acute cystitis with hematuria N30.01 and 
Vaginal yeast infection B37.3

 

 [address] [address]655-
5537    Arthritis M19.90

 

 [facility] [place] [address] [address]    Strep pharyngitis J02.0 and Sore throat J02.9

 

 [place] [id number] [place] [address] 80799-
9244     

 

 [place] [address] [place] [address] [id number] [place] [address] 40553-
1508    Type 2 diabetes mellitus without complication, without long-
term current use of insulin E11.9

 

 [place] [id number] [place] [address] 79955-
9018  14 Dec, 2016   

 

 [address] [address]90-
8951  13 Dec, 2016  Seasonal allergic rhinitis due to pollen J30.1

 

 [facility] [place] [id number] [place] [address] 59816
-[phone]  09 Dec, 2016  Impacted cerumen of right ear H61.21 and Seasonal allergic 
rhinitis due to pollen J30.1

 

 [facility]  [id number] [place] [address] 79588-
4776  09 Dec, 2016   

 

 [facility]  [id number] [place] [address] 34247-
5488  05 Dec, 2016   

 

 [facility]  [id number] [place] [address] 60173-
4136    Type 2 diabetes mellitus without complication, without long-
term current use of insulin E11.9 ; Anxiety F41.9 ; Essential hypertension I10 
and Encounter for immunization Z23

 

 [facility]  [id number] [place] [address] 94006-
4944    Essential hypertension I10

 

 [facility]  [id number] [place] [address] 35443-
2378     

 

 [facility]  [id number] [place] [address] 80120-
2630     

 

 [facility]  [id number] [place] [address] 26774-
9265    Essential hypertension I10

 

 [facility]  [id number] [place] [address] 72449-
4692     

 

 [facility]  [id number] [place] [address] 20332-
5866  23 Sep, 2016   

 

 [facility]  [id number] [place] [address] 91039-
6369  01 Sep, 2016   

 

 [facility]  [id number] [place] [address] 37415-
2519  30 Aug, 2016   

 

 [facility]  [id number] [place] [address] 87454-
0270  24 Aug, 2016   

 

 [facility]  [id number] [place] [address] 72642-
3636  22 Aug, 2016   

 

 [facility]  [id number] [place] [address] 07476-
2660  22 Aug, 2016  Type 2 diabetes mellitus without complication, without long-
term current use of insulin E11.9 ; Essential hypertension I10 ; Acute non-
recurrent maxillary sinusitis J01.00 ; Arthritis M19.90 ; Lumbago with sciatica
, left side M54.42 ; Other chronic pain G89.29 and Anxiety F41.9







IMMUNIZATIONS

No Known Immunizations



SOCIAL HISTORY

Never Assessed



REASON FOR VISIT

Medication question



PLAN OF CARE





VITAL SIGNS





MEDICATIONS

Unknown Medications



RESULTS

No Results



PROCEDURES

No Known procedures



INSTRUCTIONS





MEDICATIONS ADMINISTERED

No Known Medications



MEDICAL (GENERAL) HISTORY







 Type  Description  Date

 

 Medical History  type II diabetes   

 

 Medical History  hypertension   

 

 Medical History  Arthritis   

 

 Medical History  skin cancer-scalp   

 

 Surgical History  hysterectomy   

 

 Surgical History  skin cancer removal-scalp   

 

 Hospitalization History  Surgery(s) only   

 

 Hospitalization History  dehydration  2017

 

 Hospitalization History  bronchitis  2107

## 2019-02-08 NOTE — XMS REPORT
[facility]

 Created on: 2017



Richi Sandrita

External Reference #: [id number]

: 1934

Sex: Female



Demographics







 Address  [address] [place]  [address]

 

 Preferred Language  Unknown

 

 Marital Status  Unknown

 

 Latter-day Affiliation  Unknown

 

 Race  Unknown

 

 Ethnic Group  Unknown





Author







 Author  YULISA RANGEL

 

 [facility]

 

 Address  [address] [place]  [address]



 

 Phone  [phone]







Care Team Providers







 Care Team Member Name  Role  Phone

 

 YULISA RANGEL  Unavailable  [phone]







PROBLEMS







 Type  Condition  ICD9-CM Code  SDZ74-RA Code  Onset Dates  Condition Status  
SNOMED Code

 

 Problem  Arthritis     M19.90     Active  8311185

 

 Problem  Anxiety     F41.9     Active  04349639

 

 Problem  Essential hypertension     I10     Active  95011180

 

 Problem  Lumbago with sciatica, left side     M54.42     Active  788187454

 

 Problem  Venous insufficiency     I87.2     Active  80389536

 

 Problem  Anemia of chronic disease     D63.8     Active  958961847

 

 Problem  Seasonal allergic rhinitis due to pollen     J30.1     Active  
92089217

 

 Problem  Type 2 diabetes mellitus without complication, without long-term 
current use of insulin     E11.9     Active  678794058

 

 Problem  Chronic kidney disease, stage 3 (moderate)     N18.3     Active  
532360667

 

 Problem  Gastroesophageal reflux disease, esophagitis presence not specified  
   K21.9     Active  111498837







ALLERGIES

No Known Allergies



SOCIAL HISTORY

No smoking Hx information available



PLAN OF CARE





VITAL SIGNS





MEDICATIONS







 Medication  Instructions  Dosage  Frequency  Start Date  End Date  Duration  
Status

 

 Glimepiride 4 MG  Orally Once a day  1 tablet with breakfast or the first main 
meal of the day  24h           Active







RESULTS

No Results



PROCEDURES

No Known procedures



IMMUNIZATIONS

No Known Immunizations

## 2019-02-08 NOTE — XMS REPORT
[facility]

 Created on: 2018



Richi Sandrita

External Reference #: [id number]

: 1934

Sex: Female



Demographics







 Address  [address] [place]  [address]

 

 Preferred Language  Unknown

 

 Marital Status  Unknown

 

 [employer] Affiliation  Unknown

 

 Race  Unknown

 

 Ethnic Group  Unknown





Author







 Author  YULISA RANGEL

 

 [facility]

 

 Address  [address] [place]  [address]



 

 Phone  [phone]







Care Team Providers







 Care Team Member Name  Role  Phone

 

 YULISA RANGEL  Unavailable  [phone]







PROBLEMS







 Type  Condition  ICD9-CM Code  ERO76-OS Code  Onset Dates  Condition Status  
SNOMED Code

 

 Problem  Essential hypertension     I10     Active  40274140

 

 Problem  Type 2 diabetes mellitus without complication, without long-term 
current use of insulin     E11.9     Active  295104539

 

 Problem  Anxiety     F41.9     Active  22895860

 

 Problem  Lumbago with sciatica, left side     M54.42     Active  719032411

 

 Problem  Arthritis     M19.90     Active  6825581

 

 Problem  Iron deficiency anemia, unspecified iron deficiency anemia type     
D50.9     Active  48598849

 

 Problem  Venous insufficiency     I87.2     Active  56563589

 

 Problem  Gastroesophageal reflux disease, esophagitis presence not specified  
   K21.9     Active  988269495

 

 Problem  Seasonal allergic rhinitis due to pollen     J30.1     Active  
06209373

 

 Problem  Anemia of chronic disease     D63.8     Active  259012773

 

 Problem  Chronic kidney disease, stage 3 (moderate)     N18.3     Active  
203075161







ALLERGIES

No Information



ENCOUNTERS







 Encounter  Location  Date  Diagnosis

 

 [facility]  [address] [place] [address] 41457-
2623  31 Oct, 2018  Essential hypertension I10

 

 [facility]  [address] [place] [address] 85846-
6282  22 Oct, 2018   

 

 [facility]  [id number] [place] [address] 57243-
6419  22 Oct, 2018   

 

 [place] [id number] [place] [address] 00656-
0117  04 Oct, 2018  Essential hypertension I10

 

 [facility]  [address] [address]
9323  06 Sep, 2018  Essential hypertension I10 and Arthritis M19.90

 

 [place] [address] [place] [address] 85173-
5057  28 Aug, 2018   

 

 [facility]  [id number] [place] [address] 17755-
7337  16 Aug, 2018   

 

 [facility]  [id number] [place] [address] 18217-
3232  06 Aug, 2018   

 

 [facility]  [id number] [place] [address] 57336-
9174    Essential hypertension I10

 

 [place] [id number] [place] [address] 39530-
0921    Type 2 diabetes mellitus without complication, without long-
term current use of insulin E11.9 ; Essential hypertension I10 and Anemia of 
chronic disease D63.8

 

 [place] [id number] [place] [address] 16011-
9273    Arthritis M19.90 and Essential hypertension I10

 

 [place] [id number] [place] [address] 77609-
3581  15 May, 2018   

 

 [place] [id number] [place] [address] 40746-
9585  15 May, 2018   

 

 [place] [id number] [place] [address] 98952-
3051  15 May, 2018  Medicare annual wellness visit, initial Z00.00 ; Type 2 
diabetes mellitus without complication, without long-term current use of 
insulin E11.9 ; Chronic kidney disease, stage 3 (moderate) N18.3 ; Essential 
hypertension I10 ; Gastroesophageal reflux disease, esophagitis presence not 
specified K21.9 ; Anxiety F41.9 ; Arthritis M19.90 and Encounter for 
immunization Z23

 

 [place] [id number] [place] [address] 54338-
9712  03 May, 2018  Essential hypertension I10

 

 [place] [id number] [place] [address] 92429-
8382    Arthritis M19.90

 

 [place] [address] [place] [address]-
4985     

 

 [facility]  [id number] [place] [address] 61737-
9306     

 

 [facility]  [id number] [place] [address] 31810-
5520    Essential hypertension I10

 

 [facility]  [address] [phone]
[phone]  27 Mar, 2018  Dysuria R30.0 and Vaginal candida B37.3

 

 [place] [address] [address]-
9348  08 Mar, 2018  Arthritis M19.90

 

 [place] [address] 18073-
1524     

 

 [place] [address] 9[address]01-
5951    Type 2 diabetes mellitus without complication, without long-
term current use of insulin E11.9 ; Lumbago with sciatica, left side M54.42 ; 
Arthritis M19.90 ; Iron deficiency anemia, unspecified iron deficiency anemia 
type D50.9 and BMI 40.0-44.9, adult Z68.41

 

 [place] [id number] [place] [address] 31623-
3263     

 

 [place] [id number] [place] [address] 60919-
2827     

 

 [place] [address] [place] [address] 11967-
5121    Arthritis M19.90 and Anxiety F41.9

 

 [place] [address] 14250-
4890     

 

 [place] [id number] [place] [address] 96341-
6431  18 Dec, 2017  Anxiety F41.9

 

 [place] [address] 34018-
1034     

 

 [facility]  [id number] [place] [address] 14175-
5559     

 

 [facility]  [id number] [place] [address] 05246-
4222    Vaginal yeast infection B37.3

 

 [facility]  [id number] [place] [address] 43548-
9032    Anxiety F41.9

 

 [facility]  [address] [address]-
9565    Type 2 diabetes mellitus without complication, without long-
term current use of insulin E11.9

 

 [facility]  [id number] [place] [address] 47260-
6431     

 

 [facility]  [id number] [place] [address] 41531-
9053     

 

 [facility]  [id number] [place] [address] 39942-
9636  24 Oct, 2017  Arthritis M19.90

 

 [facility]  [id number] [place] [address] 46376-
7745  16 Oct, 2017   

 

 [facility]  [id number] [place] [address] 00272-
9638  04 Oct, 2017   

 

 [facility]  [id number] [place] [address] 13198-
5737  05 Sep, 2017   

 

 [facility]  [id number] [place] [address] 33575-
9927  25 Aug, 2017  Venous insufficiency I87.2 and Venous stasis dermatitis of 
right lower extremity I87.2

 

 [facility]  [id number] [place] [address] 11308-
3275  21 Aug, 2017  Essential hypertension I10

 

 [facility]  [id number] [place] [address] 80907-
2923     

 

 [facility]  [address] [place] [address]-
4267    Type 2 diabetes mellitus without complication, without long-
term current use of insulin E11.9 and Essential hypertension I10

 

 [place] [address] [place] [address] 81914-
0294    Essential hypertension I10 and Type 2 diabetes mellitus 
without complication, without long-term current use of insulin E11.9

 

 [place] [address] [address]-
6317    Chronic kidney disease, stage 3 (moderate) N18.3 ; Anemia 
of chronic disease D63.8 and Type 2 diabetes mellitus without complication, 
without long-term current use of insulin E11.9

 

 [place] [address] [place] [address] 59877-
9266    Type 2 diabetes mellitus without complication, without long-
term current use of insulin E11.9 ; Iron deficiency anemia secondary to 
inadequate dietary iron intake D50.8 ; Arthritis M19.90 and Lumbago with 
sciatica, left side M54.42

 

 [place] [address] [place] [address] 31373-
5769    Arthritis M19.90 and Anxiety F41.9

 

 [place] [address] [place] [address] 43675-
6648    Type 2 diabetes mellitus without complication, without long-
term current use of insulin E11.9 and Essential hypertension I10

 

 [place] [address] [place] [address] 91022-
6352  22 May, 2017  Anxiety F41.9

 

 [place] [address] [place] [address] 52868-
3407  18 May, 2017  Monilial rash B37.2

 

 [place] [address] [place] [address] 57659-
2118  16 May, 2017   

 

 [place] [address] [place] [address] 14217-
3113  15 May, 2017   

 

 [place] [address] 98194-
7446  11 May, 2017   

 

 [place] [address] 42752-
8203  11 May, 2017  Gastroesophageal reflux disease, esophagitis presence not 
specified K21.9

 

 [place] [address] 57331-
8287  09 May, 2017  Arthritis M19.90

 

 [place] [address] 12151-
7469  09 May, 2017  Anxiety F41.9 ; Arthritis M19.90 and Essential hypertension 
I10

 

 [place] [address] 392290-
9823  05 May, 2017  Essential hypertension I10 and Anxiety F41.9

 

 [place] [address] 45722-
9060     

 

 [place] [address] 02481-
0687    Arthritis M19.90 and Anxiety F41.9

 

 [place] [address] 16688-
0463    Type 2 diabetes mellitus without complication, without long-
term current use of insulin E11.9 ; Cellulitis of right lower extremity L03.115 
and Arthritis M19.90

 

 [place] [address] 87137-
8046  28 Mar, 2017   

 

 [place] [address] 73747-
3261  20 Mar, 2017  Type 2 diabetes mellitus without complication, without long-
term current use of insulin E11.9 ; Bronchitis J40 and Arthritis M19.90

 

 [place] [address] 96045-
9406  16 Mar, 2017   

 

 [place] [address] 69866-
6163    Anxiety F41.9

 

 [facility] [place] [address] [address]
-[phone]    Dysuria R30.0 ; Acute cystitis with hematuria N30.01 and 
Vaginal yeast infection B37.3

 

 [place] [address] [address]008-
5406    Arthritis M19.90

 

 [facility] [place] [address] [address]
-[phone]    Strep pharyngitis J02.0 and Sore throat J02.9

 

 [address] [address]-
0274     

 

 [place] [address] 34483-
4737    Type 2 diabetes mellitus without complication, without long-
term current use of insulin E11.9

 

 [address] 6[address]39-
7543  14 Dec, 2016   

 

 [address] [address]-
3262  13 Dec, 2016  Seasonal allergic rhinitis due to pollen J30.1

 

 [facility] [place] [address] [address]
-8689  09 Dec, 2016  Impacted cerumen of right ear H61.21 and Seasonal allergic 
rhinitis due to pollen J30.1

 

 [place] [address] 79256-
4672  09 Dec, 2016   

 

 [place] [address] 15364-
8140  05 Dec, 2016   

 

 [address] [address]-
4134    Type 2 diabetes mellitus without complication, without long-
term current use of insulin E11.9 ; Anxiety F41.9 ; Essential hypertension I10 
and Encounter for immunization Z23

 

 [address] [place] [address]-
9116    Essential hypertension I10

 

 [facility]  [address] [address]-
4125     

 

 [facility]  [address] [address]-
3318     

 

 [facility]  [address] [place] [address] 86143-
8281    Essential hypertension I10

 

 [facility]  [id number] [place] [address] 25920-
3875     

 

 [facility]  [id number] [place] [address] 68441-
6679  23 Sep, 2016   

 

 [facility]  [address] [place] [address] 79795-
2457  01 Sep, 2016   

 

 [facility]  [address] [place] [address] 85656-
0069  30 Aug, 2016   

 

 [facility]  [address] [place] [address] 51095-
1132  24 Aug, 2016   

 

 [facility]  [address] [place] [address] 76986-
8853  22 Aug, 2016   

 

 [facility]  [address] [address]-
3169  22 Aug, 2016  Type 2 diabetes mellitus without complication, without long-
term current use of insulin E11.9 ; Essential hypertension I10 ; Acute non-
recurrent maxillary sinusitis J01.00 ; Arthritis M19.90 ; Lumbago with sciatica
, left side M54.42 ; Other chronic pain G89.29 and Anxiety F41.9







IMMUNIZATIONS

No Known Immunizations



SOCIAL HISTORY

Never Assessed



REASON FOR VISIT

Controlled Med Refill 



PLAN OF CARE





VITAL SIGNS





MEDICATIONS







 Medication  Instructions  Dosage  Frequency  Start Date  End Date  Duration  
Status

 

 Alprazolam 0.5 MG  Orally Three times a day  1 tablet  8h        30 days  
Active







RESULTS

No Results



PROCEDURES

No Known procedures



INSTRUCTIONS





MEDICATIONS ADMINISTERED

No Known Medications



MEDICAL (GENERAL) HISTORY







 Type  Description  Date

 

 Medical History  type II diabetes   

 

 Medical History  hypertension   

 

 Medical History  Arthritis   

 

 Medical History  skin cancer-scalp   

 

 Surgical History  hysterectomy   

 

 Surgical History  skin cancer removal-scalp   

 

 Hospitalization History  Surgery(s) only   

 

 Hospitalization History  dehydration  2017

 

 Hospitalization History  bronchitis  2107

## 2019-02-08 NOTE — XMS REPORT
[facility]

 Created on: 2018



Sandrita Stoddard

External Reference #: [id number]

: 1934

Sex: Female



Demographics







 Address  [address] [place]  [address]

 

 Preferred Language  Unknown

 

 Marital Status  Unknown

 

 Sikhism Affiliation  Unknown

 

 Race  Unknown

 

 Ethnic Group  Unknown





Author







 Author  YULISA RANGEL

 

 Organization  [facility]

 

 Address  [address] [place]  [address]



 

 Phone  [phone]







Care Team Providers







 Care Team Member Name  Role  Phone

 

 YULISA RANGEL  Unavailable  [phone]







PROBLEMS







 Type  Condition  ICD9-CM Code  VLC58-CV Code  Onset Dates  Condition Status  
SNOMED Code

 

 Problem  Essential hypertension     I10     Active  33166274

 

 Problem  Type 2 diabetes mellitus without complication, without long-term 
current use of insulin     E11.9     Active  265443548

 

 Problem  Anxiety     F41.9     Active  56706769

 

 Problem  Lumbago with sciatica, left side     M54.42     Active  646183928

 

 Problem  Arthritis     M19.90     Active  3546164

 

 Problem  Iron deficiency anemia, unspecified iron deficiency anemia type     
D50.9     Active  41756888

 

 Problem  Venous insufficiency     I87.2     Active  61408949

 

 Problem  Gastroesophageal reflux disease, esophagitis presence not specified  
   K21.9     Active  477990370

 

 Problem  Seasonal allergic rhinitis due to pollen     J30.1     Active  
07338206

 

 Problem  Anemia of chronic disease     D63.8     Active  578247693

 

 Problem  Chronic kidney disease, stage 3 (moderate)     N18.3     Active  
721891142







ALLERGIES

No Information



ENCOUNTERS







 Encounter  Location  Date  Diagnosis

 

 [place] [address] [place] [address] [phone]
[phone]  15 May, 2018   

 

 [facility]  [address] [place] [id number] [place] [address] [phone]
[phone]  15 May, 2018   

 

 [facility]  [address] [place] [address] [phone]
[phone]  15 May, 2018  Medicare annual wellness visit, initial Z00.00 ; Type 2 
diabetes mellitus without complication, without long-term current use of 
insulin E11.9 ; Chronic kidney disease, stage 3 (moderate) N18.3 ; Essential 
hypertension I10 ; Gastroesophageal reflux disease, esophagitis presence not 
specified K21.9 ; Anxiety F41.9 ; Arthritis M19.90 and Encounter for 
immunization Z23

 

 [place] [address] [place] [address] [phone]-
[phone]  03 May, 2018  Essential hypertension I10

 

 [facility]  [address] [phone]
[phone]    Arthritis M19.90

 

 [facility]  [address] [place] [id number] [place] [address] [phone]
[phone]     

 

 [facility]  [address] [phone]-
[phone]     

 

 [facility]  [address] [place] [id number] [place] [address] [phone]
[phone]    Essential hypertension I10

 

 [facility]  [address] [place] [id number] [place] [address] [phone]
[phone]  27 Mar, 2018  Dysuria R30.0 and Vaginal candida B37.3

 

 [place] [address] [phone]
[phone]  08 Mar, 2018  Arthritis M19.90

 

 [facility]  [address] [place] [id number] [place] [address] [phone]
[phone]     

 

 [place] [address] [place] [id number] [place] [address] [phone]-
[phone]    Type 2 diabetes mellitus without complication, without long-
term current use of insulin E11.9 ; Lumbago with sciatica, left side M54.42 ; 
Arthritis M19.90 ; Iron deficiency anemia, unspecified iron deficiency anemia 
type D50.9 and BMI 40.0-44.9, adult Z68.41

 

 [facility]  [address] [place] [id number] [place] [address] [phone]-
[phone]     

 

 [place] [address] [place] [id number] [place] [address] [phone]-
[phone]     

 

 [place] [address] [place] [id number] [place] [address] [phone]-
[phone]    Arthritis M19.90 and Anxiety F41.9

 

 [place] [address] [place] [id number] [place] [address] [phone]-
[phone]     

 

 [facility]  [address] [place] [id number] [place] [address] [phone]
[phone]  18 Dec, 2017  Anxiety F41.9

 

 [facility]  [address] [place] [id number] [place] [address] [phone]
[phone]     

 

 [facility]  [address] [phone]
[phone]     

 

 [facility]  [address] [place] [id number] [place] [address] [phone]-
[phone]    Vaginal yeast infection B37.3

 

 [facility]  [address] [place] [id number] [place] [address] [phone]
[phone]    Anxiety F41.9

 

 [facility]  [address] [place] [id number] [place] [address] [phone]
[phone]    Type 2 diabetes mellitus without complication, without long-
term current use of insulin E11.9

 

 [facility]  [address] [place] [id number] [place] [address] [phone]
[phone]     

 

 [facility]  [address] [place] [id number] [place] [address] [phone]
[phone]     

 

 [facility]  [address] [place] [id number] [place] [address] [phone]
[phone]  24 Oct, 2017  Arthritis M19.90

 

 [facility]  [address] [place] [id number] [place] [address] [phone]
[phone]  16 Oct, 2017   

 

 [facility]  [address] [place] [id number] [place] [address] [phone]
[phone]  04 Oct, 2017   

 

 [facility]  [address] [place] [id number] [place] [address] [phone]
[phone]  05 Sep, 2017   

 

 [facility]  [address] [place] [address] [phone]
[phone]  25 Aug, 2017  Venous insufficiency I87.2 and Venous stasis dermatitis of 
right lower extremity I87.2

 

 [place] [address] [place] [address] [phone]
[phone]  21 Aug, 2017  Essential hypertension I10

 

 [place] [address] [place] [id number] [place] [address] [phone]
[phone]     

 

 [place] [address] [place] [id number] [place] [address] [phone]-
[phone]    Type 2 diabetes mellitus without complication, without long-
term current use of insulin E11.9 and Essential hypertension I10

 

 [place] [address] [place] [id number] [place] [address] [phone]-
[phone]    Essential hypertension I10 and Type 2 diabetes mellitus 
without complication, without long-term current use of insulin E11.9

 

 [place] [address] [place] [id number] [place] [address] 44245-
3302    Chronic kidney disease, stage 3 (moderate) N18.3 ; Anemia 
of chronic disease D63.8 and Type 2 diabetes mellitus without complication, 
without long-term current use of insulin E11.9

 

 [place] [address] [place] [id number] [place] [address] 19650-
[phone]    Type 2 diabetes mellitus without complication, without long-
term current use of insulin E11.9 ; Iron deficiency anemia secondary to 
inadequate dietary iron intake D50.8 ; Arthritis M19.90 and Lumbago with 
sciatica, left side M54.42

 

 [place] [id number] [place] [address] 16474-
1309    Arthritis M19.90 and Anxiety F41.9

 

 [place] [address] [place] [address] 05868-
8050    Type 2 diabetes mellitus without complication, without long-
term current use of insulin E11.9 and Essential hypertension I10

 

 [place] [address] [place] [address] [phone]-
3108  22 May, 2017  Anxiety F41.9

 

 [place] [address] [place] [id number] [place] [address] [phone]-
[phone]  18 May, 2017  Monilial rash B37.2

 

 [facility]  [address] [place] [id number] [place] [address] [phone]
[phone]  16 May, 2017   

 

 [facility]  [address] [place] [id number] [place] [address] [phone]-
[phone]  15 May, 2017   

 

 [facility]  [address] [place] [id number] [place] [address] [phone]-
[phone]  11 May, 2017   

 

 [facility]  [address] [place] [id number] [place] [address] [phone]-
[phone]  11 May, 2017  Gastroesophageal reflux disease, esophagitis presence not 
specified K21.9

 

 [place] [address] [place] [id number] [place] [address] [phone]-
6225  09 May, 2017  Arthritis M19.90

 

 [place] [address] [place] [id number] [place] [address] [phone]-
0279  09 May, 2017  Anxiety F41.9 ; Arthritis M19.90 and Essential hypertension 
I10

 

 [place] [address] [place] [id number] [place] [address] [phone]-
[phone]  05 May, 2017  Essential hypertension I10 and Anxiety F41.9

 

 [place] [address] [place] [id number] [place] [address] [phone]-
[phone]     

 

 [place] [address] [place] [id number] [place] [address] 02638-
6929    Arthritis M19.90 and Anxiety F41.9

 

 [place] [address] [place] [id number] [place] [address] [phone]-
[phone]    Type 2 diabetes mellitus without complication, without long-
term current use of insulin E11.9 ; Cellulitis of right lower extremity L03.115 
and Arthritis M19.90

 

 [place] [address] [place] [id number] [place] [address] [phone]-
[phone]  28 Mar, 2017   

 

 [place] [address] [place] [id number] [place] [address] 35715-
[phone]  20 Mar, 2017  Type 2 diabetes mellitus without complication, without long-
term current use of insulin E11.9 ; Bronchitis J40 and Arthritis M19.90

 

 [place] [address] [place] [id number] [place] [address] [phone]-
[phone]  16 Mar, 2017   

 

 [place] [address] [place] [id number] [place] [address] [phone]-
2760    Anxiety F41.9

 

 [facility] WALK IN [place] [address] [phone]
[phone]    Dysuria R30.0 ; Acute cystitis with hematuria N30.01 and 
Vaginal yeast infection B37.3

 

 [place] [address] [place] [id number] [place] [address] 63348-
[phone]    Arthritis M19.90

 

 [facility] WALK IN [place] [address] [place] [id number] [place] [address] [phone]
[phone]    Strep pharyngitis J02.0 and Sore throat J02.9

 

 [place] [address] [phone]-
[phone]     

 

 [place] [address] [place] [address] [place] [address] 69540-
[phone]    Type 2 diabetes mellitus without complication, without long-
term current use of insulin E11.9

 

 [place] [address] [place] [address] [place] [address] [phone]-
0287  14 Dec, 2016   

 

 [place] [address] [place] [id number] [place] [address] [phone]-
[phone]  13 Dec, 2016  Seasonal allergic rhinitis due to pollen J30.1

 

 [facility] WALK IN [place] [address] [place] [id number] [place] [address] [phone]
[phone]  09 Dec, 2016  Impacted cerumen of right ear H61.21 and Seasonal allergic 
rhinitis due to pollen J30.1

 

 [place] [address] [place] [address] [place] [address] [phone]-
[phone]  09 Dec, 2016   

 

 [place] [address] [phone]-
[phone]  05 Dec, 2016   

 

 [facility]  [address] [address]-
2123    Type 2 diabetes mellitus without complication, without long-
term current use of insulin E11.9 ; Anxiety F41.9 ; Essential hypertension I10 
and Encounter for immunization Z23

 

 [facility]  [address] [place] [id number] [place] [address] [phone]-
[phone]    Essential hypertension I10

 

 [facility]  [address] [place] [id number] [place] [address] [phone]-
[phone]     

 

 [facility]  [address] [place] [id number] [place] [address] [phone]-
[phone]     

 

 [facility]  [address] [place] [id number] [place] [address] [phone]-
[phone]    Essential hypertension I10

 

 [facility]  [address] [place] [id number] [place] [address] [phone]-
[phone]     

 

 [facility]  [address] [place] [id number] [place] [address] [phone]-
[phone]  23 Sep, 2016   

 

 [facility]  [address] [place] [id number] [place] [address] [phone]
[phone]  01 Sep, 2016   

 

 [facility]  [address] [place] [id number] [place] [address] [phone]
[phone]  30 Aug, 2016   

 

 [facility]  [address] [place] [address] [phone]-
[phone]  24 Aug, 2016   

 

 [facility]  [address] [place] [id number] [place] [address] [phone]
[phone]  22 Aug, 2016   

 

 [facility]  [address] [place] [id number] [place] [address] [phone]-
[phone]  22 Aug, 2016  Type 2 diabetes mellitus without complication, without long-
term current use of insulin E11.9 ; Essential hypertension I10 ; Acute non-
recurrent maxillary sinusitis J01.00 ; Arthritis M19.90 ; Lumbago with sciatica
, left side M54.42 ; Other chronic pain G89.29 and Anxiety F41.9







IMMUNIZATIONS

No Known Immunizations



SOCIAL HISTORY

Never Assessed



REASON FOR VISIT

Ultracet- 10/24



PLAN OF CARE





VITAL SIGNS





MEDICATIONS







 Medication  Instructions  Dosage  Frequency  Start Date  End Date  Duration  
Status

 

 Tramadol-Acetaminophen 37.5-325 MG  Orally every 4 hrs  2 tablets as needed  
4h        28 days  Active







RESULTS

No Results



PROCEDURES

No Known procedures



INSTRUCTIONS





MEDICATIONS ADMINISTERED

No Known Medications



MEDICAL (GENERAL) HISTORY







 Type  Description  Date

 

 Medical History  type II diabetes   

 

 Medical History  hypertension   

 

 Medical History  Arthritis   

 

 Medical History  skin cancer-scalp   

 

 Surgical History  hysterectomy   

 

 Surgical History  skin cancer removal-scalp   

 

 Hospitalization History  Surgery(s) only   

 

 Hospitalization History  dehydration  2017

 

 Hospitalization History  bronchitis  2107

## 2019-02-08 NOTE — XMS REPORT
[facility]

 Created on: 2018



Richi Sandrita

External Reference #: [id number]

: 1934

Sex: Female



Demographics







 Address  [address] [place]  [address]

 

 Preferred Language  Unknown

 

 Marital Status  Unknown

 

 Jewish Affiliation  Unknown

 

 Race  Unknown

 

 Ethnic Group  Unknown





Author







 Author  YULISA RANGEL

 

 [facility]

 

 Address  [address] [place]  [address]



 

 Phone  [phone]







Care Team Providers







 Care Team Member Name  Role  Phone

 

 YULISA RANGEL  Unavailable  [phone]







PROBLEMS







 Type  Condition  ICD9-CM Code  GIL49-ZV Code  Onset Dates  Condition Status  
SNOMED Code

 

 Problem  Essential hypertension     I10     Active  20175306

 

 Problem  Type 2 diabetes mellitus without complication, without long-term 
current use of insulin     E11.9     Active  574854231

 

 Problem  Anxiety     F41.9     Active  57489200

 

 Problem  Lumbago with sciatica, left side     M54.42     Active  569753649

 

 Problem  Arthritis     M19.90     Active  7041355

 

 Problem  Iron deficiency anemia, unspecified iron deficiency anemia type     
D50.9     Active  39047410

 

 Problem  Venous insufficiency     I87.2     Active  68929528

 

 Problem  Gastroesophageal reflux disease, esophagitis presence not specified  
   K21.9     Active  921137111

 

 Problem  Seasonal allergic rhinitis due to pollen     J30.1     Active  
05451973

 

 Problem  Anemia of chronic disease     D63.8     Active  662905458

 

 Problem  Chronic kidney disease, stage 3 (moderate)     N18.3     Active  
437199343







ALLERGIES

No Information



ENCOUNTERS







 Encounter  Location  Date  Diagnosis

 

 [place] [address] [place] [address]-
5857  06 Sep, 2018  Essential hypertension I10 and Arthritis M19.90

 

 [place] [address] [address]
2528  28 Aug, 2018   

 

 [place] [address] [place] [id number] [place] [address]-
1820  16 Aug, 2018   

 

 [place] [address] [place] [id number] [place] [address]206-
9414  06 Aug, 2018   

 

 [place] [address], [place] [address]
5247    Essential hypertension I10

 

 [place] [address] [place] [id number] [place] [address]-
4047    Type 2 diabetes mellitus without complication, without long-
term current use of insulin E11.9 ; Essential hypertension I10 and Anemia of 
chronic disease D63.8

 

 [place] [address] [place] [id number] [place] [address]-
9819    Arthritis M19.90 and Essential hypertension I10

 

 [place] [address] [address]-
2750  15 May, 2018   

 

 [place] [address] [address]-
9172  15 May, 2018   

 

 [place] [address] [address]-
3601  15 May, 2018  Medicare annual wellness visit, initial Z00.00 ; Type 2 
diabetes mellitus without complication, without long-term current use of 
insulin E11.9 ; Chronic kidney disease, stage 3 (moderate) N18.3 ; Essential 
hypertension I10 ; Gastroesophageal reflux disease, esophagitis presence not 
specified K21.9 ; Anxiety F41.9 ; Arthritis M19.90 and Encounter for 
immunization Z23

 

 [place] [address] [address]-
0263  03 May, 2018  Essential hypertension I10

 

 [place] [address] [address]-
3802    Arthritis M19.90

 

 [place] [address] [place] [id number] [place] [address]-
5638     

 

 [place] [address] [address]-
2006     

 

 [place] [address] [address]-
7865    Essential hypertension I10

 

 [facility]  [address] [place] [address] [id number] [place] [address]
-[phone]  27 Mar, 2018  Dysuria R30.0 and Vaginal candida B37.3

 

 [place] [address] [place] [id number] [place] [address] 10338-
1266  08 Mar, 2018  Arthritis M19.90

 

 [place] [address] [address]-
2145     

 

 [place] [address] [place] [id number] [place] [address] 91219-
5326    Type 2 diabetes mellitus without complication, without long-
term current use of insulin E11.9 ; Lumbago with sciatica, left side M54.42 ; 
Arthritis M19.90 ; Iron deficiency anemia, unspecified iron deficiency anemia 
type D50.9 and BMI 40.0-44.9, adult Z68.41

 

 [place] [address] [address]-
5851     

 

 [place] [address] [address]-
2545     

 

 [place] [address] [address]-
4934    Arthritis M19.90 and Anxiety F41.9

 

 [place] [address] [address]-
4732     

 

 [place] [address] [place] [id number] [place] [address]-
4170  18 Dec, 2017  Anxiety F41.9

 

 [place] [address] [address]-
8737     

 

 [place] [address] [place] [id number] [place] [address] 80058-
7132     

 

 [place] [address] [address]-
9222  17 2017  Vaginal yeast infection B37.3

 

 [place] [address] [place] [id number] [place] [address] 77363-
3524  14 2017  Anxiety F41.9

 

 [place] [address] 8[address]-
0076    Type 2 diabetes mellitus without complication, without long-
term current use of insulin E11.9

 

 [place] [address] [place] [id number] [place] [address] 56151-
6797     

 

 [facility]  [address] [place] [address] [id number] [place] [address] 98072-
8428     

 

 [facility]  [address] [place] [id number] [place] [address] 13968-
1288  24 Oct, 2017  Arthritis M19.90

 

 [place] [address] [place] [id number] [place] [address] 26718-
1678  16 Oct, 2017   

 

 [place] [address] [place] [id number] [place] [address] 39543-
5783  04 Oct, 2017   

 

 [place] [address] [place] [id number] [place] [address] 61802-
8690  05 Sep, 2017   

 

 [place] [address] [place] [id number] [place] [address] 63071-
9212  25 Aug, 2017  Venous insufficiency I87.2 and Venous stasis dermatitis of 
right lower extremity I87.2

 

 [place] [address] [place] [id number] [place] [address] 93435-
5863  21 Aug, 2017  Essential hypertension I10

 

 [place] [address] [place] [id number] [place] [address] 31677-
9048     

 

 [place] [address] [place] [id number] [place] [address] 56175-
6943    Type 2 diabetes mellitus without complication, without long-
term current use of insulin E11.9 and Essential hypertension I10

 

 [place] [address] [place] [id number] [place] [address] 58159-
2569    Essential hypertension I10 and Type 2 diabetes mellitus 
without complication, without long-term current use of insulin E11.9

 

 [place] [address] [address]-
2153    Chronic kidney disease, stage 3 (moderate) N18.3 ; Anemia 
of chronic disease D63.8 and Type 2 diabetes mellitus without complication, 
without long-term current use of insulin E11.9

 

 [place] [address] [address]-
3546  14 2017  Type 2 diabetes mellitus without complication, without long-
term current use of insulin E11.9 ; Iron deficiency anemia secondary to 
inadequate dietary iron intake D50.8 ; Arthritis M19.90 and Lumbago with 
sciatica, left side M54.42

 

 [place] [address] [address]-
6557    Arthritis M19.90 and Anxiety F41.9

 

 [place] [address] [address]-
2006    Type 2 diabetes mellitus without complication, without long-
term current use of insulin E11.9 and Essential hypertension I10

 

 [address] [address]-
2177  22 May, 2017  Anxiety F41.9

 

 [place] [address] [address]-
5306  18 May, 2017  Monilial rash B37.2

 

 [place] [address] [address]-
7452  16 May, 2017   

 

 [place] [address] [place] [id number] [place] [address] 19379-
4912  15 May, 2017   

 

 [place] [address] [address]-
5364  11 May, 2017   

 

 [place] [address] [address]-
5916  11 May, 2017  Gastroesophageal reflux disease, esophagitis presence not 
specified K21.9

 

 [place] [address] [address]-
7710  09 May, 2017  Arthritis M19.90

 

 [place] [address] [address]-
9323  09 May, 2017  Anxiety F41.9 ; Arthritis M19.90 and Essential hypertension 
I10

 

 [place] [address] [place] [id number] [place] [address] 63529-
7961  05 May, 2017  Essential hypertension I10 and Anxiety F41.9

 

 [place] [address] [place] [id number] [place] [address] 44060-
8874     

 

 [place] [address] [address]-
5706    Arthritis M19.90 and Anxiety F41.9

 

 [place] [address] [address]-
1460    Type 2 diabetes mellitus without complication, without long-
term current use of insulin E11.9 ; Cellulitis of right lower extremity L03.115 
and Arthritis M19.90

 

 [place] [address] [place] [address] [place] [address] 76349-
0052  28 Mar, 2017   

 

 [place] [address] [address]-
5652  20 Mar, 2017  Type 2 diabetes mellitus without complication, without long-
term current use of insulin E11.9 ; Bronchitis J40 and Arthritis M19.90

 

 [place] [address] [place] [id number] [place] [address] 75464-
3955  16 Mar, 2017   

 

 [place] [address] [place] [address] [place] [address] 32813-
0478    Anxiety F41.9

 

 [facility]  [address] [place] [address]
[phone]    Dysuria R30.0 ; Acute cystitis with hematuria N30.01 and 
Vaginal yeast infection B37.3

 

 [place] [address] [place] [address] [place] [address] 34397-
2549    Arthritis M19.90

 

 [facility]  [address] [place] [address] [place] [address] 20901
-8761    Strep pharyngitis J02.0 and Sore throat J02.9

 

 [facility]  [address] [place] [address] [place] [address] 99407-
7817     

 

 [place] [address] [address]-
2389    Type 2 diabetes mellitus without complication, without long-
term current use of insulin E11.9

 

 [place] [address] 6[address]-
7942  14 Dec, 2016   

 

 [place] [address] [address]-
5292  13 Dec, 2016  Seasonal allergic rhinitis due to pollen J30.1

 

 [facility]  [address] [address]
-[phone]  09 Dec, 2016  Impacted cerumen of right ear H61.21 and Seasonal allergic 
rhinitis due to pollen J30.1

 

 [place] [address] [address]-
9404  09 Dec, 2016   

 

 [place] [address] 7[address]-
7527  05 Dec, 2016   

 

 [place] [address] [address]-
0894    Type 2 diabetes mellitus without complication, without long-
term current use of insulin E11.9 ; Anxiety F41.9 ; Essential hypertension I10 
and Encounter for immunization Z23

 

 [place] [address] [place] [address] [place] [address] 43659-
9523    Essential hypertension I10

 

 [place] [address] [place] [address] [place] [address] 79418-
6685     

 

 [place] [address] [address]-
0929     

 

 [place] [address] 9[address]-
8070    Essential hypertension I10

 

 [place] [address] [place] [address]-
2656     

 

 [facility]  [id number], [place] [address]-
5720  23 Sep, 2016   

 

 [facility]  [id number], [place] [address]-
7476  01 Sep, 2016   

 

 [facility]  [id number], [place] [address]-
6385  30 Aug, 2016   

 

 [facility]  [address] [address]-
6228  24 Aug, 2016   

 

 [facility]  [address], [place] 982318-
3465  22 Aug, 2016   

 

 [facility]  [address], [place] [address]-
8356  22 Aug, 2016  Type 2 diabetes mellitus without complication, without long-
term current use of insulin E11.9 ; Essential hypertension I10 ; Acute non-
recurrent maxillary sinusitis J01.00 ; Arthritis M19.90 ; Lumbago with sciatica
, left side M54.42 ; Other chronic pain G89.29 and Anxiety F41.9







IMMUNIZATIONS

No Known Immunizations



SOCIAL HISTORY

Never Assessed



REASON FOR VISIT

Requests return call



PLAN OF CARE





VITAL SIGNS





MEDICATIONS

Unknown Medications



RESULTS

No Results



PROCEDURES

No Known procedures



INSTRUCTIONS





MEDICATIONS ADMINISTERED

No Known Medications



MEDICAL (GENERAL) HISTORY







 Type  Description  Date

 

 Medical History  type II diabetes   

 

 Medical History  hypertension   

 

 Medical History  Arthritis   

 

 Medical History  skin cancer-scalp   

 

 Surgical History  hysterectomy   

 

 Surgical History  skin cancer removal-scalp   

 

 Hospitalization History  Surgery(s) only   

 

 Hospitalization History  dehydration  2017

 

 Hospitalization History  bronchitis  2107

## 2019-02-08 NOTE — XMS REPORT
[facility]

 Created on: 2017



Sandrita Stoddard

External Reference #: [id number]

: 1934

Sex: Female



Demographics







 Address  [address] [place]  [address]

 

 Preferred Language  Unknown

 

 Marital Status  Unknown

 

 Yarsani Affiliation  Unknown

 

 Race  Unknown

 

 Ethnic Group  Unknown





Author







 Author  YULISA RANGEL

 

 [facility]

 

 Address  [address] [place]  [address]



 

 Phone  [phone]







Care Team Providers







 Care Team Member Name  Role  Phone

 

 YULISA RANGEL  Unavailable  [phone]







PROBLEMS







 Type  Condition  ICD9-CM Code  XRZ38-ME Code  Onset Dates  Condition Status  
SNOMED Code

 

 Problem  Arthritis     M19.90     Active  9853639

 

 Problem  Anxiety     F41.9     Active  26232999

 

 Problem  Essential hypertension     I10     Active  65827075

 

 Problem  Lumbago with sciatica, left side     M54.42     Active  875910080

 

 Problem  Venous insufficiency     I87.2     Active  03460245

 

 Problem  Anemia of chronic disease     D63.8     Active  248772785

 

 Problem  Seasonal allergic rhinitis due to pollen     J30.1     Active  
94855505

 

 Problem  Type 2 diabetes mellitus without complication, without long-term 
current use of insulin     E11.9     Active  551401991

 

 Problem  Chronic kidney disease, stage 3 (moderate)     N18.3     Active  
782952712

 

 Problem  Gastroesophageal reflux disease, esophagitis presence not specified  
   K21.9     Active  436108063







ALLERGIES

No Information



SOCIAL HISTORY

Never Assessed



PLAN OF CARE





VITAL SIGNS





MEDICATIONS







 Medication  Instructions  Dosage  Frequency  Start Date  End Date  Duration  
Status

 

 Nexium 40 mg  Orally Once a day  1 capsule  24h  11 May, 2017     30 day(s)  
Active







RESULTS

No Results



PROCEDURES

No Known procedures



IMMUNIZATIONS

No Known Immunizations



MEDICAL (GENERAL) HISTORY







 Type  Description  Date

 

 Medical History  type II diabetes   

 

 Medical History  hypertension   

 

 Medical History  Arthritis   

 

 Medical History  skin cancer-scalp   

 

 Surgical History  hysterectomy   

 

 Surgical History  skin cancer removal-scalp   

 

 Hospitalization History  Surgery(s) only   

 

 Hospitalization History  dehydration  2017

 

 Hospitalization History  bronchitis  2107

## 2019-02-08 NOTE — XMS REPORT
[facility]

 Created on: 2017



Sandrita Stoddard

External Reference #: [id number]

: 1934

Sex: Female



Demographics







 Address  [address] [place]  [address]

 

 Preferred Language  Unknown

 

 Marital Status  Unknown

 

 Christianity Affiliation  Unknown

 

 Race  Unknown

 

 Ethnic Group  Unknown





Author







 Author  YULISA RANGEL

 

 [facility]

 

 Address  [address] [place]  [address]



 

 Phone  [phone]







Care Team Providers







 Care Team Member Name  Role  Phone

 

 YULISA RANGEL  Unavailable  [phone]







PROBLEMS







 Type  Condition  ICD9-CM Code  PPK34-YD Code  Onset Dates  Condition Status  
SNOMED Code

 

 Problem  Arthritis     M19.90     Active  8605390

 

 Problem  Anxiety     F41.9     Active  15259319

 

 Problem  Essential hypertension     I10     Active  74776630

 

 Problem  Lumbago with sciatica, left side     M54.42     Active  406586330

 

 Problem  Venous insufficiency     I87.2     Active  95203669

 

 Problem  Anemia of chronic disease     D63.8     Active  118968243

 

 Problem  Seasonal allergic rhinitis due to pollen     J30.1     Active  
25089919

 

 Problem  Type 2 diabetes mellitus without complication, without long-term 
current use of insulin     E11.9     Active  219584660

 

 Problem  Chronic kidney disease, stage 3 (moderate)     N18.3     Active  
675696015

 

 Problem  Gastroesophageal reflux disease, esophagitis presence not specified  
   K21.9     Active  456797505







ALLERGIES

No Information



SOCIAL HISTORY

Never Assessed



PLAN OF CARE





VITAL SIGNS





MEDICATIONS







 Medication  Instructions  Dosage  Frequency  Start Date  End Date  Duration  
Status

 

 Alprazolam 0.5 MG  Orally Three times a day  1 tablet  8h        30 days  
Active







RESULTS

No Results



PROCEDURES

No Known procedures



IMMUNIZATIONS

No Known Immunizations



MEDICAL (GENERAL) HISTORY







 Type  Description  Date

 

 Medical History  type II diabetes   

 

 Medical History  hypertension   

 

 Medical History  Arthritis   

 

 Medical History  skin cancer-scalp   

 

 Surgical History  hysterectomy   

 

 Surgical History  skin cancer removal-scalp   

 

 Hospitalization History  Surgery(s) only   

 

 Hospitalization History  dehydration  2017

 

 Hospitalization History  bronchitis  2107

## 2019-02-08 NOTE — XMS REPORT
[facility]

 Created on: 2016



StoddardVladimirna

External Reference #: [id number]

: 1934

Sex: Female



Demographics







 Address  [address] [place]  [address]

 

 Home Phone  [phone]

 

 Preferred Language  Unknown

 

 Marital Status  Unknown

 

 Presybeterian Affiliation  Unknown

 

 Race   or 

 

 Ethnic Group  Not  or 





Author







 Author  YULISA RANGEL

 

 Organization  [employer]

 

 Address  Unknown

 

 Phone  Unavailable







Care Team Providers







 Care Team Member Name  Role  Phone

 

 YULISA RANGEL    Unavailable



                                                                



Allergies

          No Known Allergies                                                   
                                     



Problems

          





 Problem Type  Condition  Code  Onset Dates  Condition Status

 

 Problem  Essential hypertension  I10     Active

 

 Problem  Arthritis  M19.90     Active

 

 Problem  Type 2 diabetes mellitus without complication, without long-term 
current use of insulin  E11.9     Active

 

 Problem  Lumbago with sciatica, left side  M54.42     Active

 

 Problem  Anxiety  F41.9     Active



                                                                               
                                                 



Medications

          





 Medication  Code System  Code  Instructions  Start Date  End Date  Status  
Dosage

 

 Lisinopril  NDC  [address]-71  5 mg Orally Once a day           1 tablet



                                                                              



Results

          No Known Results                                                     
               



Summary Purpose

          [employer] Submission

## 2019-02-08 NOTE — XMS REPORT
[facility]

 Created on: 2017



Sandrita Stoddard

External Reference #: [id number]

: 1934

Sex: Female



Demographics







 Address  [address] [place]  [address]

 

 Preferred Language  Unknown

 

 Marital Status  Unknown

 

 Moravian Affiliation  Unknown

 

 Race  Unknown

 

 Ethnic Group  Unknown





Author







 Author  YULISA RANGLE

 

 [facility]

 

 Address  [address] [place]  [address]



 

 Phone  [phone]







Care Team Providers







 Care Team Member Name  Role  Phone

 

 YULISA RANGEL  Unavailable  [phone]







PROBLEMS







 Type  Condition  ICD9-CM Code  ZRC29-GA Code  Onset Dates  Condition Status  
SNOMED Code

 

 Problem  Arthritis     M19.90     Active  2504191

 

 Problem  Anxiety     F41.9     Active  32438035

 

 Problem  Essential hypertension     I10     Active  85381851

 

 Problem  Lumbago with sciatica, left side     M54.42     Active  163052338

 

 Problem  Venous insufficiency     I87.2     Active  99759917

 

 Problem  Anemia of chronic disease     D63.8     Active  878259564

 

 Problem  Seasonal allergic rhinitis due to pollen     J30.1     Active  
61373266

 

 Problem  Type 2 diabetes mellitus without complication, without long-term 
current use of insulin     E11.9     Active  732762113

 

 Problem  Chronic kidney disease, stage 3 (moderate)     N18.3     Active  
466643970

 

 Problem  Gastroesophageal reflux disease, esophagitis presence not specified  
   K21.9     Active  382907932







ALLERGIES

No Information



SOCIAL HISTORY

Never Assessed



PLAN OF CARE





VITAL SIGNS





MEDICATIONS







 Medication  Instructions  Dosage  Frequency  Start Date  End Date  Duration  
Status

 

 Alprazolam 0.5 MG  Orally Three times a day  1 tablet  8h        30 days  
Active







RESULTS

No Results



PROCEDURES

No Known procedures



IMMUNIZATIONS

No Known Immunizations



MEDICAL (GENERAL) HISTORY







 Type  Description  Date

 

 Medical History  type II diabetes   

 

 Medical History  hypertension   

 

 Medical History  Arthritis   

 

 Medical History  skin cancer-scalp   

 

 Surgical History  hysterectomy   

 

 Surgical History  skin cancer removal-scalp   

 

 Hospitalization History  Surgery(s) only   

 

 Hospitalization History  dehydration  2017

 

 Hospitalization History  bronchitis  2107

## 2019-02-08 NOTE — XMS REPORT
[facility]

 Created on: 2018



Richi Sandrita

External Reference #: [id number]

: 1934

Sex: Female



Demographics







 Address  [address] [place]  [address]

 

 Preferred Language  Unknown

 

 Marital Status  Unknown

 

 Jain Affiliation  Unknown

 

 Race  Unknown

 

 Ethnic Group  Unknown





Author







 Author  YULISA RANGEL

 

 [facility]

 

 Address  [address] [place]  [address]



 

 Phone  [phone]







Care Team Providers







 Care Team Member Name  Role  Phone

 

 YULISA RANGEL  Unavailable  [phone]







PROBLEMS







 Type  Condition  ICD9-CM Code  FRF41-XK Code  Onset Dates  Condition Status  
SNOMED Code

 

 Problem  Essential hypertension     I10     Active  11228601

 

 Problem  Type 2 diabetes mellitus without complication, without long-term 
current use of insulin     E11.9     Active  768821580

 

 Problem  Anxiety     F41.9     Active  95838003

 

 Problem  Lumbago with sciatica, left side     M54.42     Active  493548205

 

 Problem  Arthritis     M19.90     Active  1660277

 

 Problem  Iron deficiency anemia, unspecified iron deficiency anemia type     
D50.9     Active  33775673

 

 Problem  Venous insufficiency     I87.2     Active  93660884

 

 Problem  Gastroesophageal reflux disease, esophagitis presence not specified  
   K21.9     Active  577719402

 

 Problem  Seasonal allergic rhinitis due to pollen     J30.1     Active  
03223061

 

 Problem  Anemia of chronic disease     D63.8     Active  247385115

 

 Problem  Chronic kidney disease, stage 3 (moderate)     N18.3     Active  
[id number]







ALLERGIES

No Information



ENCOUNTERS







 Encounter  Location  Date  Diagnosis

 

 [place] [id number] [place] [address] 13383-
3645    Essential hypertension I10

 

 [place] [id number] [place] [address] 70382-
8600    Type 2 diabetes mellitus without complication, without long-
term current use of insulin E11.9 ; Essential hypertension I10 and Anemia of 
chronic disease D63.8

 

 [place] [id number] [place] [address] 19408-
6178    Arthritis M19.90 and Essential hypertension I10

 

 [place] [id number] [place] [address] 03079-
3376  15 May, 2018   

 

 [place] [id number] [place] [address] 48709-
9777  15 May, 2018   

 

 [facility]  [address] [address]-
9729  15 May, 2018  Medicare annual wellness visit, initial Z00.00 ; Type 2 
diabetes mellitus without complication, without long-term current use of 
insulin E11.9 ; Chronic kidney disease, stage 3 (moderate) N18.3 ; Essential 
hypertension I10 ; Gastroesophageal reflux disease, esophagitis presence not 
specified K21.9 ; Anxiety F41.9 ; Arthritis M19.90 and Encounter for 
immunization Z23

 

 [place] [id number] [place] [address] 49445-
1474  03 May, 2018  Essential hypertension I10

 

 [place] [address] [address]199-
7029    Arthritis M19.90

 

 [place] [address] 18314-
5934     

 

 [facility]  [id number] [place] [address] 55991-
1304     

 

 [place] [id number] [place] [address] 81051-
5307    Essential hypertension I10

 

 [facility]  [id number] [place] [address] [phone]
[phone]  27 Mar, 2018  Dysuria R30.0 and Vaginal candida B37.3

 

 [place] [id number] [place] [address] 68755-
6501  08 Mar, 2018  Arthritis M19.90

 

 [place] [id number] [place] [address] 83029-
0085     

 

 [place] [id number] [place] [address] 67622-
6554    Type 2 diabetes mellitus without complication, without long-
term current use of insulin E11.9 ; Lumbago with sciatica, left side M54.42 ; 
Arthritis M19.90 ; Iron deficiency anemia, unspecified iron deficiency anemia 
type D50.9 and BMI 40.0-44.9, adult Z68.41

 

 [facility]  [address] [address]-
3417     

 

 [facility]  [address] 9[address]-
2782     

 

 [facility]  [address] 14873-
8621    Arthritis M19.90 and Anxiety F41.9

 

 [facility]  [address] [address]30-
8161     

 

 [place] [address] 14896-
3092  18 Dec, 2017  Anxiety F41.9

 

 [place] [address] 71763-
2063     

 

 [facility]  [address] 12365-
2263     

 

 [place] [address] 82663-
8706    Vaginal yeast infection B37.3

 

 [place] [id number] [place] [address] 78565-
9590    Anxiety F41.9

 

 [facility]  [address] 13224-
6967    Type 2 diabetes mellitus without complication, without long-
term current use of insulin E11.9

 

 [facility]  [id number] [place] [address] 41246-
7966  08 2017   

 

 [place] [address] 99762-
1294     

 

 [facility]  [id number] [place] [address] 97164-
6405  24 Oct, 2017  Arthritis M19.90

 

 [place] [address] [address]-
0024  16 Oct, 2017   

 

 [place] [id number] [place] [address] 10091-
6151  04 Oct, 2017   

 

 [place] [id number] [place] [address] 38176-
7499  05 Sep, 2017   

 

 [place] [id number] [place] [address] 36652-
7075  25 Aug, 2017  Venous insufficiency I87.2 and Venous stasis dermatitis of 
right lower extremity I87.2

 

 [place] [id number] [place] [address] 53685-
1143  21 Aug, 2017  Essential hypertension I10

 

 [place] [id number] [place] [address] 65754-
5586     

 

 [place] [id number] [place] [address] 01130-
0586    Type 2 diabetes mellitus without complication, without long-
term current use of insulin E11.9 and Essential hypertension I10

 

 [place] [id number] [place] [address] 32956-
2773    Essential hypertension I10 and Type 2 diabetes mellitus 
without complication, without long-term current use of insulin E11.9

 

 [place] [id number] [place] [address] 43331-
2405    Chronic kidney disease, stage 3 (moderate) N18.3 ; Anemia 
of chronic disease D63.8 and Type 2 diabetes mellitus without complication, 
without long-term current use of insulin E11.9

 

 [place] [address] [place] [address] 23226-
6282  14 2017  Type 2 diabetes mellitus without complication, without long-
term current use of insulin E11.9 ; Iron deficiency anemia secondary to 
inadequate dietary iron intake D50.8 ; Arthritis M19.90 and Lumbago with 
sciatica, left side M54.42

 

 [place] [id number] [place] [address] 82451-
2772    Arthritis M19.90 and Anxiety F41.9

 

 [facility]  [id number] [place] [address] 80536-
0593    Type 2 diabetes mellitus without complication, without long-
term current use of insulin E11.9 and Essential hypertension I10

 

 [facility]  [id number] [place] [address] 98475-
6967  22 May, 2017  Anxiety F41.9

 

 [facility]  [address] [address]607-
2000  18 May, 2017  Monilial rash B37.2

 

 [facility]  [address] 77955-
4211  16 May, 2017   

 

 [facility]  [id number] [place] [address] 14432-
5438  15 May, 2017   

 

 [facility]  [address] [address]002-
7436  11 May, 2017   

 

 [facility]  [id number] [place] [address] 31559-
8411  11 May, 2017  Gastroesophageal reflux disease, esophagitis presence not 
specified K21.9

 

 [facility]  [id number] [place] [address] 13710-
6067  09 May, 2017  Arthritis M19.90

 

 [facility]  [id number] [place] [address] 08935-
5502  09 May, 2017  Anxiety F41.9 ; Arthritis M19.90 and Essential hypertension 
I10

 

 [facility]  [id number] [place] [address] 63603-
1098  05 May, 2017  Essential hypertension I10 and Anxiety F41.9

 

 [facility]  [address] 58429-
3292     

 

 [facility]  [id number] [place] [address] 99279-
6156    Arthritis M19.90 and Anxiety F41.9

 

 [place] [address] [place] [address] 77325-
9907  11 2017  Type 2 diabetes mellitus without complication, without long-
term current use of insulin E11.9 ; Cellulitis of right lower extremity L03.115 
and Arthritis M19.90

 

 [place] [id number] [place] [address] 14843-
2211  28 Mar, 2017   

 

 [place] [address] 31675-
4595  20 Mar, 2017  Type 2 diabetes mellitus without complication, without long-
term current use of insulin E11.9 ; Bronchitis J40 and Arthritis M19.90

 

 [place] [address] 0[address]36-
0119  16 Mar, 2017   

 

 [place] [id number] [place] [address] 34101-
7021  24 2017  Anxiety F41.9

 

 [facility] WALK IN [place] [id number] [place] [address] 76962
-[phone]    Dysuria R30.0 ; Acute cystitis with hematuria N30.01 and 
Vaginal yeast infection B37.3

 

 [place] [id number] [place] [address] 29016-
1635    Arthritis M19.90

 

 [facility] WALK [facility] [place] [id number] [place] [address] 21838
-[phone]    Strep pharyngitis J02.0 and Sore throat J02.9

 

 [place] [id number] [place] [address] 73789-
5874     

 

 [place] [id number] [place] [address] 01616-
1805    Type 2 diabetes mellitus without complication, without long-
term current use of insulin E11.9

 

 [place] [id number] [place] [address] 19211-
0227  14 Dec, 2016   

 

 [place] [id number] [place] [address] 96132-
4401  13 Dec, 2016  Seasonal allergic rhinitis due to pollen J30.1

 

 [facility] WALK IN [facility]  [address] [address]
[phone]  09 Dec, 2016  Impacted cerumen of right ear H61.21 and Seasonal allergic 
rhinitis due to pollen J30.1

 

 [facility]  [address] 97099-
7953  09 Dec, 2016   

 

 [facility]  [address] 93592-
4117  05 Dec, 2016   

 

 [facility]  [address] [address]323-
6316    Type 2 diabetes mellitus without complication, without long-
term current use of insulin E11.9 ; Anxiety F41.9 ; Essential hypertension I10 
and Encounter for immunization Z23

 

 [facility]  [address] 90978-
8405    Essential hypertension I10

 

 [facility]  [address] 35233-
7662     

 

 [facility]  [address] 77063-
6449     

 

 [facility]  [id number] [place] [address] 41944-
7815    Essential hypertension I10

 

 [facility]  [id number] [place] [address] 85962-
1693     

 

 [facility]  [id number] [place] [address] 58368-
8582  23 Sep, 2016   

 

 [facility]  [address] 64102-
9775  01 Sep, 2016   

 

 [facility]  [id number] [place] [address] 22975-
9582  30 Aug, 2016   

 

 [facility]  [address] 55253-
4552  24 Aug, 2016   

 

 [facility]  [address] [facility] [id number] [address]-
7323  22 Aug, 2016   

 

 [facility]  [address] [facility] [id number] [address]-
2469  22 Aug, 2016  Type 2 diabetes mellitus without complication, without long-
term current use of insulin E11.9 ; Essential hypertension I10 ; Acute non-
recurrent maxillary sinusitis J01.00 ; Arthritis M19.90 ; Lumbago with sciatica
, left side M54.42 ; Other chronic pain G89.29 and Anxiety F41.9







IMMUNIZATIONS

No Known Immunizations



SOCIAL HISTORY

Never Assessed



REASON FOR VISIT

Tramadol-tylenol



PLAN OF CARE





VITAL SIGNS





MEDICATIONS







 Medication  Instructions  Dosage  Frequency  Start Date  End Date  Duration  
Status

 

 Tramadol-Acetaminophen 37.5-325 MG  Orally every 4 hrs  2 tablets as needed  
4h        28 days  Active







RESULTS

No Results



PROCEDURES

No Known procedures



INSTRUCTIONS





MEDICATIONS ADMINISTERED

No Known Medications



MEDICAL (GENERAL) HISTORY







 Type  Description  Date

 

 Medical History  type II diabetes   

 

 Medical History  hypertension   

 

 Medical History  Arthritis   

 

 Medical History  skin cancer-scalp   

 

 Surgical History  hysterectomy   

 

 Surgical History  skin cancer removal-scalp   

 

 Hospitalization History  Surgery(s) only   

 

 Hospitalization History  dehydration  2017

 

 Hospitalization History  bronchitis  2107

## 2019-02-08 NOTE — XMS REPORT
[facility]

 Created on: 2018



Richi Sandrita

External Reference #: [id number]

: 1934

Sex: Female



Demographics







 Address  [address] [place]  [address]

 

 Preferred Language  Unknown

 

 Marital Status  Unknown

 

 Sabianist Affiliation  Unknown

 

 Race  Unknown

 

 Ethnic Group  Unknown





Author







 Author  YULISA RANGEL

 

 [facility]

 

 Address  [address] [place]  [address]



 

 Phone  [phone]







Care Team Providers







 Care Team Member Name  Role  Phone

 

 YULISA RANGEL  Unavailable  [phone]







PROBLEMS







 Type  Condition  ICD9-CM Code  OCL23-BH Code  Onset Dates  Condition Status  
SNOMED Code

 

 Problem  Essential hypertension     I10     Active  90524116

 

 Problem  Type 2 diabetes mellitus without complication, without long-term 
current use of insulin     E11.9     Active  771476844

 

 Problem  Anxiety     F41.9     Active  83145690

 

 Problem  Lumbago with sciatica, left side     M54.42     Active  982230636

 

 Problem  Arthritis     M19.90     Active  6013568

 

 Problem  Iron deficiency anemia, unspecified iron deficiency anemia type     
D50.9     Active  32641494

 

 Problem  Venous insufficiency     I87.2     Active  03799364

 

 Problem  Gastroesophageal reflux disease, esophagitis presence not specified  
   K21.9     Active  065744314

 

 Problem  Seasonal allergic rhinitis due to pollen     J30.1     Active  
01867312

 

 Problem  Anemia of chronic disease     D63.8     Active  183938640

 

 Problem  Chronic kidney disease, stage 3 (moderate)     N18.3     Active  
[id number]







ALLERGIES

No Information



ENCOUNTERS







 Encounter  Location  Date  Diagnosis

 

 [place] [id number] [place] [address] 18743-
4244    Essential hypertension I10

 

 [place] [id number] [place] [address] 39100-
9079    Type 2 diabetes mellitus without complication, without long-
term current use of insulin E11.9 ; Essential hypertension I10 and Anemia of 
chronic disease D63.8

 

 [place] [id number] [place] [address] 09496-
5228    Arthritis M19.90 and Essential hypertension I10

 

 [place] [id number] [place] [address] 46247-
8712  15 May, 2018   

 

 [place] [id number] [place] [address] 52651-
4316  15 May, 2018   

 

 [facility]  [address] [address]-
9269  15 May, 2018  Medicare annual wellness visit, initial Z00.00 ; Type 2 
diabetes mellitus without complication, without long-term current use of 
insulin E11.9 ; Chronic kidney disease, stage 3 (moderate) N18.3 ; Essential 
hypertension I10 ; Gastroesophageal reflux disease, esophagitis presence not 
specified K21.9 ; Anxiety F41.9 ; Arthritis M19.90 and Encounter for 
immunization Z23

 

 [place] [id number] [place] [address] 53256-
9361  03 May, 2018  Essential hypertension I10

 

 [place] [address] 22897-
8433    Arthritis M19.90

 

 [place] [address] 12892-
9390     

 

 [facility]  [id number] [place] [address] 37210-
3000     

 

 [place] [id number] [place] [address] 73470-
8019    Essential hypertension I10

 

 [facility]  [id number] [place] [address] 82742
-[phone]  27 Mar, 2018  Dysuria R30.0 and Vaginal candida B37.3

 

 [place] [id number] [place] [address] 80889-
8211  08 Mar, 2018  Arthritis M19.90

 

 [place] [id number] [place] [address] 52781-
3363     

 

 [place] [id number] [place] [address] 56842-
1059    Type 2 diabetes mellitus without complication, without long-
term current use of insulin E11.9 ; Lumbago with sciatica, left side M54.42 ; 
Arthritis M19.90 ; Iron deficiency anemia, unspecified iron deficiency anemia 
type D50.9 and BMI 40.0-44.9, adult Z68.41

 

 [facility]  [address] 00359-
3318     

 

 [facility]  [address] 82272-
5232     

 

 [facility]  [address] 76285-
5877    Arthritis M19.90 and Anxiety F41.9

 

 [facility]  [address] 25032-
4970     

 

 [place] [address] 65132-
7480  18 Dec, 2017  Anxiety F41.9

 

 [place] [address] 50885-
5160     

 

 [facility]  [address] 83967-
1416     

 

 [place] [address] 75615-
6899    Vaginal yeast infection B37.3

 

 [place] [id number] [place] [address] 82989-
1754    Anxiety F41.9

 

 [facility]  [address] 37757-
4262    Type 2 diabetes mellitus without complication, without long-
term current use of insulin E11.9

 

 [facility]  [id number] [place] [address] 62628-
5590  08 2017   

 

 [place] [address] 71047-
6355     

 

 [facility]  [id number] [place] [address] 69007-
9338  24 Oct, 2017  Arthritis M19.90

 

 [place] [address] [address]-
3798  16 Oct, 2017   

 

 [place] [id number] [place] [address] 21460-
0509  04 Oct, 2017   

 

 [place] [id number] [place] [address] 11699-
1446  05 Sep, 2017   

 

 [place] [id number] [place] [address] 79880-
5179  25 Aug, 2017  Venous insufficiency I87.2 and Venous stasis dermatitis of 
right lower extremity I87.2

 

 [place] [id number] [place] [address] 18610-
2110  21 Aug, 2017  Essential hypertension I10

 

 [place] [id number] [place] [address] 29712-
1502     

 

 [place] [id number] [place] [address] 25874-
3907    Type 2 diabetes mellitus without complication, without long-
term current use of insulin E11.9 and Essential hypertension I10

 

 [place] [id number] [place] [address] 39997-
2255    Essential hypertension I10 and Type 2 diabetes mellitus 
without complication, without long-term current use of insulin E11.9

 

 [place] [id number] [place] [address] 76723-
9194    Chronic kidney disease, stage 3 (moderate) N18.3 ; Anemia 
of chronic disease D63.8 and Type 2 diabetes mellitus without complication, 
without long-term current use of insulin E11.9

 

 [place] [address] [place] [address] 56440-
1930  14 2017  Type 2 diabetes mellitus without complication, without long-
term current use of insulin E11.9 ; Iron deficiency anemia secondary to 
inadequate dietary iron intake D50.8 ; Arthritis M19.90 and Lumbago with 
sciatica, left side M54.42

 

 [place] [id number] [place] [address] 55794-
4706    Arthritis M19.90 and Anxiety F41.9

 

 [facility]  [id number] [place] [address] 81071-
2821    Type 2 diabetes mellitus without complication, without long-
term current use of insulin E11.9 and Essential hypertension I10

 

 [facility]  [id number] [place] [address] 26172-
4357  22 May, 2017  Anxiety F41.9

 

 [facility]  [address] 98656-
0300  18 May, 2017  Monilial rash B37.2

 

 [facility]  [address] [address]-
2918  16 May, 2017   

 

 [facility]  [id number] [place] [address] 03175-
0221  15 May, 2017   

 

 [facility]  [address] 54406-
4948  11 May, 2017   

 

 [facility]  [id number] [place] [address] 51450-
1316  11 May, 2017  Gastroesophageal reflux disease, esophagitis presence not 
specified K21.9

 

 [facility]  [id number] [place] [address] 47323-
2355  09 May, 2017  Arthritis M19.90

 

 [facility]  [id number] [place] [address] 10992-
5742  09 May, 2017  Anxiety F41.9 ; Arthritis M19.90 and Essential hypertension 
I10

 

 [facility]  [id number] [place] [address] 43769-
6848  05 May, 2017  Essential hypertension I10 and Anxiety F41.9

 

 [facility]  [address] 09685-
1489     

 

 [facility]  [id number] [place] [address] 33344-
1678    Arthritis M19.90 and Anxiety F41.9

 

 [place] [address] [place] [address] 56902-
1838  11 2017  Type 2 diabetes mellitus without complication, without long-
term current use of insulin E11.9 ; Cellulitis of right lower extremity L03.115 
and Arthritis M19.90

 

 [place] [id number] [place] [address] 53830-
3973  28 Mar, 2017   

 

 [place] [address] 37771-
7749  20 Mar, 2017  Type 2 diabetes mellitus without complication, without long-
term current use of insulin E11.9 ; Bronchitis J40 and Arthritis M19.90

 

 [place] [address] 26331-
0710  16 Mar, 2017   

 

 [place] [id number] [place] [address] 99023-
5697  24 2017  Anxiety F41.9

 

 [facility] WALK IN [place] [id number] [place] [address] 49239
-[phone]    Dysuria R30.0 ; Acute cystitis with hematuria N30.01 and 
Vaginal yeast infection B37.3

 

 [place] [id number] [place] [address] 54067-
7010    Arthritis M19.90

 

 [facility] WALK [facility] [place] [id number] [place] [address] 40435
-[phone]    Strep pharyngitis J02.0 and Sore throat J02.9

 

 [place] [id number] [place] [address] 37192-
3514     

 

 [place] [id number] [place] [address] 86844-
8547    Type 2 diabetes mellitus without complication, without long-
term current use of insulin E11.9

 

 [place] [id number] [place] [address] 21545-
7361  14 Dec, 2016   

 

 [place] [id number] [place] [address] 75167-
3976  13 Dec, 2016  Seasonal allergic rhinitis due to pollen J30.1

 

 [facility] WALK IN [facility]  [address] 66077
-9156  09 Dec, 2016  Impacted cerumen of right ear H61.21 and Seasonal allergic 
rhinitis due to pollen J30.1

 

 [facility]  [address] 31185-
4792  09 Dec, 2016   

 

 [facility]  [address] 86365-
0802  05 Dec, 2016   

 

 [facility]  [address] 23715-
8734    Type 2 diabetes mellitus without complication, without long-
term current use of insulin E11.9 ; Anxiety F41.9 ; Essential hypertension I10 
and Encounter for immunization Z23

 

 [facility]  [address] 13472-
5972    Essential hypertension I10

 

 [facility]  [address] 61075-
3814     

 

 [facility]  [address] 39654-
7757     

 

 [facility]  [id number] [place] [address] 05721-
3228    Essential hypertension I10

 

 [facility]  [id number] [place] [address] 53754-
7408     

 

 [facility]  [id number] [place] [address] 08256-
9695  23 Sep, 2016   

 

 [facility]  [address] 91824-
7878  01 Sep, 2016   

 

 [facility]  [id number] [place] [address] 34622-
9056  30 Aug, 2016   

 

 [facility]  [address] 92454-
8974  24 Aug, 2016   

 

 [facility]  [address] [facility] [id number] [place] [address]-
4012  22 Aug, 2016   

 

 [facility]  [address] [facility] [id number] [place] [address]-
6632  22 Aug, 2016  Type 2 diabetes mellitus without complication, without long-
term current use of insulin E11.9 ; Essential hypertension I10 ; Acute non-
recurrent maxillary sinusitis J01.00 ; Arthritis M19.90 ; Lumbago with sciatica
, left side M54.42 ; Other chronic pain G89.29 and Anxiety F41.9







IMMUNIZATIONS

No Known Immunizations



SOCIAL HISTORY

Never Assessed



REASON FOR VISIT

Requests return call



PLAN OF CARE





VITAL SIGNS





MEDICATIONS

Unknown Medications



RESULTS

No Results



PROCEDURES

No Known procedures



INSTRUCTIONS





MEDICATIONS ADMINISTERED

No Known Medications



MEDICAL (GENERAL) HISTORY







 Type  Description  Date

 

 Medical History  type II diabetes   

 

 Medical History  hypertension   

 

 Medical History  Arthritis   

 

 Medical History  skin cancer-scalp   

 

 Surgical History  hysterectomy   

 

 Surgical History  skin cancer removal-scalp   

 

 Hospitalization History  Surgery(s) only   

 

 Hospitalization History  dehydration  2017

 

 Hospitalization History  bronchitis  2107

## 2019-02-08 NOTE — XMS REPORT
[facility]

 Created on: 09/15/2017



Sandrita Stoddard

External Reference #: [id number]

: 1934

Sex: Female



Demographics







 Address  [address] [place]  [address]

 

 Preferred Language  Unknown

 

 Marital Status  Unknown

 

 Mandaen Affiliation  Unknown

 

 Race  Unknown

 

 Ethnic Group  Unknown





Author







 Author  STIVEN HECTOR

 

 Organization  [facility]

 

 Address  [address] [place]  [address]



 

 Phone  [phone]







Care Team Providers







 Care Team Member Name  Role  Phone

 

 STIVEN HECTOR  Unavailable  [phone]







PROBLEMS







 Type  Condition  ICD9-CM Code  GXK53-PR Code  Onset Dates  Condition Status  
SNOMED Code

 

 Problem  Arthritis     M19.90     Active  6015574

 

 Problem  Anxiety     F41.9     Active  65699343

 

 Problem  Essential hypertension     I10     Active  28472221

 

 Problem  Lumbago with sciatica, left side     M54.42     Active  557090290

 

 Problem  Venous insufficiency     I87.2     Active  08502254

 

 Problem  Anemia of chronic disease     D63.8     Active  051347174

 

 Problem  Seasonal allergic rhinitis due to pollen     J30.1     Active  
15007521

 

 Problem  Type 2 diabetes mellitus without complication, without long-term 
current use of insulin     E11.9     Active  417075028

 

 Problem  Chronic kidney disease, stage 3 (moderate)     N18.3     Active  
766971783

 

 Problem  Gastroesophageal reflux disease, esophagitis presence not specified  
   K21.9     Active  970919922







ALLERGIES







 Substance  Reaction  Event Type  Date  Status

 

 N.K.D.A.  Unknown  Non Drug Allergy    Unknown







SOCIAL HISTORY

No smoking Hx information available



PLAN OF CARE







 Activity  Details









  









 Follow Up  prn Reason:







VITAL SIGNS







 Height  56 in  2017

 

 Weight  179 lbs  2017

 

 Temperature  97.2 degrees Fahrenheit  2017

 

 Heart Rate  76 bpm  2017

 

 Respiratory Rate  20   2017

 

 BMI  40.13 kg/m2  2017

 

 Blood pressure systolic  120 mmHg  2017

 

 Blood pressure diastolic  54 mmHg  2017







MEDICATIONS







 Medication  Instructions  Dosage  Frequency  Start Date  End Date  Duration  
Status

 

 Metformin HCl 500  Orally Twice a day  1 tablet with meals  12h        30  
Active

 

 Tramadol-Acetaminophen 37.5-325 MG  Orally every 4 hrs  2 tablets as needed  
4h           Active

 

 Metoprolol Tartrate 25 MG  Orally Twice a day  1 tablet with food  12h        
   Active

 

 Diflucan 150 MG  Orally Take one tablet today then repeat in 72 hours  1 
tablet       4 days  Active

 

 Glimepiride 4 MG  Orally Once a day  1 tablet with breakfast or the first main 
meal of the day  24h           Active

 

 ProAir  (90 Base) MCG/ACT  Inhalation every 4 hrs  2 puffs as needed  
4h           Active

 

 Aspir-81 81 MG  Orally Once a day  1 tablet  24h           Active

 

 Macrobid 100 MG  Orally every 12 hrs  1 capsule with food  12h    
3 2017  7 day(s)  Active

 

 Flonase Allergy Relief 50 MCG/ACT  Nasally Once a day  1 spray in each nostril
  24h  13 Dec, 2016     30 day(s)  Active

 

 Xanax 0.5     TAKE ONE TABLET BY MOUTH THREE TIMES A DAY AS NEEDED FOR ANXIETY
           30  Active

 

 Acetaminophen 500 MG  Orally every 6 hrs  2 capsules as needed  6h           
Active

 

 Lisinopril 5 mg  Orally Once a day  1 tablet  24h           Active

 

 Metformin HCl 500 MG  Orally Twice a day  1 tablet with meals  12h           
Active

 

 Timolol 0.5 %  Ophthalmic Twice a day  1 drop into affected eye  12h           
Active

 

 Alprazolam 0.5 MG  Orally Three times a day  1 tablet  8h           Active

 

 Hydrochlorothiazide 25 MG  Orally Once a day  1 tablet  24h           Active







RESULTS







 Name  Result  Date  Reference Range

 

 UA LONG DIP (IN HOUSE)     2017   

 

 Lot #  [id number]      

 

 Exp date  2018      

 

 Clarity  clear      

 

 Color  yellow      

 

 Odor  none      

 

 GLU  negative      

 

 PACO  negative      

 

 KET  negative      

 

 SG  1.025      

 

 BLO  trace-intact      

 

 pH  6.0      

 

 Protein  1+      

 

 URO  0.2      

 

 NIT  negative      

 

 RUSSELL  1+      

 

 Lot #  [id number]      

 

 Exp date  2018      

 

 CULTURE, URINE     2017   

 

 Urine Culture, Routine  Final report      

 

 Result 1  No growth      







PROCEDURES







 Procedure  Date Ordered  Related Diagnosis  Body Site

 

 URINALYSIS, AUTO, W/O SCOPE  2017      

 

 [facility]  [facility]      

 

 Office Visit, Est Pt., Level 3  2017      

 

 [facility] VISIT ESTABLISHED PATIENT  2017      







IMMUNIZATIONS

No Known Immunizations

## 2019-02-08 NOTE — XMS REPORT
[facility]

 Created on: 2016



StoddardVladimirna

External Reference #: [id number]

: 1934

Sex: Female



Demographics







 Address  [address] [place]  [address]

 

 Home Phone  [phone]

 

 Preferred Language  Unknown

 

 Marital Status  Unknown

 

 Caodaism Affiliation  Unknown

 

 Race   or 

 

 Ethnic Group  Not  or 





Author







 Author  YULISA RANGEL

 

 Organization  [employer]

 

 Address  Unknown

 

 Phone  Unavailable







Care Team Providers







 Care Team Member Name  Role  Phone

 

 YULISA RANGEL  CP  Unavailable



                                                                



Allergies

          No Known Allergies                                                   
                                     



Problems

          





 Problem Type  Condition  Code  Onset Dates  Condition Status

 

 Problem  Essential hypertension  I10     Active

 

 Problem  Arthritis  M19.90     Active

 

 Problem  Type 2 diabetes mellitus without complication, without long-term 
current use of insulin  E11.9     Active

 

 Problem  Lumbago with sciatica, left side  M54.42     Active

 

 Problem  Anxiety  F41.9     Active



                                                                               
                                                 



Medications

          





 Medication  Code System  Code  Instructions  Start Date  End Date  Status  
Dosage

 

 Tramadol-Acetaminophen  NDC  [address]-92  37.5-325 MG Orally every 4 hrs    
       2 tablets as needed



                                                                              



Results

          No Known Results                                                     
               



Summary Purpose

          [employer] Submission

## 2019-02-08 NOTE — XMS REPORT
[facility]

 Created on: 2017



Sandrita Stoddard

External Reference #: [id number]

: 1934

Sex: Female



Demographics







 Address  [address] [place]  [address]

 

 Preferred Language  Unknown

 

 Marital Status  Unknown

 

 Confucianist Affiliation  Unknown

 

 Race  Unknown

 

 Ethnic Group  Unknown





Author







 Author  STIVEN HECTOR

 

 Organization  [facility]

 

 Address  [address] [place]  [address]



 

 Phone  [phone]







Care Team Providers







 Care Team Member Name  Role  Phone

 

 STIVEN HECTOR  Unavailable  [phone]







PROBLEMS







 Type  Condition  ICD9-CM Code  WIN64-VC Code  Onset Dates  Condition Status  
SNOMED Code

 

 Problem  Arthritis     M19.90     Active  1360103

 

 Problem  Anxiety     F41.9     Active  43034936

 

 Problem  Essential hypertension     I10     Active  42149789

 

 Problem  Lumbago with sciatica, left side     M54.42     Active  444321194

 

 Problem  Venous insufficiency     I87.2     Active  27879549

 

 Problem  Anemia of chronic disease     D63.8     Active  665108587

 

 Problem  Seasonal allergic rhinitis due to pollen     J30.1     Active  
18372459

 

 Problem  Type 2 diabetes mellitus without complication, without long-term 
current use of insulin     E11.9     Active  794059230

 

 Problem  Chronic kidney disease, stage 3 (moderate)     N18.3     Active  
266005282

 

 Problem  Gastroesophageal reflux disease, esophagitis presence not specified  
   K21.9     Active  207576800







ALLERGIES







 Substance  Reaction  Event Type  Date  Status

 

 N.K.D.A.  Unknown  Non Drug Allergy    Unknown







SOCIAL HISTORY

No smoking Hx information available



PLAN OF CARE







 Activity  Details









  









 Follow Up  prn Reason:







VITAL SIGNS







 Height  56 in  2017

 

 Weight  178.6 lbs  2017

 

 Temperature  97.2 degrees Fahrenheit  2017

 

 Heart Rate  72 bpm  2017

 

 Respiratory Rate  20   2017

 

 BMI  40.04 kg/m2  2017

 

 Blood pressure systolic  132 mmHg  2017

 

 Blood pressure diastolic  58 mmHg  2017







MEDICATIONS







 Medication  Instructions  Dosage  Frequency  Start Date  End Date  Duration  
Status

 

 Amoxicillin 500 MG  Orally every 12 hrs  1 capsule  12h    10 day(s)  Active

 

 Tramadol-Acetaminophen 37.5-325 MG  Orally every 4 hrs  2 tablets as needed  
4h           Active

 

 Timolol 0.5 %  Ophthalmic Twice a day  1 drop into affected eye  12h           
Active

 

 ProAir  (90 Base) MCG/ACT  Inhalation every 4 hrs  2 puffs as needed  
4h           Active

 

 Acetaminophen 500 MG  Orally every 6 hrs  2 capsules as needed  6h           
Active

 

 Metoprolol Tartrate 25 MG  Orally Twice a day  1 tablet with food  12h        
   Active

 

 Lisinopril 5 mg  Orally Once a day  1 tablet  24h           Active

 

 Alprazolam 0.5 MG  Orally Three times a day  1 tablet  8h           Active

 

 Xanax 0.5     TAKE ONE TABLET BY MOUTH THREE TIMES A DAY AS NEEDED FOR ANXIETY
           30  Active

 

 Metformin HCl 500 MG  Orally Twice a day  1 tablet with meals  12h           
Active

 

 Flonase Allergy Relief 50 MCG/ACT  Nasally Once a day  1 spray in each nostril
  24h  13 Dec, 2016     30 day(s)  Active

 

 Hydrochlorothiazide 25 MG  Orally Once a day  1 tablet  24h           Active

 

 Aspir-81 81 MG  Orally Once a day  1 tablet  24h           Active

 

 Glimepiride 4 MG  Orally Once a day  1 tablet with breakfast or the first main 
meal of the day  24h           Active

 

 Metformin HCl 500  Orally Twice a day  1 tablet with meals  12h        30  
Active







RESULTS







 Name  Result  Date  Reference Range

 

 STREP A (IN HOUSE)     2017   

 

 STREP A  positive      

 

 Control  +      

 

 Lot #  [id number]      

 

 Exp date        







PROCEDURES







 Procedure  Date Ordered  Related Diagnosis  Body Site

 

 STREP A ASSAY W/OPTIC  2017      

 

 [facility] VISIT ESTABLISHED PATIENT  2017      

 

 Office Visit, Est Pt., Level 3  2017      







IMMUNIZATIONS

No Known Immunizations

## 2019-02-08 NOTE — XMS REPORT
[facility]

 Created on: 2018



Richi Sandrita

External Reference #: [id number]

: 1934

Sex: Female



Demographics







 Address  [address] [place]  [address]

 

 Preferred Language  Unknown

 

 Marital Status  Unknown

 

 Nondenominational Affiliation  Unknown

 

 Race  Unknown

 

 Ethnic Group  Unknown





Author







 Author  YULISA RANGEL

 

 [facility]

 

 Address  [address] [place]  [address]



 

 Phone  [phone]







Care Team Providers







 Care Team Member Name  Role  Phone

 

 YULISA RANGEL  Unavailable  [phone]







PROBLEMS







 Type  Condition  ICD9-CM Code  IWU13-DK Code  Onset Dates  Condition Status  
SNOMED Code

 

 Problem  Essential hypertension     I10     Active  57540664

 

 Problem  Type 2 diabetes mellitus without complication, without long-term 
current use of insulin     E11.9     Active  445224437

 

 Problem  Anxiety     F41.9     Active  93543215

 

 Problem  Lumbago with sciatica, left side     M54.42     Active  527852131

 

 Problem  Arthritis     M19.90     Active  5600964

 

 Problem  Iron deficiency anemia, unspecified iron deficiency anemia type     
D50.9     Active  47184849

 

 Problem  Venous insufficiency     I87.2     Active  10202794

 

 Problem  Gastroesophageal reflux disease, esophagitis presence not specified  
   K21.9     Active  273189511

 

 Problem  Seasonal allergic rhinitis due to pollen     J30.1     Active  
25545337

 

 Problem  Anemia of chronic disease     D63.8     Active  629776905

 

 Problem  Chronic kidney disease, stage 3 (moderate)     N18.3     Active  
530762748







ALLERGIES

No Information



ENCOUNTERS







 Encounter  Location  Date  Diagnosis

 

 [place] [address] [place] [address] [phone]
[phone]    Type 2 diabetes mellitus without complication, without long-
term current use of insulin E11.9 ; Essential hypertension I10 and Anemia of 
chronic disease D63.8

 

 [facility]  [address] [address]
1002    Arthritis M19.90 and Essential hypertension I10

 

 [facility]  [address] [place] [address] [phone]
[phone]  15 May, 2018   

 

 [facility]  [address] [place] [address] [phone]
[phone]  15 May, 2018   

 

 [place] [address] [place] [id number] [place] [address] [phone]
[phone]  15 May, 2018  Medicare annual wellness visit, initial Z00.00 ; Type 2 
diabetes mellitus without complication, without long-term current use of 
insulin E11.9 ; Chronic kidney disease, stage 3 (moderate) N18.3 ; Essential 
hypertension I10 ; Gastroesophageal reflux disease, esophagitis presence not 
specified K21.9 ; Anxiety F41.9 ; Arthritis M19.90 and Encounter for 
immunization Z23

 

 [place] [address] [place] [id number] [place] [address] [phone]
[phone]  03 May, 2018  Essential hypertension I10

 

 [place] [address] [place] [id number] [place] [address] [phone]-
[phone]    Arthritis M19.90

 

 [place] [address] [place] [id number] [place] [address] [phone]-
[phone]     

 

 [place] [address] [place] [id number] [place] [address] [phone]
[phone]     

 

 [place] [address] [place] [id number] [place] [address] [phone]-
[phone]    Essential hypertension I10

 

 [facility]ALK [facility]  [address] [place] [id number] [place] [address] [phone]
[phone]  27 Mar, 2018  Dysuria R30.0 and Vaginal candida B37.3

 

 [place] [id number] [place] [address] [phone]
[phone]  08 Mar, 2018  Arthritis M19.90

 

 [place] [address] [place] [id number] [place] [address] [phone]
[phone]     

 

 [place] [address] [place] [id number] [place] [address] [phone]
[phone]    Type 2 diabetes mellitus without complication, without long-
term current use of insulin E11.9 ; Lumbago with sciatica, left side M54.42 ; 
Arthritis M19.90 ; Iron deficiency anemia, unspecified iron deficiency anemia 
type D50.9 and BMI 40.0-44.9, adult Z68.41

 

 [id number] [place] [address] [phone]
[phone]     

 

 [facility]  [address] [place] [id number] [place] [address] [phone]
[phone]     

 

 [facility]  [address] [place] [id number] [place] [address] [phone]-
[phone]    Arthritis M19.90 and Anxiety F41.9

 

 [facility]  [address] [phone]-
[phone]     

 

 [facility]  [address] [place] [id number] [place] [address] [phone]
[phone]  18 Dec, 2017  Anxiety F41.9

 

 [facility]  [address] [place] [id number] [place] [address] [phone]
[phone]     

 

 [facility]  [address] [place] [id number] [place] [address] [phone]-
[phone]     

 

 [facility]  [address] [place] [id number] [place] [address] [phone]
[phone]    Vaginal yeast infection B37.3

 

 [facility]  [address] [place] [id number] [place] [address] [phone]
[phone]    Anxiety F41.9

 

 [facility]  [address] [place] [id number] [place] [address] [phone]-
[phone]    Type 2 diabetes mellitus without complication, without long-
term current use of insulin E11.9

 

 [facility]  [address] [place] [id number] [place] [address] [phone]
[phone]  08 2017   

 

 [facility]  [address] [place] [id number] [place] [address] [phone]
[phone]     

 

 [facility]  [address] [place] [id number] [place] [address] [phone]
[phone]  24 Oct, 2017  Arthritis M19.90

 

 [facility]  [address] [place] [id number] [place] [address] [phone]
[phone]  16 Oct, 2017   

 

 [facility]  [address] [place] [id number] [place] [address] [phone]
[phone]  04 Oct, 2017   

 

 [place] [address] [place] [id number] [place] [address] [phone]
[phone]  05 Sep, 2017   

 

 [place] [address] [place] [id number] [place] [address] [phone]
[phone]  25 Aug, 2017  Venous insufficiency I87.2 and Venous stasis dermatitis of 
right lower extremity I87.2

 

 [place] [address] [place] [id number] [place] [address] [phone]
[phone]  21 Aug, 2017  Essential hypertension I10

 

 [address] [phone]
[phone]     

 

 [place] [address] [phone]
[phone]    Type 2 diabetes mellitus without complication, without long-
term current use of insulin E11.9 and Essential hypertension I10

 

 [place] [address] [place] [id number] [place] [address] [phone]-
[phone]    Essential hypertension I10 and Type 2 diabetes mellitus 
without complication, without long-term current use of insulin E11.9

 

 [place] [id number] [place] [address] 54165-
6874    Chronic kidney disease, stage 3 (moderate) N18.3 ; Anemia 
of chronic disease D63.8 and Type 2 diabetes mellitus without complication, 
without long-term current use of insulin E11.9

 

 [place] [address] [place] [id number] [place] [address] 03732-
[phone]    Type 2 diabetes mellitus without complication, without long-
term current use of insulin E11.9 ; Iron deficiency anemia secondary to 
inadequate dietary iron intake D50.8 ; Arthritis M19.90 and Lumbago with 
sciatica, left side M54.42

 

 [place] [id number] [place] [address] 30769-
2730    Arthritis M19.90 and Anxiety F41.9

 

 [address] [place] [address]-
7217    Type 2 diabetes mellitus without complication, without long-
term current use of insulin E11.9 and Essential hypertension I10

 

 [place] [address] [place] [id number] [place] [address] 75283-
[phone]  22 May, 2017  Anxiety F41.9

 

 [facility]  [address] [place] [id number] [place] [address] 41643-
[phone]  18 May, 2017  Monilial rash B37.2

 

 [facility]  [address] [phone]
[phone]  16 May, 2017   

 

 [place] [address] [phone]-
[phone]  15 May, 2017   

 

 [place] [address] [phone]-
[phone]  11 May, 2017   

 

 [place] [address] [phone]-
[phone]  11 May, 2017  Gastroesophageal reflux disease, esophagitis presence not 
specified K21.9

 

 [place] [address] [place] [id number] [place] [address] 50856-
7174  09 May, 2017  Arthritis M19.90

 

 [place] [address] [place] [id number] [place] [address] [phone]-
[phone]  09 May, 2017  Anxiety F41.9 ; Arthritis M19.90 and Essential hypertension 
I10

 

 [place] [address] [place] [id number] [place] [address] 35053-
[phone]  05 May, 2017  Essential hypertension I10 and Anxiety F41.9

 

 [place] [address] [place] [id number] [place] [address] [phone]-
[phone]     

 

 [place] [address] [place] [id number] [place] [address] 46021-
[phone]    Arthritis M19.90 and Anxiety F41.9

 

 [place] [address] [place] [id number] [place] [address] 28984-
4520    Type 2 diabetes mellitus without complication, without long-
term current use of insulin E11.9 ; Cellulitis of right lower extremity L03.115 
and Arthritis M19.90

 

 [place] [address] [place] [id number] [place] [address] [phone]-
4053  28 Mar, 2017   

 

 [place] [address] [place] [id number] [place] [address] 35906-
6014  20 Mar, 2017  Type 2 diabetes mellitus without complication, without long-
term current use of insulin E11.9 ; Bronchitis J40 and Arthritis M19.90

 

 [place] [address] [place] [id number] [place] [address] 21531-
[phone]  16 Mar, 2017   

 

 [place] [address] [phone]-
[phone]  24 2017  Anxiety F41.9

 

 [facility] WALK IN [place] [address] [place] [id number] [place] [address] [phone]
[phone]    Dysuria R30.0 ; Acute cystitis with hematuria N30.01 and 
Vaginal yeast infection B37.3

 

 [place] [address] [place] [id number] [place] [address] 36543-
[phone]    Arthritis M19.90

 

 [facility] WALK IN [place] [address] [place] [id number] [place] [address] [phone]
[phone]    Strep pharyngitis J02.0 and Sore throat J02.9

 

 [place] [address] [place] [address] [phone]-
[phone]     

 

 [place] [address] [place] [id number] [place] [address] [phone]-
[phone]    Type 2 diabetes mellitus without complication, without long-
term current use of insulin E11.9

 

 [place] [address] [place] [id number] [place] [address] 32447-
[phone]  14 Dec, 2016   

 

 [place] [address] [place] [id number] [place] [address] [phone]-
[phone]  13 Dec, 2016  Seasonal allergic rhinitis due to pollen J30.1

 

 [facility]  [address] [place] [id number] [place] [address] [phone]
[phone]  09 Dec, 2016  Impacted cerumen of right ear H61.21 and Seasonal allergic 
rhinitis due to pollen J30.1

 

 [facility]  [address] [place] [id number] [place] [address] [phone]-
[phone]  09 Dec, 2016   

 

 [facility]  [address] [place] [id number] [place] [address] 36581-
[phone]  05 Dec, 2016   

 

 [facility]  [address] [place] [id number] [place] [address] [phone]-
[phone]    Type 2 diabetes mellitus without complication, without long-
term current use of insulin E11.9 ; Anxiety F41.9 ; Essential hypertension I10 
and Encounter for immunization Z23

 

 [facility]  [address] [place] [id number] [place] [address] [phone]-
[phone]    Essential hypertension I10

 

 [place] [address] [phone]-
[phone]     

 

 [facility]  [address] [place] [id number] [place] [address] [phone]-
[phone]     

 

 [facility]  [address] [place] [id number] [place] [address] 13518-
[phone]    Essential hypertension I10

 

 [facility]  [address] [place] [id number] [place] [address] [phone]-
[phone]     

 

 [facility]  [address] [place] [id number] [place] [address] [phone]
[phone]  23 Sep, 2016   

 

 [facility]  [address] [place] [id number] [place] [address] [phone]
[phone]  01 Sep, 2016   

 

 [facility]  [address] [phone]
[phone]  30 Aug, 2016   

 

 [facility]  [address] [place] [id number] [place] [address] 37705-
[phone]  24 Aug, 2016   

 

 [facility]  [address] [place] [id number] [place] [address] [phone]-
[phone]  22 Aug, 2016   

 

 [facility]  [address] [facility] [id number] [place] [address]-
3602  22 Aug, 2016  Type 2 diabetes mellitus without complication, without long-
term current use of insulin E11.9 ; Essential hypertension I10 ; Acute non-
recurrent maxillary sinusitis J01.00 ; Arthritis M19.90 ; Lumbago with sciatica
, left side M54.42 ; Other chronic pain G89.29 and Anxiety F41.9







IMMUNIZATIONS

No Known Immunizations



SOCIAL HISTORY

Never Assessed



REASON FOR VISIT

Medication question



PLAN OF CARE





VITAL SIGNS





MEDICATIONS

Unknown Medications



RESULTS

No Results



PROCEDURES

No Known procedures



INSTRUCTIONS





MEDICATIONS ADMINISTERED

No Known Medications



MEDICAL (GENERAL) HISTORY







 Type  Description  Date

 

 Medical History  type II diabetes   

 

 Medical History  hypertension   

 

 Medical History  Arthritis   

 

 Medical History  skin cancer-scalp   

 

 Surgical History  hysterectomy   

 

 Surgical History  skin cancer removal-scalp   

 

 Hospitalization History  Surgery(s) only   

 

 Hospitalization History  dehydration  2017

 

 Hospitalization History  bronchitis  2107

## 2019-02-08 NOTE — XMS REPORT
[facility]

 Created on: 2018



Richi Sandrita

External Reference #: [id number]

: 1934

Sex: Female



Demographics







 Address  [address] [place]  [address]

 

 Preferred Language  Unknown

 

 Marital Status  Unknown

 

 Adventist Affiliation  Unknown

 

 Race  Unknown

 

 Ethnic Group  Unknown





Author







 Author  YULISA RANGEL

 

 [facility]

 

 Address  [address] [place]  [address]



 

 Phone  [phone]







Care Team Providers







 Care Team Member Name  Role  Phone

 

 YULISA RANGEL  Unavailable  [phone]







PROBLEMS







 Type  Condition  ICD9-CM Code  FCM80-MQ Code  Onset Dates  Condition Status  
SNOMED Code

 

 Problem  Essential hypertension     I10     Active  08321802

 

 Problem  Type 2 diabetes mellitus without complication, without long-term 
current use of insulin     E11.9     Active  489101707

 

 Problem  Anxiety     F41.9     Active  94458217

 

 Problem  Lumbago with sciatica, left side     M54.42     Active  116766674

 

 Problem  Arthritis     M19.90     Active  1557362

 

 Problem  Iron deficiency anemia, unspecified iron deficiency anemia type     
D50.9     Active  97388317

 

 Problem  Venous insufficiency     I87.2     Active  82473444

 

 Problem  Gastroesophageal reflux disease, esophagitis presence not specified  
   K21.9     Active  679279514

 

 Problem  Seasonal allergic rhinitis due to pollen     J30.1     Active  
04196962

 

 Problem  Anemia of chronic disease     D63.8     Active  785165443

 

 Problem  Chronic kidney disease, stage 3 (moderate)     N18.3     Active  
[id number]







ALLERGIES

No Information



ENCOUNTERS







 Encounter  Location  Date  Diagnosis

 

 [facility]  [id number] [phone]  27 Mar, 2018  Dysuria R30.0 and Vaginal candida B37.3

 

 [facility]  [address] [place] [address]-
2985  08 Mar, 2018  Arthritis M19.90

 

 [facility]  [id number] [place] [address]18-
8575     

 

 [facility]  [id number] [place] [address]113-
6024    Type 2 diabetes mellitus without complication, without long-
term current use of insulin E11.9 ; Lumbago with sciatica, left side M54.42 ; 
Arthritis M19.90 ; Iron deficiency anemia, unspecified iron deficiency anemia 
type D50.9 and BMI 40.0-44.9, adult Z68.41

 

 [place] [address] [address]-
2595     

 

 [place] [id number] [place] [address]99-
3845     

 

 [place] [address] [address]-
8076    Arthritis M19.90 and Anxiety F41.9

 

 [place] [address] [address]-
4998     

 

 [place] [address] [address]-
9324  18 Dec, 2017  Anxiety F41.9

 

 [place] [address] [address]-
1520     

 

 [place] [address] [address]-
8645     

 

 [place] [address] [address]-
0808    Vaginal yeast infection B37.3

 

 [place] [id number] [place] [address]506-
3731    Anxiety F41.9

 

 [place] [id number] [place] [address] 83157-
8371    Type 2 diabetes mellitus without complication, without long-
term current use of insulin E11.9

 

 [place] [address] [address]-
8125     

 

 [place] [id number] [place] [address] 99958-
5257     

 

 [place] [id number] [place] [address]122-
6709  24 Oct, 2017  Arthritis M19.90

 

 [place] [address] [address]-
9725  16 Oct, 2017   

 

 [place] [id number] [place] [address]654-
0909  04 Oct, 2017   

 

 [place] [id number] [place] [address] 22614-
8249  05 Sep, 2017   

 

 [place] [id number] [place] [address] 54692-
3973  25 Aug, 2017  Venous insufficiency I87.2 and Venous stasis dermatitis of 
right lower extremity I87.2

 

 [place] [id number] [place] [address] 48743-
6336  21 Aug, 2017  Essential hypertension I10

 

 [place] [id number] [place] [address] 82579-
7334     

 

 [place] [id number] [place] [address]007-
0909    Type 2 diabetes mellitus without complication, without long-
term current use of insulin E11.9 and Essential hypertension I10

 

 [place] [id number] [place] [address] 60760-
4420    Essential hypertension I10 and Type 2 diabetes mellitus 
without complication, without long-term current use of insulin E11.9

 

 [place] [id number] [place] [address] 74138-
0796    Chronic kidney disease, stage 3 (moderate) N18.3 ; Anemia 
of chronic disease D63.8 and Type 2 diabetes mellitus without complication, 
without long-term current use of insulin E11.9

 

 [place] [id number] [place] [address] 84285-
8437    Type 2 diabetes mellitus without complication, without long-
term current use of insulin E11.9 ; Iron deficiency anemia secondary to 
inadequate dietary iron intake D50.8 ; Arthritis M19.90 and Lumbago with 
sciatica, left side M54.42

 

 [place] [id number] [place] [address] 25538-
5499    Arthritis M19.90 and Anxiety F41.9

 

 [facility]  [address] [address]-
7987    Type 2 diabetes mellitus without complication, without long-
term current use of insulin E11.9 and Essential hypertension I10

 

 [facility]  [address] [address]-
5226  22 May, 2017  Anxiety F41.9

 

 [facility]  [address] [address]-
5415  18 May, 2017  Monilial rash B37.2

 

 [facility]  [address] [address]-
0163  16 May, 2017   

 

 [facility]  [address] [address]-
5954  15 May, 2017   

 

 [facility]  [address] [address]-
5618  11 May, 2017   

 

 [facility]  [id number] [place] [address] 35682-
2529  11 May, 2017  Gastroesophageal reflux disease, esophagitis presence not 
specified K21.9

 

 [facility]  [id number] [place] [address] 97275-
1357  09 May, 2017  Arthritis M19.90

 

 [facility]  [id number] [place] [address] 88576-
7646  09 May, 2017  Anxiety F41.9 ; Arthritis M19.90 and Essential hypertension 
I10

 

 [facility]  [id number] [place] [address] 31673-
1120  05 May, 2017  Essential hypertension I10 and Anxiety F41.9

 

 [facility]  [id number] [place] [address] 65334-
4171     

 

 [facility]  [id number] [place] [address] 63066-
9497    Arthritis M19.90 and Anxiety F41.9

 

 [place] [address] [place] [address] 74039-
2369  11 2017  Type 2 diabetes mellitus without complication, without long-
term current use of insulin E11.9 ; Cellulitis of right lower extremity L03.115 
and Arthritis M19.90

 

 [place] [id number] [place] [address] 63878-
1572  28 Mar, 2017   

 

 [place] [address] [address]-
7094  20 Mar, 2017  Type 2 diabetes mellitus without complication, without long-
term current use of insulin E11.9 ; Bronchitis J40 and Arthritis M19.90

 

 [place] [id number] [place] [address] 93121-
9041  16 Mar, 2017   

 

 [place] [id number] [place] [address] 03510-
4486  24 2017  Anxiety F41.9

 

 [facility]  [id number] [place] [address]
[phone]    Dysuria R30.0 ; Acute cystitis with hematuria N30.01 and 
Vaginal yeast infection B37.3

 

 [place] [id number] [place] [address] 22491-
9922    Arthritis M19.90

 

 [facility]  [id number] [place] [address]
-[phone]    Strep pharyngitis J02.0 and Sore throat J02.9

 

 [place] [id number] [place] [address] 16224-
6353     

 

 [place] [id number] [place] [address] 67565-
0534    Type 2 diabetes mellitus without complication, without long-
term current use of insulin E11.9

 

 [place] [address] [place] [address] 44587-
7333  14 Dec, 2016   

 

 [place] [id number] [place] [address] 29291-
9186  13 Dec, 2016  Seasonal allergic rhinitis due to pollen J30.1

 

 [facility]  [address] [phone]  09 Dec, 2016  Impacted cerumen of right ear H61.21 and Seasonal allergic 
rhinitis due to pollen J30.1

 

 [facility]  [address] [address]-
3745  09 Dec, 2016   

 

 [facility]  [address] [address]-
6099  05 Dec, 2016   

 

 [facility]  [address] [address]-
8296    Type 2 diabetes mellitus without complication, without long-
term current use of insulin E11.9 ; Anxiety F41.9 ; Essential hypertension I10 
and Encounter for immunization Z23

 

 [facility]  [address] [address]-
8904    Essential hypertension I10

 

 [facility]  [id number] [place] [address] 67163-
1728     

 

 [place] [address] [address]-
8248     

 

 [facility]  [id number] [place] [address] 14362-
2383    Essential hypertension I10

 

 [facility]  [id number] [place] [address] 63106-
5196     

 

 [facility]  [id number] [place] [address] 10526-
8598  23 Sep, 2016   

 

 [facility]  [address] [address]-
5931  01 Sep, 2016   

 

 [facility]  [id number] [place] [address] 28205-
1249  30 Aug, 2016   

 

 [facility]  [address] [place] [address]-
5241  24 Aug, 2016   

 

 [facility]  [address] [facility] [id number] [place] [address]-
2800  22 Aug, 2016   

 

 [facility]  [address] [facility] [id number] [address]-
1170  22 Aug, 2016  Type 2 diabetes mellitus without complication, without long-
term current use of insulin E11.9 ; Essential hypertension I10 ; Acute non-
recurrent maxillary sinusitis J01.00 ; Arthritis M19.90 ; Lumbago with sciatica
, left side M54.42 ; Other chronic pain G89.29 and Anxiety F41.9







IMMUNIZATIONS

No Known Immunizations



SOCIAL HISTORY

Never Assessed



REASON FOR VISIT

discontinue medication



PLAN OF CARE





VITAL SIGNS





MEDICATIONS







 Medication  Instructions  Dosage  Frequency  Start Date  End Date  Duration  
Status

 

 Metoprolol Tartrate 25 MG  Orally Twice a day  1 tablet with food  12h        
   Active







RESULTS

No Results



PROCEDURES

No Known procedures



INSTRUCTIONS





MEDICATIONS ADMINISTERED

No Known Medications



MEDICAL (GENERAL) HISTORY







 Type  Description  Date

 

 Medical History  type II diabetes   

 

 Medical History  hypertension   

 

 Medical History  Arthritis   

 

 Medical History  skin cancer-scalp   

 

 Surgical History  hysterectomy   

 

 Surgical History  skin cancer removal-scalp   

 

 Hospitalization History  Surgery(s) only   

 

 Hospitalization History  dehydration  2017

 

 Hospitalization History  bronchitis  2107

## 2019-02-08 NOTE — XMS REPORT
[facility]

 Created on: 2018



Sandrita Stoddard

External Reference #: [id number]

: 1934

Sex: Female



Demographics







 Address  [address] [place]  [address]

 

 Preferred Language  Unknown

 

 Marital Status  Unknown

 

 Sikh Affiliation  Unknown

 

 Race  Unknown

 

 Ethnic Group  Unknown





Author







 Author  YULISA RANGEL

 

 [facility]

 

 Address  [address] [place]  [address]



 

 Phone  [phone]







Care Team Providers







 Care Team Member Name  Role  Phone

 

 YULISA RANGEL  Unavailable  [phone]







PROBLEMS







 Type  Condition  ICD9-CM Code  WKT42-XL Code  Onset Dates  Condition Status  
SNOMED Code

 

 Problem  Essential hypertension     I10     Active  00643694

 

 Problem  Type 2 diabetes mellitus without complication, without long-term 
current use of insulin     E11.9     Active  369448533

 

 Problem  Anxiety     F41.9     Active  99941194

 

 Problem  Lumbago with sciatica, left side     M54.42     Active  573012532

 

 Problem  Arthritis     M19.90     Active  0271940

 

 Problem  Iron deficiency anemia, unspecified iron deficiency anemia type     
D50.9     Active  02477845

 

 Problem  Venous insufficiency     I87.2     Active  57373860

 

 Problem  Gastroesophageal reflux disease, esophagitis presence not specified  
   K21.9     Active  896381761

 

 Problem  Seasonal allergic rhinitis due to pollen     J30.1     Active  
04967520

 

 Problem  Anemia of chronic disease     D63.8     Active  672206751

 

 Problem  Chronic kidney disease, stage 3 (moderate)     N18.3     Active  
028011678







ALLERGIES

No Information



ENCOUNTERS







 Encounter  Location  Date  Diagnosis

 

 [place] [id number] [place] [address] [phone]-
[phone]  06 Aug, 2018   

 

 [place] [id number] [place] [address] 93629-
[phone]    Essential hypertension I10

 

 [place] [id number] [place] [address] 37585-
9802    Type 2 diabetes mellitus without complication, without long-
term current use of insulin E11.9 ; Essential hypertension I10 and Anemia of 
chronic disease D63.8

 

 [place] [id number] [place] [address] 95020-
2597    Arthritis M19.90 and Essential hypertension I10

 

 [place] [id number] [place] [address] 49317-
8596  15 May, 2018   

 

 [facility]  [id number] [place] [address] 68116-
0699  15 May, 2018   

 

 [facility]  [id number] [place] [address] 98545-
6961  15 May, 2018  Medicare annual wellness visit, initial Z00.00 ; Type 2 
diabetes mellitus without complication, without long-term current use of 
insulin E11.9 ; Chronic kidney disease, stage 3 (moderate) N18.3 ; Essential 
hypertension I10 ; Gastroesophageal reflux disease, esophagitis presence not 
specified K21.9 ; Anxiety F41.9 ; Arthritis M19.90 and Encounter for 
immunization Z23

 

 [place] [id number] [place] [address] 59503-
0275  03 May, 2018  Essential hypertension I10

 

 [place] [id number] [place] [address] 95191-
1284    Arthritis M19.90

 

 [facility]  [id number] [place] [address] 65930-
8641     

 

 [facility]  [id number] [place] [address] 24113-
2939     

 

 [facility]  [id number] [place] [address] [phone]-
1244    Essential hypertension I10

 

 [facility]  [id number] [place] [address] 69304
[phone]  27 Mar, 2018  Dysuria R30.0 and Vaginal candida B37.3

 

 [facility]  [id number] [place] [address] 82051-
2803  08 Mar, 2018  Arthritis M19.90

 

 [facility]  [id number] [place] [address] 55478-
5456     

 

 [facility]  [id number] [place] [address] 32520-
7835    Type 2 diabetes mellitus without complication, without long-
term current use of insulin E11.9 ; Lumbago with sciatica, left side M54.42 ; 
Arthritis M19.90 ; Iron deficiency anemia, unspecified iron deficiency anemia 
type D50.9 and BMI 40.0-44.9, adult Z68.41

 

 [place] [id number] [place] [address] 46273-
7533     

 

 [place] [address] [address]09-
3102     

 

 [place] [address] [address]18-
7203    Arthritis M19.90 and Anxiety F41.9

 

 [place] [address] 00362-
2411     

 

 [place] [id number] [place] [address] 25925-
5443  18 Dec, 2017  Anxiety F41.9

 

 [place] [id number] [place] [address] 01694-
4124     

 

 [place] [id number] [place] [address] 88753-
1258     

 

 [place] [id number] [place] [address] 80404-
0636    Vaginal yeast infection B37.3

 

 [place] [id number] [place] [address] 84564-
3936    Anxiety F41.9

 

 [place] [id number] [place] [address] 78178-
0518    Type 2 diabetes mellitus without complication, without long-
term current use of insulin E11.9

 

 [place] [id number] [place] [address] 38423-
6697     

 

 [place] [id number] [place] [address] 76997-
0362     

 

 [place] [id number] [place] [address] 39268-
2776  24 Oct, 2017  Arthritis M19.90

 

 [place] [id number] [place] [address] 00215-
8058  16 Oct, 2017   

 

 [place] [id number] [place] [address] 59124-
7494  04 Oct, 2017   

 

 [place] [id number] [place] [address] 54117-
7879  05 Sep, 2017   

 

 [place] [id number] [place] [address] 52382-
1146  25 Aug, 2017  Venous insufficiency I87.2 and Venous stasis dermatitis of 
right lower extremity I87.2

 

 [place] [id number] [place] [address] 57333-
3363  21 Aug, 2017  Essential hypertension I10

 

 [place] [id number] [place] [address] 75453-
3090     

 

 [place] [id number] [place] [address] 95521-
7942    Type 2 diabetes mellitus without complication, without long-
term current use of insulin E11.9 and Essential hypertension I10

 

 [place] [address] [place] [address] 78541-
8543    Essential hypertension I10 and Type 2 diabetes mellitus 
without complication, without long-term current use of insulin E11.9

 

 [place] [id number] [place] [address] 52101-
7890    Chronic kidney disease, stage 3 (moderate) N18.3 ; Anemia 
of chronic disease D63.8 and Type 2 diabetes mellitus without complication, 
without long-term current use of insulin E11.9

 

 [place] [address] [place] [address] 94061-
2880    Type 2 diabetes mellitus without complication, without long-
term current use of insulin E11.9 ; Iron deficiency anemia secondary to 
inadequate dietary iron intake D50.8 ; Arthritis M19.90 and Lumbago with 
sciatica, left side M54.42

 

 [facility]  [id number] [place] [address] 24786-
2820    Arthritis M19.90 and Anxiety F41.9

 

 [facility]  [id number] [place] [address] 37835-
2777    Type 2 diabetes mellitus without complication, without long-
term current use of insulin E11.9 and Essential hypertension I10

 

 [facility]  [address] 55739-
7533  22 May, 2017  Anxiety F41.9

 

 [facility]  [address] 97967-
3614  18 May, 2017  Monsoledad rash B37.2

 

 [facility]  [address] 39580-
5257  16 May, 2017   

 

 [facility]  [address] 67844-
0780  15 May, 2017   

 

 [facility]  [address] 31383-
9399  11 May, 2017   

 

 [facility]  [address] 98913-
5362  11 May, 2017  Gastroesophageal reflux disease, esophagitis presence not 
specified K21.9

 

 [facility]  [id number] [place] [address] 46777-
8464  09 May, 2017  Arthritis M19.90

 

 [facility]  [address] 37332-
7154  09 May, 2017  Anxiety F41.9 ; Arthritis M19.90 and Essential hypertension 
I10

 

 [facility]  [address] 91220-
9637  05 May, 2017  Essential hypertension I10 and Anxiety F41.9

 

 [facility]  [id number] [place] [address] 68699-
3967     

 

 [facility]  [address] [place] [address]-
9624    Arthritis M19.90 and Anxiety F41.9

 

 [place] [address] 90512-
0265    Type 2 diabetes mellitus without complication, without long-
term current use of insulin E11.9 ; Cellulitis of right lower extremity L03.115 
and Arthritis M19.90

 

 [place] [address] 20054-
8869  28 Mar, 2017   

 

 [place] [address] 78605-
8651  20 Mar, 2017  Type 2 diabetes mellitus without complication, without long-
term current use of insulin E11.9 ; Bronchitis J40 and Arthritis M19.90

 

 [place] [address] 92708-
6080  16 Mar, 2017   

 

 [place] [address] 07463-
5632    Anxiety F41.9

 

 [facility]  [address] [address]884
-[phone]    Dysuria R30.0 ; Acute cystitis with hematuria N30.01 and 
Vaginal yeast infection B37.3

 

 [place] [id number] [place] [address] 35413-
2862    Arthritis M19.90

 

 [facility]  [id number] [place] [address] 21002
[phone]    Strep pharyngitis J02.0 and Sore throat J02.9

 

 [place] [address] 68947-
3462     

 

 [place] [address] 03876-
8225    Type 2 diabetes mellitus without complication, without long-
term current use of insulin E11.9

 

 [place] [address] 99575-
5951  14 Dec, 2016   

 

 [facility]  [address] 25160-
5910  13 Dec, 2016  Seasonal allergic rhinitis due to pollen J30.1

 

 [facility] [place] [facility]  [address] [address]
-[phone]  09 Dec, 2016  Impacted cerumen of right ear H61.21 and Seasonal allergic 
rhinitis due to pollen J30.1

 

 [facility]  [address] 71693-
6014  09 Dec, 2016   

 

 [facility]  [address] 61960-
1346  05 Dec, 2016   

 

 [facility]  [address] 36871-
4068    Type 2 diabetes mellitus without complication, without long-
term current use of insulin E11.9 ; Anxiety F41.9 ; Essential hypertension I10 
and Encounter for immunization Z23

 

 [facility]  [address] 94800-
3856    Essential hypertension I10

 

 [place] [address] 25304-
7926     

 

 [place] [address] 69059-
6867     

 

 [facility]  [address] 55227-
5415    Essential hypertension I10

 

 [facility]  [id number] [place] [address] 92365-
7818     

 

 [facility]  [address] 33999-
0618  23 Sep, 2016   

 

 [facility]  [address] 49719-
0743  01 Sep, 2016   

 

 [facility]  [address] 31223-
6105  30 Aug, 2016   

 

 [facility]  [address] [facility] [id number] [address]-
6681  24 Aug, 2016   

 

 [facility]  [address] [facility] [id number] [address]-
4292  22 Aug, 2016   

 

 [facility]  [address] [facility] [id number] [address]-
8311  22 Aug, 2016  Type 2 diabetes mellitus without complication, without long-
term current use of insulin E11.9 ; Essential hypertension I10 ; Acute non-
recurrent maxillary sinusitis J01.00 ; Arthritis M19.90 ; Lumbago with sciatica
, left side M54.42 ; Other chronic pain G89.29 and Anxiety F41.9







IMMUNIZATIONS

No Known Immunizations



SOCIAL HISTORY

Never Assessed



REASON FOR VISIT





PLAN OF CARE





VITAL SIGNS





MEDICATIONS







 Medication  Instructions  Dosage  Frequency  Start Date  End Date  Duration  
Status

 

 Diflucan 150 MG     1 tablet     15 May, 2018        Active







RESULTS

No Results



PROCEDURES

No Known procedures



INSTRUCTIONS





MEDICATIONS ADMINISTERED

No Known Medications



MEDICAL (GENERAL) HISTORY







 Type  Description  Date

 

 Medical History  type II diabetes   

 

 Medical History  hypertension   

 

 Medical History  Arthritis   

 

 Medical History  skin cancer-scalp   

 

 Surgical History  hysterectomy   

 

 Surgical History  skin cancer removal-scalp   

 

 Hospitalization History  Surgery(s) only   

 

 Hospitalization History  dehydration  2017

 

 Hospitalization History  bronchitis  2107

## 2019-02-08 NOTE — XMS REPORT
[facility]

 Created on: 2018



Richi Sandrita

External Reference #: [id number]

: 1934

Sex: Female



Demographics







 Address  [address] [place]  [address]

 

 Preferred Language  Unknown

 

 Marital Status  Unknown

 

 Anglican Affiliation  Unknown

 

 Race  Unknown

 

 Ethnic Group  Unknown





Author







 Author  YULISA RANGEL

 

 [facility]

 

 Address  [address] [place]  [address]



 

 Phone  [phone]







Care Team Providers







 Care Team Member Name  Role  Phone

 

 YULISA RANGEL  Unavailable  [phone]







PROBLEMS







 Type  Condition  ICD9-CM Code  ANQ93-FT Code  Onset Dates  Condition Status  
SNOMED Code

 

 Problem  Essential hypertension     I10     Active  85454736

 

 Problem  Type 2 diabetes mellitus without complication, without long-term 
current use of insulin     E11.9     Active  745083360

 

 Problem  Anxiety     F41.9     Active  09086535

 

 Problem  Lumbago with sciatica, left side     M54.42     Active  418917318

 

 Problem  Arthritis     M19.90     Active  9084574

 

 Problem  Iron deficiency anemia, unspecified iron deficiency anemia type     
D50.9     Active  36315763

 

 Problem  Venous insufficiency     I87.2     Active  09228716

 

 Problem  Gastroesophageal reflux disease, esophagitis presence not specified  
   K21.9     Active  703083479

 

 Problem  Seasonal allergic rhinitis due to pollen     J30.1     Active  
29875572

 

 Problem  Anemia of chronic disease     D63.8     Active  738971807

 

 Problem  Chronic kidney disease, stage 3 (moderate)     N18.3     Active  
321750965







ALLERGIES

No Information



ENCOUNTERS







 Encounter  Location  Date  Diagnosis

 

 [place] [address], [place] [address]
2297  08 Mar, 2018  Arthritis M19.90

 

 [address] [place] [address]-
4087     

 

 [address] [place] [address]-
9437    Type 2 diabetes mellitus without complication, without long-
term current use of insulin E11.9 ; Lumbago with sciatica, left side M54.42 ; 
Arthritis M19.90 ; Iron deficiency anemia, unspecified iron deficiency anemia 
type D50.9 and BMI 40.0-44.9, adult Z68.41

 

 [place] [id number] [place] [address]-
4172     

 

 [facility] [place], [facility]  [id number] [place] [address]604-
0463     

 

 [facility] [place]  [address] [place] [id number] [place] [address] 73188-
8356    Arthritis M19.90 and Anxiety F41.9

 

 [facility] [place]  [address] [address]-
[phone]     

 

 [facility] [place]  [address] [place] [address] [id number] [place] [address] 84295-
8406  18 Dec, 2017  Anxiety F41.9

 

 [facility] [place]  [address] [place] [id number] [place] [address] 66496-
[phone]     

 

 [facility] [place] operated [facility]  [id number] [place] [address] 07567-
4217     

 

 [facility] [place]  [address] [place] [id number] [place] [address] 07893-
8772    Vaginal yeast infection B37.3

 

 [facility] [place]  [address] [place] [id number] [place] [address] 13143-
8751    Anxiety F41.9

 

 [facility] [place]  [id number] [place] [address] 84009-
0241    Type 2 diabetes mellitus without complication, without long-
term current use of insulin E11.9

 

 [facility] [place]  [address] [place] [address] 43825-
8160  08 2017   

 

 [facility] [place]  [id number] [place] [address] 90982-
0714     

 

 [facility] [place]  [id number] [place] [address] 57529-
1271  24 Oct, 2017  Arthritis M19.90

 

 [facility] [place]  [id number] [place] [address] 93772-
[phone]  16 Oct, 2017   

 

 [facility] [place]  [id number] [place] [address] 55588-
9248  04 Oct, 2017   

 

 [place] [address] [place] [id number] [place] [address] 34197-
4010  05 Sep, 2017   

 

 [place] [address] [place] [id number] [place] [address] 32309-
1304  25 Aug, 2017  Venous insufficiency I87.2 and Venous stasis dermatitis of 
right lower extremity I87.2

 

 [place] [address] [place] [id number] [place] [address] 37860-
2309  21 Aug, 2017  Essential hypertension I10

 

 [address] [phone]-
[phone]     

 

 [place] [address] [address]-
6023    Type 2 diabetes mellitus without complication, without long-
term current use of insulin E11.9 and Essential hypertension I10

 

 [place] [address] [place] [id number] [place] [address] 32207-
6206    Essential hypertension I10 and Type 2 diabetes mellitus 
without complication, without long-term current use of insulin E11.9

 

 [place] [id number] [place] [address]-
0853    Chronic kidney disease, stage 3 (moderate) N18.3 ; Anemia 
of chronic disease D63.8 and Type 2 diabetes mellitus without complication, 
without long-term current use of insulin E11.9

 

 [place] [address] [place] [id number] [place] [address] 48898-
0515    Type 2 diabetes mellitus without complication, without long-
term current use of insulin E11.9 ; Iron deficiency anemia secondary to 
inadequate dietary iron intake D50.8 ; Arthritis M19.90 and Lumbago with 
sciatica, left side M54.42

 

 [place] [address] [place] [address] 97839-
6880    Arthritis M19.90 and Anxiety F41.9

 

 [place] [address] [place] [address] 90848-
6623    Type 2 diabetes mellitus without complication, without long-
term current use of insulin E11.9 and Essential hypertension I10

 

 [place] [address] [place] [id number] [place] [address] 56323-
8534  22 May, 2017  Anxiety F41.9

 

 [place] [address] [place] [id number] [place] [address] 98372-
1869  18 May, 2017  Monilial rash B37.2

 

 [facility] [place]  [address] [place] [id number] [place] [address] 20878-
0190  16 May, 2017   

 

 [place] [address] [address]-
5573  15 May, 2017   

 

 [place] [address] [place] [id number] [place] [address] 24939-
7546  11 May, 2017   

 

 [place] [address] [address]-
1060  11 May, 2017  Gastroesophageal reflux disease, esophagitis presence not 
specified K21.9

 

 [place] [address] [place] [id number] [place] [address] 06716-
3406  09 May, 2017  Arthritis M19.90

 

 [place] [address] [place] [address] [place] [address] 24183-
0323  09 May, 2017  Anxiety F41.9 ; Arthritis M19.90 and Essential hypertension 
I10

 

 [place] [address] [place] [address] [place] [address] 76877-
5744  05 May, 2017  Essential hypertension I10 and Anxiety F41.9

 

 [place] [address] [place] [id number] [place] [address] 76896-
1799     

 

 [place] [address] [place] [id number] [place] [address] 26207-
2089    Arthritis M19.90 and Anxiety F41.9

 

 [place] [address] [place] [address] [place] [address] 16080-
3233    Type 2 diabetes mellitus without complication, without long-
term current use of insulin E11.9 ; Cellulitis of right lower extremity L03.115 
and Arthritis M19.90

 

 [place] [address] [place] [id number] [place] [address] 79968-
8850  28 Mar, 2017   

 

 [place] [address] [place] [address] [place] [address] 43687-
9676  20 Mar, 2017  Type 2 diabetes mellitus without complication, without long-
term current use of insulin E11.9 ; Bronchitis J40 and Arthritis M19.90

 

 [place] [address] [place] [id number] [place] [address] 31119-
7287  16 Mar, 2017   

 

 [place] [address] [address]-
7841  24 2017  Anxiety F41.9

 

 [facility] [place] [address] [place] [address] [id number] [place] [address] 11883
-[phone]    Dysuria R30.0 ; Acute cystitis with hematuria N30.01 and 
Vaginal yeast infection B37.3

 

 [place] [address] [place] [id number] [place] [address] 02936-
1299    Arthritis M19.90

 

 [facility] [place] [address] [place] [id number] [place] [address] [phone]
-[phone]    Strep pharyngitis J02.0 and Sore throat J02.9

 

 [place] [address] [place] [id number] [place] [address] 92320-
7589     

 

 [place] [address] [place] [address] [place] [address] 95686-
9001    Type 2 diabetes mellitus without complication, without long-
term current use of insulin E11.9

 

 [place] [address] [place] [address] [place] [address] 75673-
7715  14 Dec, 2016   

 

 [place] [address] [place] [address] [place] [address] 94718-
7079  13 Dec, 2016  Seasonal allergic rhinitis due to pollen J30.1

 

 [facility]  [address] [place] [place] [address] 95038
-[phone]  09 Dec, 2016  Impacted cerumen of right ear H61.21 and Seasonal allergic 
rhinitis due to pollen J30.1

 

 [facility] [place]  [address] [place] [id number] [place] [address] 96675-
6023  09 Dec, 2016   

 

 [facility] [place]  [address] [place] [id number] [place] [address] 21601-
0485  05 Dec, 2016   

 

 [facility] [place]  [address] [place] [id number] [place] [address] 78129-
4846    Type 2 diabetes mellitus without complication, without long-
term current use of insulin E11.9 ; Anxiety F41.9 ; Essential hypertension I10 
and Encounter for immunization Z23

 

 [facility] [place]  [address] [place] [address] [id number] [place] [address] 08354-
4544    Essential hypertension I10

 

 [place] [address] [address]672-
4972     

 

 [facility] [place]  [address] [place] [id number] [place] [address] 12472-
8366     

 

 [facility] [place]  [address] [place] [id number] [place] [address] 45666-
6283    Essential hypertension I10

 

 [facility] [place]  [address] [place] [id number] [place] [address] 60536-
6770     

 

 [facility] [place]  [address] [place] [id number] [place] [address] 24749-
8302  23 Sep, 2016   

 

 [facility] [place]  [address] [place] [id number] [place] [address] 18914-
3638  01 Sep, 2016   

 

 [facility] [place]  [address] [place] [id number] [place] [address] 24289-
0111  30 Aug, 2016   

 

 [facility] [place]  [address] [place] [address] [id number] [place] [address] 89854-
8686  24 Aug, 2016   

 

 [facility] [place]  [address] [id number] [place] [address] 63501-
2544  22 Aug, 2016   

 

 [facility] [place]  [address] [facility] [id number] [place] [address]-
3412  22 Aug, 2016  Type 2 diabetes mellitus without complication, without long-
term current use of insulin E11.9 ; Essential hypertension I10 ; Acute non-
recurrent maxillary sinusitis J01.00 ; Arthritis M19.90 ; Lumbago with sciatica
, left side M54.42 ; Other chronic pain G89.29 and Anxiety F41.9







IMMUNIZATIONS

No Known Immunizations



SOCIAL HISTORY

Never Assessed



REASON FOR VISIT

Ultracet and Alprazolam



PLAN OF CARE





VITAL SIGNS





MEDICATIONS







 Medication  Instructions  Dosage  Frequency  Start Date  End Date  Duration  
Status

 

 Tramadol-Acetaminophen 37.5-325 MG  Orally every 4 hrs  2 tablets as needed  
4h        28 days  Active

 

 Alprazolam 0.5 MG  Orally Three times a day  1 tablet  8h        30 days  
Active







RESULTS

No Results



PROCEDURES

No Known procedures



INSTRUCTIONS





MEDICATIONS ADMINISTERED

No Known Medications



MEDICAL (GENERAL) HISTORY







 Type  Description  Date

 

 Medical History  type II diabetes   

 

 Medical History  hypertension   

 

 Medical History  Arthritis   

 

 Medical History  skin cancer-scalp   

 

 Surgical History  hysterectomy   

 

 Surgical History  skin cancer removal-scalp   

 

 Hospitalization History  Surgery(s) only   

 

 Hospitalization History  dehydration  2017

 

 Hospitalization History  bronchitis  2107

## 2019-02-08 NOTE — XMS REPORT
[facility]

 Created on: 2016



Richi Sandrita

External Reference #: [id number]

: 1934

Sex: Female



Demographics







 Address  [address] [place]  [address]

 

 Home Phone  [phone]

 

 Preferred Language  Unknown

 

 Marital Status  Unknown

 

 Mormonism Affiliation  Unknown

 

 Race   or 

 

 Ethnic Group  Not  or 





Author







 Author  YULISA RANGEL

 

 Organization  [employer]

 

 Address  Unknown

 

 Phone  Unavailable







Care Team Providers







 Care Team Member Name  Role  Phone

 

 YULISA RANGEL  CP  Unavailable



                                                                



Allergies

          No Known Allergies                                                   
                                     



Problems

          





 Problem Type  Condition  Code  Onset Dates  Condition Status

 

 Problem  Essential hypertension  I10     Active

 

 Problem  Arthritis  M19.90     Active

 

 Problem  Type 2 diabetes mellitus without complication, without long-term 
current use of insulin  E11.9     Active

 

 Problem  Lumbago with sciatica, left side  M54.42     Active

 

 Problem  Anxiety  F41.9     Active



                                                                               
                                                 



Medications

          No Known Medications                                                 
                             



Results

          No Known Results                                                     
               



Summary Purpose

          [employer] Submission

## 2019-02-08 NOTE — XMS REPORT
[facility]

 Created on: 2018



Richi Sandrita

External Reference #: [id number]

: 1934

Sex: Female



Demographics







 Address  [address] [place]  [address]

 

 Preferred Language  Unknown

 

 Marital Status  Unknown

 

 Orthodox Affiliation  Unknown

 

 Race  Unknown

 

 Ethnic Group  Unknown





Author







 Author  YULISA RANGEL

 

 [facility]

 

 Address  [address] [place]  [address]



 

 Phone  [phone]







Care Team Providers







 Care Team Member Name  Role  Phone

 

 YULISA RANGEL  Unavailable  [phone]







PROBLEMS







 Type  Condition  ICD9-CM Code  RLZ86-YU Code  Onset Dates  Condition Status  
SNOMED Code

 

 Problem  Essential hypertension     I10     Active  22990074

 

 Problem  Type 2 diabetes mellitus without complication, without long-term 
current use of insulin     E11.9     Active  780761939

 

 Problem  Anxiety     F41.9     Active  63047362

 

 Problem  Lumbago with sciatica, left side     M54.42     Active  701954715

 

 Problem  Arthritis     M19.90     Active  1454914

 

 Problem  Iron deficiency anemia, unspecified iron deficiency anemia type     
D50.9     Active  05399026

 

 Problem  Venous insufficiency     I87.2     Active  69578422

 

 Problem  Gastroesophageal reflux disease, esophagitis presence not specified  
   K21.9     Active  467928165

 

 Problem  Seasonal allergic rhinitis due to pollen     J30.1     Active  
93072321

 

 Problem  Anemia of chronic disease     D63.8     Active  721798390

 

 Problem  Chronic kidney disease, stage 3 (moderate)     N18.3     Active  
706992707







ALLERGIES

No Information



ENCOUNTERS







 Encounter  Location  Date  Diagnosis

 

 [place] [address] [place] [address]-
7360  06 Sep, 2018  Essential hypertension I10 and Arthritis M19.90

 

 [place] [id number], [place] [address]-
1243  28 Aug, 2018   

 

 [place] [address] [place] [id number] [place] [address]26-
3405  16 Aug, 2018   

 

 [place] [address] [place] [id number] [place] [address] 13115-
2166  06 Aug, 2018   

 

 [place] [address], [place] [address]
1519    Essential hypertension I10

 

 [place] [address] [place] [id number] [place] [address]71-
1794    Type 2 diabetes mellitus without complication, without long-
term current use of insulin E11.9 ; Essential hypertension I10 and Anemia of 
chronic disease D63.8

 

 [place] [address] [place] [id number] [place] [address]14-
9927    Arthritis M19.90 and Essential hypertension I10

 

 [place] [address] [address]-
8165  15 May, 2018   

 

 [place] [address] [address]-
0847  15 May, 2018   

 

 [place] [address] [address]-
9157  15 May, 2018  Medicare annual wellness visit, initial Z00.00 ; Type 2 
diabetes mellitus without complication, without long-term current use of 
insulin E11.9 ; Chronic kidney disease, stage 3 (moderate) N18.3 ; Essential 
hypertension I10 ; Gastroesophageal reflux disease, esophagitis presence not 
specified K21.9 ; Anxiety F41.9 ; Arthritis M19.90 and Encounter for 
immunization Z23

 

 [place] [address] [address]-
7049  03 May, 2018  Essential hypertension I10

 

 [place] [address] [address]-
5065    Arthritis M19.90

 

 [place] [address] [place] [id number] [place] [address] 81412-
1568     

 

 [place] [address] [address]-
8010     

 

 [place] [address] [address]-
0516    Essential hypertension I10

 

 [facility]  [address] [place] [id number] [place] [address]20
-[phone]  27 Mar, 2018  Dysuria R30.0 and Vaginal candida B37.3

 

 [place] [address] [place] [id number] [place] [address] 86921-
0802  08 Mar, 2018  Arthritis M19.90

 

 [place] [address] [address]-
1366     

 

 [place] [address] [place] [id number] [place] [address] 54635-
8238    Type 2 diabetes mellitus without complication, without long-
term current use of insulin E11.9 ; Lumbago with sciatica, left side M54.42 ; 
Arthritis M19.90 ; Iron deficiency anemia, unspecified iron deficiency anemia 
type D50.9 and BMI 40.0-44.9, adult Z68.41

 

 [place] [address] [address]-
0936     

 

 [place] [address] [address]-
7298     

 

 [place] [address] [address]-
7225    Arthritis M19.90 and Anxiety F41.9

 

 [place] [address] [address]-
2579     

 

 [place] [address] [place] [id number] [place] [address] 58223-
6821  18 Dec, 2017  Anxiety F41.9

 

 [place] [address] [address]-
1948     

 

 [place] [address] [place] [id number] [place] [address] 13825-
6288     

 

 [place] [address] [address]09-
2033  17 2017  Vaginal yeast infection B37.3

 

 [place] [address] [place] [id number] [place] [address] 21907-
5033  14 2017  Anxiety F41.9

 

 [place] [address] [address]-
6509    Type 2 diabetes mellitus without complication, without long-
term current use of insulin E11.9

 

 [place] [address] [place] [id number] [place] [address] 95178-
1591     

 

 [facility]  [address] [place] [id number] [place] [address] 09286-
7101     

 

 [facility]  [address] [place] [id number] [place] [address] 94078-
7832  24 Oct, 2017  Arthritis M19.90

 

 [place] [address] [place] [id number] [place] [address] 22350-
6145  16 Oct, 2017   

 

 [place] [address] [place] [id number] [place] [address] 95509-
1573  04 Oct, 2017   

 

 [place] [address] [place] [id number] [place] [address] 47169-
1047  05 Sep, 2017   

 

 [place] [address] [place] [id number] [place] [address] 01014-
2846  25 Aug, 2017  Venous insufficiency I87.2 and Venous stasis dermatitis of 
right lower extremity I87.2

 

 [place] [address] [place] [id number] [place] [address] 84313-
7629  21 Aug, 2017  Essential hypertension I10

 

 [place] [address] [place] [id number] [place] [address] 83442-
0366     

 

 [place] [address] [place] [id number] [place] [address] 31919-
9137    Type 2 diabetes mellitus without complication, without long-
term current use of insulin E11.9 and Essential hypertension I10

 

 [place] [address] [place] [id number] [place] [address] 51100-
0606    Essential hypertension I10 and Type 2 diabetes mellitus 
without complication, without long-term current use of insulin E11.9

 

 [place] [address], [place] [address]-
3296    Chronic kidney disease, stage 3 (moderate) N18.3 ; Anemia 
of chronic disease D63.8 and Type 2 diabetes mellitus without complication, 
without long-term current use of insulin E11.9

 

 [place] [address] [address]-
5792  14 2017  Type 2 diabetes mellitus without complication, without long-
term current use of insulin E11.9 ; Iron deficiency anemia secondary to 
inadequate dietary iron intake D50.8 ; Arthritis M19.90 and Lumbago with 
sciatica, left side M54.42

 

 [place] [address] [address]-
9908    Arthritis M19.90 and Anxiety F41.9

 

 [place] [address] [address]-
9750    Type 2 diabetes mellitus without complication, without long-
term current use of insulin E11.9 and Essential hypertension I10

 

 [address] [address]-
6692  22 May, 2017  Anxiety F41.9

 

 [place] [address] [address]-
5517  18 May, 2017  Monilial rash B37.2

 

 [place] [address] [address]-
8473  16 May, 2017   

 

 [place] [address] [place] [id number] [place] [address] 92685-
1176  15 May, 2017   

 

 [place] [address] 0[address]32-
4181  11 May, 2017   

 

 [place] [address] [address]-
6371  11 May, 2017  Gastroesophageal reflux disease, esophagitis presence not 
specified K21.9

 

 [place] [address] [address]-
2431  09 May, 2017  Arthritis M19.90

 

 [place] [address] 0[address]-
1307  09 May, 2017  Anxiety F41.9 ; Arthritis M19.90 and Essential hypertension 
I10

 

 [place] [address] [place] [id number] [place] [address] 30686-
0250  05 May, 2017  Essential hypertension I10 and Anxiety F41.9

 

 [place] [address] [place] [id number] [place] [address] 00185-
8393     

 

 [place] [address] [address]689-
7544    Arthritis M19.90 and Anxiety F41.9

 

 [place] [address] [address]-
8516    Type 2 diabetes mellitus without complication, without long-
term current use of insulin E11.9 ; Cellulitis of right lower extremity L03.115 
and Arthritis M19.90

 

 [place] [address] [place] [id number] [place] [address] 00338-
2147  28 Mar, 2017   

 

 [place] [address] [address]-
6001  20 Mar, 2017  Type 2 diabetes mellitus without complication, without long-
term current use of insulin E11.9 ; Bronchitis J40 and Arthritis M19.90

 

 [place] [address] [place] [id number] [place] [address] 62316-
4865  16 Mar, 2017   

 

 [place] [address] [place] [id number] [place] [address] 63118-
0202    Anxiety F41.9

 

 [facility]  [address] [place] [address]
-[phone]    Dysuria R30.0 ; Acute cystitis with hematuria N30.01 and 
Vaginal yeast infection B37.3

 

 [place] [address] [place] [id number] [place] [address] 45058-
4329    Arthritis M19.90

 

 [facility]  [address] [place] [id number] [place] [address] 42688
-[phone]    Strep pharyngitis J02.0 and Sore throat J02.9

 

 [facility]  [address] [place] [id number] [place] [address] 14367-
2752     

 

 [place] [address] [address]-
7844    Type 2 diabetes mellitus without complication, without long-
term current use of insulin E11.9

 

 [place] [address] 7[address]30-
0823  14 Dec, 2016   

 

 [place] [address] 8[address]72-
4021  13 Dec, 2016  Seasonal allergic rhinitis due to pollen J30.1

 

 [facility] [place] [facility]  [address] [address]
-[phone]  09 Dec, 2016  Impacted cerumen of right ear H61.21 and Seasonal allergic 
rhinitis due to pollen J30.1

 

 [place] [address] [address]-
5839  09 Dec, 2016   

 

 [place] [address] 9[address]29-
3753  05 Dec, 2016   

 

 [place] [address] [address]-
7925    Type 2 diabetes mellitus without complication, without long-
term current use of insulin E11.9 ; Anxiety F41.9 ; Essential hypertension I10 
and Encounter for immunization Z23

 

 [place] [address] [place] [id number] [place] [address] 23266-
3841    Essential hypertension I10

 

 [place] [address] [place] [id number] [place] [address] 75809-
2154     

 

 [place] [address] 9[address]54-
1766     

 

 [place] [address] 87631-
1395    Essential hypertension I10

 

 [place] [address] [place] [address]-
7776     

 

 [facility]  [address] [facility] [id number] [address]-
2158  23 Sep, 2016   

 

 [facility]  [address] [place] [id number] [address]-
7357  01 Sep, 2016   

 

 [facility]  [address] [place] [id number] [address]-
1820  30 Aug, 2016   

 

 [facility]  [address], [place] [address]-
8792  24 Aug, 2016   

 

 [facility]  [address] [place] [id number] [address]296-
2903  22 Aug, 2016   

 

 [facility]  [address] [place] [id number] [address]-
6130  22 Aug, 2016  Type 2 diabetes mellitus without complication, without long-
term current use of insulin E11.9 ; Essential hypertension I10 ; Acute non-
recurrent maxillary sinusitis J01.00 ; Arthritis M19.90 ; Lumbago with sciatica
, left side M54.42 ; Other chronic pain G89.29 and Anxiety F41.9







IMMUNIZATIONS

No Known Immunizations



SOCIAL HISTORY

Never Assessed



REASON FOR VISIT

Controlled Med Refill



PLAN OF CARE





VITAL SIGNS





MEDICATIONS







 Medication  Instructions  Dosage  Frequency  Start Date  End Date  Duration  
Status

 

 Tramadol-Acetaminophen 37.5-325 MG  Orally every 4 hrs  2 tablets as needed  
4h        28 days  Active

 

 Alprazolam 0.5 MG  Orally Three times a day  1 tablet  8h        30 days  
Active







RESULTS

No Results



PROCEDURES

No Known procedures



INSTRUCTIONS





MEDICATIONS ADMINISTERED

No Known Medications



MEDICAL (GENERAL) HISTORY







 Type  Description  Date

 

 Medical History  type II diabetes   

 

 Medical History  hypertension   

 

 Medical History  Arthritis   

 

 Medical History  skin cancer-scalp   

 

 Surgical History  hysterectomy   

 

 Surgical History  skin cancer removal-scalp   

 

 Hospitalization History  Surgery(s) only   

 

 Hospitalization History  dehydration  2017

 

 Hospitalization History  bronchitis  2107

## 2019-02-08 NOTE — XMS REPORT
[facility]

 Created on: 2018



Richi Sandrita

External Reference #: [id number]

: 1934

Sex: Female



Demographics







 Address  [address] [place]  [address]

 

 Preferred Language  Unknown

 

 Marital Status  Unknown

 

 Congregation Affiliation  Unknown

 

 Race  Unknown

 

 Ethnic Group  Unknown





Author







 Author  YULISA RANGEL

 

 [facility]

 

 Address  [address] [place]  [address]



 

 Phone  [phone]







Care Team Providers







 Care Team Member Name  Role  Phone

 

 YULISA RANGEL  Unavailable  [phone]







PROBLEMS







 Type  Condition  ICD9-CM Code  SJW63-VI Code  Onset Dates  Condition Status  
SNOMED Code

 

 Problem  Essential hypertension     I10     Active  22493058

 

 Problem  Type 2 diabetes mellitus without complication, without long-term 
current use of insulin     E11.9     Active  416465986

 

 Problem  Anxiety     F41.9     Active  45288497

 

 Problem  Lumbago with sciatica, left side     M54.42     Active  074432216

 

 Problem  Arthritis     M19.90     Active  3768248

 

 Problem  Iron deficiency anemia, unspecified iron deficiency anemia type     
D50.9     Active  43182664

 

 Problem  Venous insufficiency     I87.2     Active  93452519

 

 Problem  Gastroesophageal reflux disease, esophagitis presence not specified  
   K21.9     Active  487401916

 

 Problem  Seasonal allergic rhinitis due to pollen     J30.1     Active  
64342089

 

 Problem  Anemia of chronic disease     D63.8     Active  701207668

 

 Problem  Chronic kidney disease, stage 3 (moderate)     N18.3     Active  
975310716







ALLERGIES

No Information



ENCOUNTERS







 Encounter  Location  Date  Diagnosis

 

 [facility]  [address] [place] [address]35-
1637    Essential hypertension I10

 

 [facility]  [address] [place] [address] 75258
-[phone]  27 Mar, 2018  Dysuria R30.0 and Vaginal candida B37.3

 

 [facility]  [id number] [place] [address]806-
9961  08 Mar, 2018  Arthritis M19.90

 

 [facility]  [id number] [place] [address] 47220-
5812     

 

 [facility]  [id number] [place] [address]206-
2063    Type 2 diabetes mellitus without complication, without long-
term current use of insulin E11.9 ; Lumbago with sciatica, left side M54.42 ; 
Arthritis M19.90 ; Iron deficiency anemia, unspecified iron deficiency anemia 
type D50.9 and BMI 40.0-44.9, adult Z68.41

 

 [facility]  [id number] [place] [address] 90938-
4390     

 

 [place] [address] [address]-
3936     

 

 [place] [address] [address]-
4054    Arthritis M19.90 and Anxiety F41.9

 

 [place] [address] [address]-
0819     

 

 [place] [address] [address]479-
6945  18 Dec, 2017  Anxiety F41.9

 

 [place] [address] [address]-
9988     

 

 [place] [address] [address]-
6916     

 

 [place] [id number] [place] [address] 23382-
8083    Vaginal yeast infection B37.3

 

 [place] [address] 93482-
8369    Anxiety F41.9

 

 [place] [id number] [place] [address] 19148-
6721    Type 2 diabetes mellitus without complication, without long-
term current use of insulin E11.9

 

 [place] [id number] [place] [address] 89841-
4102     

 

 [place] [address] [address]758-
5143     

 

 [place] [address] [address]
6734  24 Oct, 2017  Arthritis M19.90

 

 [place] [id number] [place] [address] 71688-
7164  16 Oct, 2017   

 

 [place] [address] [address]-
5659  04 Oct, 2017   

 

 [place] [id number] [place] [address] 82973-
2802  05 Sep, 2017   

 

 [place] [id number] [place] [address] 29904-
2629  25 Aug, 2017  Venous insufficiency I87.2 and Venous stasis dermatitis of 
right lower extremity I87.2

 

 [place] [id number] [place] [address] 80172-
9214  21 Aug, 2017  Essential hypertension I10

 

 [place] [id number] [place] [address] 43322-
3148     

 

 [place] [id number] [place] [address] 68564-
6335    Type 2 diabetes mellitus without complication, without long-
term current use of insulin E11.9 and Essential hypertension I10

 

 [place] [address] [address]-
1735    Essential hypertension I10 and Type 2 diabetes mellitus 
without complication, without long-term current use of insulin E11.9

 

 [place] [address] [address]-
9267    Chronic kidney disease, stage 3 (moderate) N18.3 ; Anemia 
of chronic disease D63.8 and Type 2 diabetes mellitus without complication, 
without long-term current use of insulin E11.9

 

 [place] [address] [address]-
0381    Type 2 diabetes mellitus without complication, without long-
term current use of insulin E11.9 ; Iron deficiency anemia secondary to 
inadequate dietary iron intake D50.8 ; Arthritis M19.90 and Lumbago with 
sciatica, left side M54.42

 

 [facility]  [id number] [place] [address] 00569-
5011    Arthritis M19.90 and Anxiety F41.9

 

 [facility]  [id number] [place] [address] 61219-
6465    Type 2 diabetes mellitus without complication, without long-
term current use of insulin E11.9 and Essential hypertension I10

 

 [facility]  [id number] [place] [address] 81845-
8330  22 May, 2017  Anxiety F41.9

 

 [place] [address] [address]-
0201  18 May, 2017  Monilial rash B37.2

 

 [place] [address] [address]-
7865  16 May, 2017   

 

 [facility]  [address] 61333-
0874  15 May, 2017   

 

 [facility]  [address] [address]683-
5375  11 May, 2017   

 

 [facility]  [address] [address]14-
6985  11 May, 2017  Gastroesophageal reflux disease, esophagitis presence not 
specified K21.9

 

 [facility]  [id number] [place] [address] 03636-
8947  09 May, 2017  Arthritis M19.90

 

 [facility]  [id number] [place] [address] 45893-
2124  09 May, 2017  Anxiety F41.9 ; Arthritis M19.90 and Essential hypertension 
I10

 

 [facility]  [address] 18285-
5925  05 May, 2017  Essential hypertension I10 and Anxiety F41.9

 

 [facility]  [id number] [place] [address] 91257-
9235     

 

 [place] [id number] [place] [address] 87731-
3045    Arthritis M19.90 and Anxiety F41.9

 

 [place] [address] [address]36-
7777    Type 2 diabetes mellitus without complication, without long-
term current use of insulin E11.9 ; Cellulitis of right lower extremity L03.115 
and Arthritis M19.90

 

 [place] [address] [address]65-
8337  28 Mar, 2017   

 

 [place] [address] [address]37-
6128  20 Mar, 2017  Type 2 diabetes mellitus without complication, without long-
term current use of insulin E11.9 ; Bronchitis J40 and Arthritis M19.90

 

 [place] [id number] [place] [address] 59271-
4748  16 Mar, 2017   

 

 [place] [address] 96138-
7495    Anxiety F41.9

 

 [facility]  [address] [address]
-[phone]    Dysuria R30.0 ; Acute cystitis with hematuria N30.01 and 
Vaginal yeast infection B37.3

 

 [address] [address] [address] [place] [address] 06404-
5533    Arthritis M19.90

 

 [facility]  [id number] [place] [address] 56841
-[phone]    Strep pharyngitis J02.0 and Sore throat J02.9

 

 [place] [address] [address]-
0838     

 

 [place] [address] [address]168-
9825    Type 2 diabetes mellitus without complication, without long-
term current use of insulin E11.9

 

 [place] [id number] [place] [address] 69715-
9842  14 Dec, 2016   

 

 [facility]  [id number] [place] [address] 80410-
2291  13 Dec, 2016  Seasonal allergic rhinitis due to pollen J30.1

 

 [facility]  [id number] [place] [address] 92382
-[phone]  09 Dec, 2016  Impacted cerumen of right ear H61.21 and Seasonal allergic 
rhinitis due to pollen J30.1

 

 [facility]  [id number] [place] [address] 60788-
0798  09 Dec, 2016   

 

 [facility]  [address] [address]455-
6046  05 Dec, 2016   

 

 [facility]  [id number] [place] [address] 48638-
2701    Type 2 diabetes mellitus without complication, without long-
term current use of insulin E11.9 ; Anxiety F41.9 ; Essential hypertension I10 
and Encounter for immunization Z23

 

 [facility]  [id number] [place] [address] 72491-
9966    Essential hypertension I10

 

 [place] [id number] [place] [address] 75609-
6843     

 

 [facility]  [id number] [place] [address] 33414-
8225     

 

 [facility]  [id number] [place] [address] 44444-
2299    Essential hypertension I10

 

 [facility]  [id number] [place] [address] 40324-
6400     

 

 [place] [address] [address]114-
1738  23 Sep, 2016   

 

 [facility]  [id number] [place] [address] 70875-
0713  01 Sep, 2016   

 

 [facility]  [address] [place] [address]-
7579  30 Aug, 2016   

 

 [facility]  [address] [facility] [id number] [address]-
6275  24 Aug, 2016   

 

 [facility]  [address] [facility] [id number] [address]-
2888  22 Aug, 2016   

 

 [facility]  [address] [facility] [id number] [address]-
9307  22 Aug, 2016  Type 2 diabetes mellitus without complication, without long-
term current use of insulin E11.9 ; Essential hypertension I10 ; Acute non-
recurrent maxillary sinusitis J01.00 ; Arthritis M19.90 ; Lumbago with sciatica
, left side M54.42 ; Other chronic pain G89.29 and Anxiety F41.9







IMMUNIZATIONS

No Known Immunizations



SOCIAL HISTORY

Never Assessed



REASON FOR VISIT

Waiting for call back



PLAN OF CARE





VITAL SIGNS





MEDICATIONS

Unknown Medications



RESULTS

No Results



PROCEDURES

No Known procedures



INSTRUCTIONS





MEDICATIONS ADMINISTERED

No Known Medications



MEDICAL (GENERAL) HISTORY







 Type  Description  Date

 

 Medical History  type II diabetes   

 

 Medical History  hypertension   

 

 Medical History  Arthritis   

 

 Medical History  skin cancer-scalp   

 

 Surgical History  hysterectomy   

 

 Surgical History  skin cancer removal-scalp   

 

 Hospitalization History  Surgery(s) only   

 

 Hospitalization History  dehydration  2017

 

 Hospitalization History  bronchitis  2107

## 2019-02-15 ENCOUNTER — HOSPITAL ENCOUNTER (INPATIENT)
Dept: HOSPITAL 75 - ER | Age: 84
LOS: 4 days | Discharge: HOME | DRG: 603 | End: 2019-02-19
Attending: INTERNAL MEDICINE | Admitting: INTERNAL MEDICINE
Payer: MEDICARE

## 2019-02-15 VITALS — DIASTOLIC BLOOD PRESSURE: 55 MMHG | SYSTOLIC BLOOD PRESSURE: 149 MMHG

## 2019-02-15 VITALS — DIASTOLIC BLOOD PRESSURE: 51 MMHG | SYSTOLIC BLOOD PRESSURE: 145 MMHG

## 2019-02-15 VITALS — SYSTOLIC BLOOD PRESSURE: 136 MMHG | DIASTOLIC BLOOD PRESSURE: 45 MMHG

## 2019-02-15 VITALS — HEIGHT: 65 IN | BODY MASS INDEX: 26.34 KG/M2 | WEIGHT: 158.1 LBS

## 2019-02-15 VITALS — DIASTOLIC BLOOD PRESSURE: 43 MMHG | SYSTOLIC BLOOD PRESSURE: 140 MMHG

## 2019-02-15 VITALS — SYSTOLIC BLOOD PRESSURE: 131 MMHG | DIASTOLIC BLOOD PRESSURE: 88 MMHG

## 2019-02-15 VITALS — DIASTOLIC BLOOD PRESSURE: 60 MMHG | SYSTOLIC BLOOD PRESSURE: 137 MMHG

## 2019-02-15 VITALS — DIASTOLIC BLOOD PRESSURE: 53 MMHG | SYSTOLIC BLOOD PRESSURE: 151 MMHG

## 2019-02-15 VITALS — DIASTOLIC BLOOD PRESSURE: 57 MMHG | SYSTOLIC BLOOD PRESSURE: 145 MMHG

## 2019-02-15 VITALS — SYSTOLIC BLOOD PRESSURE: 131 MMHG | DIASTOLIC BLOOD PRESSURE: 66 MMHG

## 2019-02-15 VITALS — SYSTOLIC BLOOD PRESSURE: 131 MMHG | DIASTOLIC BLOOD PRESSURE: 53 MMHG

## 2019-02-15 VITALS — SYSTOLIC BLOOD PRESSURE: 143 MMHG | DIASTOLIC BLOOD PRESSURE: 52 MMHG

## 2019-02-15 VITALS — SYSTOLIC BLOOD PRESSURE: 126 MMHG | DIASTOLIC BLOOD PRESSURE: 45 MMHG

## 2019-02-15 VITALS — DIASTOLIC BLOOD PRESSURE: 49 MMHG | SYSTOLIC BLOOD PRESSURE: 137 MMHG

## 2019-02-15 DIAGNOSIS — I87.2: ICD-10-CM

## 2019-02-15 DIAGNOSIS — I12.9: ICD-10-CM

## 2019-02-15 DIAGNOSIS — L97.529: ICD-10-CM

## 2019-02-15 DIAGNOSIS — L03.116: Primary | ICD-10-CM

## 2019-02-15 DIAGNOSIS — N18.9: ICD-10-CM

## 2019-02-15 DIAGNOSIS — L02.416: ICD-10-CM

## 2019-02-15 DIAGNOSIS — N17.9: ICD-10-CM

## 2019-02-15 DIAGNOSIS — M40.205: ICD-10-CM

## 2019-02-15 DIAGNOSIS — E11.621: ICD-10-CM

## 2019-02-15 DIAGNOSIS — E87.2: ICD-10-CM

## 2019-02-15 DIAGNOSIS — D64.9: ICD-10-CM

## 2019-02-15 DIAGNOSIS — E83.42: ICD-10-CM

## 2019-02-15 DIAGNOSIS — E87.5: ICD-10-CM

## 2019-02-15 DIAGNOSIS — K21.9: ICD-10-CM

## 2019-02-15 DIAGNOSIS — B95.62: ICD-10-CM

## 2019-02-15 LAB
ALBUMIN SERPL-MCNC: 4.2 GM/DL (ref 3.2–4.5)
ALP SERPL-CCNC: 82 U/L (ref 40–136)
ALT SERPL-CCNC: 24 U/L (ref 0–55)
BASOPHILS # BLD AUTO: 0 10^3/UL (ref 0–0.1)
BASOPHILS NFR BLD AUTO: 0 % (ref 0–10)
BILIRUB SERPL-MCNC: 0.2 MG/DL (ref 0.1–1)
BUN/CREAT SERPL: 22
BUN/CREAT SERPL: 23
CALCIUM SERPL-MCNC: 10 MG/DL (ref 8.5–10.1)
CALCIUM SERPL-MCNC: 9 MG/DL (ref 8.5–10.1)
CHLORIDE SERPL-SCNC: 111 MMOL/L (ref 98–107)
CHLORIDE SERPL-SCNC: 117 MMOL/L (ref 98–107)
CO2 SERPL-SCNC: 11 MMOL/L (ref 21–32)
CO2 SERPL-SCNC: 12 MMOL/L (ref 21–32)
CREAT SERPL-MCNC: 1.63 MG/DL (ref 0.6–1.3)
CREAT SERPL-MCNC: 1.83 MG/DL (ref 0.6–1.3)
EOSINOPHIL # BLD AUTO: 0.1 10^3/UL (ref 0–0.3)
EOSINOPHIL NFR BLD AUTO: 1 % (ref 0–10)
ERYTHROCYTE [DISTWIDTH] IN BLOOD BY AUTOMATED COUNT: 14 % (ref 10–14.5)
GFR SERPLBLD BASED ON 1.73 SQ M-ARVRAT: 26 ML/MIN
GFR SERPLBLD BASED ON 1.73 SQ M-ARVRAT: 30 ML/MIN
GLUCOSE SERPL-MCNC: 152 MG/DL (ref 70–105)
GLUCOSE SERPL-MCNC: 177 MG/DL (ref 70–105)
HCT VFR BLD CALC: 34 % (ref 35–52)
HGB BLD-MCNC: 10.8 G/DL (ref 11.5–16)
LYMPHOCYTES # BLD AUTO: 1.3 X 10^3 (ref 1–4)
LYMPHOCYTES NFR BLD AUTO: 12 % (ref 12–44)
MANUAL DIFFERENTIAL PERFORMED BLD QL: NO
MCH RBC QN AUTO: 31 PG (ref 25–34)
MCHC RBC AUTO-ENTMCNC: 32 G/DL (ref 32–36)
MCV RBC AUTO: 97 FL (ref 80–99)
MONOCYTES # BLD AUTO: 0.5 X 10^3 (ref 0–1)
MONOCYTES NFR BLD AUTO: 4 % (ref 0–12)
NEUTROPHILS # BLD AUTO: 9 X 10^3 (ref 1.8–7.8)
NEUTROPHILS NFR BLD AUTO: 83 % (ref 42–75)
PLATELET # BLD: 341 10^3/UL (ref 130–400)
PMV BLD AUTO: 10.1 FL (ref 7.4–10.4)
POTASSIUM SERPL-SCNC: 4.9 MMOL/L (ref 3.6–5)
POTASSIUM SERPL-SCNC: 6.8 MMOL/L (ref 3.6–5)
PROT SERPL-MCNC: 8.9 GM/DL (ref 6.4–8.2)
SODIUM SERPL-SCNC: 135 MMOL/L (ref 135–145)
SODIUM SERPL-SCNC: 138 MMOL/L (ref 135–145)
WBC # BLD AUTO: 10.8 10^3/UL (ref 4.3–11)

## 2019-02-15 PROCEDURE — 93005 ELECTROCARDIOGRAM TRACING: CPT

## 2019-02-15 PROCEDURE — 87186 SC STD MICRODIL/AGAR DIL: CPT

## 2019-02-15 PROCEDURE — 71045 X-RAY EXAM CHEST 1 VIEW: CPT

## 2019-02-15 PROCEDURE — 80202 ASSAY OF VANCOMYCIN: CPT

## 2019-02-15 PROCEDURE — 96361 HYDRATE IV INFUSION ADD-ON: CPT

## 2019-02-15 PROCEDURE — 76999 ECHO EXAMINATION PROCEDURE: CPT

## 2019-02-15 PROCEDURE — 80048 BASIC METABOLIC PNL TOTAL CA: CPT

## 2019-02-15 PROCEDURE — 36415 COLL VENOUS BLD VENIPUNCTURE: CPT

## 2019-02-15 PROCEDURE — 80053 COMPREHEN METABOLIC PANEL: CPT

## 2019-02-15 PROCEDURE — 87077 CULTURE AEROBIC IDENTIFY: CPT

## 2019-02-15 PROCEDURE — 84100 ASSAY OF PHOSPHORUS: CPT

## 2019-02-15 PROCEDURE — 83605 ASSAY OF LACTIC ACID: CPT

## 2019-02-15 PROCEDURE — 84132 ASSAY OF SERUM POTASSIUM: CPT

## 2019-02-15 PROCEDURE — 87070 CULTURE OTHR SPECIMN AEROBIC: CPT

## 2019-02-15 PROCEDURE — 87040 BLOOD CULTURE FOR BACTERIA: CPT

## 2019-02-15 PROCEDURE — 85025 COMPLETE CBC W/AUTO DIFF WBC: CPT

## 2019-02-15 PROCEDURE — 10061 I&D ABSCESS COMP/MULTIPLE: CPT

## 2019-02-15 PROCEDURE — 83735 ASSAY OF MAGNESIUM: CPT

## 2019-02-15 PROCEDURE — 96375 TX/PRO/DX INJ NEW DRUG ADDON: CPT

## 2019-02-15 PROCEDURE — 94760 N-INVAS EAR/PLS OXIMETRY 1: CPT

## 2019-02-15 PROCEDURE — 0H9LXZZ DRAINAGE OF LEFT LOWER LEG SKIN, EXTERNAL APPROACH: ICD-10-PCS | Performed by: NURSE PRACTITIONER

## 2019-02-15 PROCEDURE — 87205 SMEAR GRAM STAIN: CPT

## 2019-02-15 PROCEDURE — 96365 THER/PROPH/DIAG IV INF INIT: CPT

## 2019-02-15 PROCEDURE — 82962 GLUCOSE BLOOD TEST: CPT

## 2019-02-15 RX ADMIN — SODIUM CHLORIDE SCH MLS/HR: 900 INJECTION, SOLUTION INTRAVENOUS at 17:26

## 2019-02-15 RX ADMIN — SODIUM CHLORIDE SCH MLS/HR: 4.5 INJECTION, SOLUTION INTRAVENOUS at 19:50

## 2019-02-15 RX ADMIN — POLYETHYLENE GLYCOL (3350) SCH GM: 17 POWDER, FOR SOLUTION ORAL at 21:31

## 2019-02-15 RX ADMIN — ACETAMINOPHEN PRN MG: 500 TABLET ORAL at 23:50

## 2019-02-15 RX ADMIN — INSULIN ASPART SCH UNIT: 100 INJECTION, SOLUTION INTRAVENOUS; SUBCUTANEOUS at 21:31

## 2019-02-15 RX ADMIN — SODIUM CHLORIDE SCH MLS/HR: 900 INJECTION, SOLUTION INTRAVENOUS at 21:33

## 2019-02-15 NOTE — NUR
Dr. Saldana done with Central line, xray in room, Dr. Saldana looked at xray in room and stated 
that the central line was good to use.

## 2019-02-15 NOTE — CONSULTATION REPORT
DATE OF SERVICE:  02/15/2019



ATTENDING PRIMARY CARE PHYSICIAN:

Pending sale to [facility].



HISTORY OF PRESENT ILLNESS:

The patient is an 84-year-old female who presented to the Emergency Department

with a persistent and slightly worsening pain and swelling in the left lower

extremity.  She states that this started approximately five days ago and was

seen at the time and was started on Bactrim; however, returns and states that

the lesion and pain has persisted with redness and erythema.  An ultrasound was

performed which showed a small area of fluid collection, which may have been

consistent with an early abscess and was incised and drained in the emergency

room by Emergency Department staff.  She does not report any systemic symptoms,

fever or chills.



PAST MEDICAL HISTORY:

Diabetes, glaucoma, and gastroesophageal reflux disease.



PAST SURGICAL HISTORY:

Hysterectomy, eye surgery.



ALLERGIES:

DIAZEPAM.



MEDICATIONS:

Alprazolam 0.5 mg t.i.d., aspirin 81 mg daily, Nexium 40 mg daily, lisinopril 5

mg daily, metformin 500 mg b.i.d., metoprolol 25 mg b.i.d., and tramadol q.i.d.



SOCIAL HISTORY:

Negative smoke, negative alcohol.



FAMILY HISTORY:

Noncontributory.



REVIEW OF SYSTEMS:

Well-nourished female, currently in no acute distress.  She does report pain and

persistent redness and swelling of the left lower extremity.  She does also

report some mild nausea; however, no vomiting.  No diarrhea, no constipation. 

No fever or chills, no recent inadvertent weight loss.  All other review of

systems is negative.



PHYSICAL EXAMINATION:

VITAL SIGNS:  Temperature is 97.5, blood pressure 137/46, pulse 81, respirations

16, and pulse ox 97% on room air.

CHEST:  Few scattered rales bilaterally.

HEART:  Regular, no murmurs.

EXTREMITIES:  Redness and erythema of left lower extremity with a recent

incision and drainage, palpable distal pulses bilaterally.

HEENT:  No scleral icterus.

NECK:  No cervical lymphadenopathy.

ABDOMEN:  Soft, nontender, and nondistended.

SKIN:  Warm, dry.



LABORATORY DATA:

WBC is 10.8, hemoglobin 10.8, hematocrit 34, and platelets 341.  Potassium is

7.0, BUN 41, and creatinine 1.83.



ASSESSMENT AND PLAN:

An 84-year-old female with cellulitis and early abscess status post incision and

drainage of the left lower extremity.  We will continue to monitor her progress

and proceed with wound care and pack on a b.i.d. basis and await the culture

results to tailor our antibiotic regimen.  She will also require central venous

catheter due to very poor peripheral venous circulation which we will proceed

with as well.





Job ID: [id number]

DocumentID: [id number]

Dictated Date:  02/15/2019 15:19:55

Transcription Date: 02/15/2019 15:57:55

Dictated By: EMILY BOUDREAUX MD

## 2019-02-15 NOTE — NUR
ARIANA BEJARANO admitted to room , with an admitting diagnosis of CELLULITIS, on 
02/15/19 from [place] via WHEELCHAIR, accompanied by STAFF.ARIANA BEJARANO introduced to 
surroundings, call light, bed controls, phone, TV, temperature control, lights, meal times, 
smoking policy, visitor policy, side rail policy, bathrooms and showers.  Patient Rights 
given to patient in the handbook. ARIANA BEJARANO verbalizes understanding that [facility] 
is not responsible for the loss or damage to any personal effects or valuables that are kept 
in the patients posession during their hospitalization.  The following Patient Care Plans 
were discussed with the PT: Discharge Planning. ARIANA BEJARANO verbalizes understanding of 
Interdisciplinary Patient Education. Patient and/or family were informed about the Rapid 
Response Team and its purpose.

## 2019-02-15 NOTE — NUR
AFTER TALKING WITH DIRECTORS IN ICU AT STAFFING THEY STATE RESPIT CARE IS NOT 
AVAILABLE AT THIS TIME.  DAUGHTER NOTIFIED WHO WAS TALKING TO [place] FROM 
PASTORAL CARE.

## 2019-02-15 NOTE — XMS REPORT
Continuity of Care Document

 Created on: 02/15/2019



ARIANA BEJARANO

External Reference #: [id number]

: 1934

Sex: Female



Demographics







 Address  [address]

[place]  [address]

 

 Home Phone  [phone] x

 

 Preferred Language  Unknown

 

 Marital Status  Unknown

 

 Congregational Affiliation  Unknown

 

 Race  Unknown

 

 Ethnic Group  Unknown





Author







 Author  [facility]

 

 Organization  [facility]

 

 Address  Unknown

 

 Phone  Unavailable



              



Allergies

      





 Active            Description            Code            Type            
Severity            Reaction            Onset            Reported/Identified   
         Relationship to Patient            Clinical Status        

 

 Yes            diazepam            [id number]            Drug Allergy         
   Moderate            RASH                         2010                 
                 



                  



Medications

      



There is no data.                  



Problems

      





 Date Dx Coded            Attending            Type            Code            
Diagnosis            Diagnosed By        

 

 2010                         Ot            729.5                        
          

 

 2010                         Ot            490                          
        

 

 2010                         Ot            786.2                        
          

 

 2012                         Ot            785.1                        
          

 

 2015            FAISAL MILLER APRN            Ot            250.80     
       DIAB W OTH SPEC MANIFEST, TYPE II OR UNS                     

 

 2015            FAISAL MILLER APRN            Ot            403.90     
       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                     

 

 2015            FAISAL MILLER APRN            Ot            490        
    BRONCHITIS NOS                     

 

 2015            FAISAL MILLER APRN            Ot            585.9      
      CHRONIC KIDNEY DISEASE, UNSPECIFIED                     

 

 2015            FAISAL MILLER APRN            Ot            780.79     
       OTH MALAISE FATIGUE                     

 

 2015            FAISAL MILLER APRN            Ot            786.2      
      COUGH                     

 

 2015            FAISAL MILLER APRN            Ot            V58.69     
       OTH MED,LT,CURRENT USE                     

 

 2015                         Ot            V76.12                       
           

 

 2015                         Ot            V76.12                       
           

 

 2015            FAISAL MILLER APRN            Ot            466.0      
      ACUTE BRONCHITIS                     

 

 2015            FAISAL MILLER APRN            Ot            786.2      
      COUGH                     

 

 2017            SARA OSBORNE MD            Ot            E11.9      
      TYPE 2 DIABETES MELLITUS WITHOUT COMPLIC                     

 

 2017            SARA OSBORNE MD            Ot            E86.0      
      DEHYDRATION                     

 

 2017            SARA OSBORNE MD            Ot            I10        
    ESSENTIAL (PRIMARY) HYPERTENSION                     

 

 2017            SARA OSBORNE MD            Ot            J21.9      
      ACUTE BRONCHIOLITIS, UNSPECIFIED                     

 

 2017            SARA OSBORNE MD            Ot            J45.909    
        UNSPECIFIED ASTHMA, UNCOMPLICATED                     

 

 2017            SARA OSBORNE MD            Ot            K21.9      
      GASTRO-ESOPHAGEAL REFLUX DISEASE WITHOUT                     

 

 2017            SARA OSBORNE MD            Ot            R19.7      
      DIARRHEA, UNSPECIFIED                     

 

 2017            SARA OSBORNE MD            Ot            E11.9      
      TYPE 2 DIABETES MELLITUS WITHOUT COMPLIC                     

 

 2017            SARA OSBORNE MD            Ot            E86.0      
      DEHYDRATION                     

 

 2017            SARA OSBORNE MD            Ot            I10        
    ESSENTIAL (PRIMARY) HYPERTENSION                     

 

 2017            SARA OSBORNE MD            Ot            J21.9      
      ACUTE BRONCHIOLITIS, UNSPECIFIED                     

 

 2017            SARA OSBORNE MD            Ot            J45.909    
        UNSPECIFIED ASTHMA, UNCOMPLICATED                     

 

 2017            SARA OSBORNE MD            Ot            K21.9      
      GASTRO-ESOPHAGEAL REFLUX DISEASE WITHOUT                     

 

 2017            ASRA OSBORNE MD            Ot            R19.7      
      DIARRHEA, UNSPECIFIED                     

 

 2019            SHIN FREITAS MD            Ot            E11.9       
     TYPE 2 DIABETES MELLITUS WITHOUT COMPLIC                     

 

 2019            SHIN FREITAS MD            Ot            I10         
   ESSENTIAL (PRIMARY) HYPERTENSION                     

 

 2019            SHIN FREITAS MD            Ot            K21.9       
     GASTRO-ESOPHAGEAL REFLUX DISEASE WITHOUT                     

 

 2019            SHIN FREITAS MD            Ot            M25.561     
       PAIN IN RIGHT KNEE                     

 

 2019            SHIN FREITAS MD            Ot            M25.562     
       PAIN IN LEFT KNEE                     

 

 2019            SHIN FREITAS MD            Ot            R40.2142    
        COMA SCALE, EYES OPEN, SPONTANEOUS, EMR                     

 

 2019            SHIN FREITAS MD            Ot            R40.2252    
        COMA SCALE, BEST VERBAL RESPONSE, ORIENT                     

 

 2019            SHIN FREITAS MD            Ot            R40.2362    
        COMA SCALE, BEST MOTOR RESPONSE, OBEYS C                     

 

 2019            SHIN FREITAS MD            Ot            W05.0XXA    
        FALL FROM NON-MOVING WHEELCHAIR, INITIAL                     

 

 2019            SHIN FREITAS MD            Ot            W22.09XA    
        STRIKING AGAINST OTHER STATIONARY OBJECT                     

 

 2019            SHIN FREITAS MD            Ot            Z79.82      
      LONG TERM (CURRENT) USE OF ASPIRIN                     

 

 2019            SHIN FREITAS MD            Ot            Z79.84      
      LONG TERM (CURRENT) USE OF ORAL HYPOGLYC                     

 

 2019            SHIN FREITAS MD            Ot            Z85.828     
       PERSONAL HISTORY OF OTHER MALIGNANT NEOP                     

 

 2019            SHIN FREITAS MD            Ot            Z88.8       
     ALLERGY STATUS TO OTH DRUG/MEDS/BIOL SUB                     

 

 2019            SHIN FREITAS MD            Ot            Z90.710     
       ACQUIRED ABSENCE OF BOTH CERVIX AND UTER                     

 

 2019            SHIN FREITAS MD            Ot            E11.9       
     TYPE 2 DIABETES MELLITUS WITHOUT COMPLIC                     

 

 2019            SHIN FREITAS MD            Ot            I10         
   ESSENTIAL (PRIMARY) HYPERTENSION                     

 

 2019            SHIN FREITAS MD, Ot            K21.9       
     GASTRO-ESOPHAGEAL REFLUX DISEASE WITHOUT                     

 

 2019            SHIN FREITAS MD            Ot            M25.561     
       PAIN IN RIGHT KNEE                     

 

 2019            SHIN FREITAS MD            Ot            M25.562     
       PAIN IN LEFT KNEE                     

 

 2019            SHIN FREITAS MD            Ot            R40.2142    
        COMA SCALE, EYES OPEN, SPONTANEOUS, EMR                     

 

 2019            SHIN FREITAS MD            Ot            R40.2252    
        COMA SCALE, BEST VERBAL RESPONSE, ORIENT                     

 

 2019            SHIN FREITAS MD            Ot            R40.2362    
        COMA SCALE, BEST MOTOR RESPONSE, OBEYS C                     

 

 2019            SHIN FREITAS MD            Ot            W05.0XXA    
        FALL FROM NON-MOVING WHEELCHAIR, INITIAL                     

 

 2019            SHIN FREITAS MD            Ot            W22.09XA    
        STRIKING AGAINST OTHER STATIONARY OBJECT                     

 

 2019            SHIN FREITAS MD            Ot            Z79.82      
      LONG TERM (CURRENT) USE OF ASPIRIN                     

 

 2019            SHIN FREITAS MD            Ot            Z79.84      
      LONG TERM (CURRENT) USE OF ORAL HYPOGLYC                     

 

 2019            SHIN FREITAS MD            Ot            Z85.828     
       PERSONAL HISTORY OF OTHER MALIGNANT NEOP                     

 

 2019            SHIN FREITAS MD, Ot            Z88.8       
     ALLERGY STATUS TO OT DRUG/MEDS/BIOL SUB                     

 

 2019            SHIN FREITAS MD, Ot            Z90.710     
       ACQUIRED ABSENCE OF BOTH CERVIX AND UTER                     

 

 2019            ADITYA CROW MD            Ot            E11.9       
     TYPE 2 DIABETES MELLITUS WITHOUT COMPLIC                     

 

 2019            ADITYA CROW MD            Ot            I10         
   ESSENTIAL (PRIMARY) HYPERTENSION                     

 

 2019            ADITYA CROW MD            Ot            K21.9       
     GASTRO-ESOPHAGEAL REFLUX DISEASE WITHOUT                     

 

 2019            ADITYA CROW MD            Ot            L03.116     
       CELLULITIS OF LEFT LOWER LIMB                     

 

 2019            ADITYA CROW MD            Ot            M79.89      
      OTHER SPECIFIED SOFT TISSUE DISORDERS                     

 

 2019            ADITYA CROW MD            Ot            Z79.82      
      LONG TERM (CURRENT) USE OF ASPIRIN                     

 

 2019            ADITYA CROW MD            Ot            Z79.84      
      LONG TERM (CURRENT) USE OF ORAL HYPOGLYC                     

 

 2019            ADITYA CROW MD            Ot            Z85.828     
       PERSONAL HISTORY OF OTHER MALIGNANT NEOP                     

 

 2019            ADITYA CROW MD            Ot            Z88.8       
     ALLERGY STATUS TO [facility] DRUG/MEDS/BIOL SUB                     

 

 2019            ADITYA CROW MD            Ot            Z90.710     
       ACQUIRED ABSENCE OF BOTH CERVIX AND UTER                     



                                                                               
                                                                           



Procedures

      



There is no data.                  



Results

      





 Test            Result            Range        









 Urine Culture, Routine - 17 15:27         









 Urine Culture, Routine            Note                      









 Bacterial blood culture - 17 03:35         









 Bacterial blood culture            NG             NRG        









 Complete blood count (CBC) with automated white blood cell (WBC) differential 
- 17 03:44         









 Blood leukocytes automated count (number/volume)            11.4 10*3/uL      
      4.3-11.0        

 

 Blood erythrocytes automated count (number/volume)            3.58 10*6/uL    
        4.35-5.85        

 

 Venous blood hemoglobin measurement (mass/volume)            10.3 g/dL        
    11.5-16.0        

 

 Blood hematocrit (volume fraction)            33 %            35-52        

 

 Automated erythrocyte mean corpuscular volume            93 [foz_us]          
  80-99        

 

 Automated erythrocyte mean corpuscular hemoglobin (mass per erythrocyte)      
      29 pg            25-34        

 

 Automated erythrocyte mean corpuscular hemoglobin concentration measurement (
mass/volume)            31 g/dL            32-36        

 

 Automated erythrocyte distribution width ratio            15.6 %            
10.0-14.5        

 

 Automated blood platelet count (count/volume)            277 10*3/uL          
  130-400        

 

 Automated blood platelet mean volume measurement            10.2 [foz_us]     
       7.4-10.4        

 

 Automated blood neutrophils/100 leukocytes            83 %            42-75   
     

 

 Automated blood lymphocytes/100 leukocytes            9 %            12-44    
    

 

 Blood monocytes/100 leukocytes            8 %            0-12        

 

 Automated blood eosinophils/100 leukocytes            1 %            0-10     
   

 

 Automated blood basophils/100 leukocytes            0 %            0-10        

 

 Blood neutrophils automated count (number/volume)            9.5 10*3         
   1.8-7.8        

 

 Blood lymphocytes automated count (number/volume)            1.0 10*3         
   1.0-4.0        

 

 Blood monocytes automated count (number/volume)            0.9 10*3            
0.0-1.0        

 

 Automated eosinophil count            0.1 10*3/uL            0.0-0.3        

 

 Automated blood basophil count (count/volume)            0.0 10*3/uL          
  0.0-0.1        









 Blood lactic acid measurement (moles/volume) - 17 03:44         









 Blood lactic acid measurement (moles/volume)            2.48 mmol/L            
0.50-2.00        









 Comprehensive metabolic panel - 17 03:44         









 Serum or plasma sodium measurement (moles/volume)            137 mmol/L       
     135-145        

 

 Serum or plasma potassium measurement (moles/volume)            5.2 mmol/L    
        3.6-5.0        

 

 Serum or plasma chloride measurement (moles/volume)            109 mmol/L     
               

 

 Carbon dioxide            12 mmol/L            21-32        

 

 Serum or plasma anion gap determination (moles/volume)            16 mmol/L   
         5-14        

 

 Serum or plasma urea nitrogen measurement (mass/volume)            40 mg/dL   
         7-18        

 

 Serum or plasma creatinine measurement (mass/volume)            1.98 mg/dL    
        0.60-1.30        

 

 Serum or plasma urea nitrogen/creatinine mass ratio            20             
NRG        

 

 Serum or plasma creatinine measurement with calculation of estimated 
glomerular filtration rate            24             NRG        

 

 Serum or plasma glucose measurement (mass/volume)            162 mg/dL        
            

 

 Serum or plasma calcium measurement (mass/volume)            9.4 mg/dL        
    8.5-10.1        

 

 Serum or plasma total bilirubin measurement (mass/volume)            0.1 mg/dL
            0.1-1.0        

 

 Serum or plasma alkaline phosphatase measurement (enzymatic activity/volume)  
          48 U/L                    

 

 Serum or plasma aspartate aminotransferase measurement (enzymatic activity/
volume)            17 U/L            5-34        

 

 Serum or plasma alanine aminotransferase measurement (enzymatic activity/volume
)            14 U/L            0-55        

 

 Serum or plasma protein measurement (mass/volume)            8.1 g/dL         
   6.4-8.2        

 

 Serum or plasma albumin measurement (mass/volume)            4.2 g/dL         
   3.2-4.5        









 Magnesium - 17 03:44         









 Magnesium            2.2 mg/dL            1.8-2.4        









 Bacterial blood culture - 17 03:44         









 Bacterial blood culture            NG             NRG        









 Complete urinalysis with reflex to culture - 17 04:26         









 Urine color determination            YELLOW             NRG        

 

 Urine clarity determination            CLEAR             NRG        

 

 Urine pH measurement by test strip            5             5-9        

 

 Specific gravity of urine by test strip            1.020             1.016-
1.022        

 

 Urine protein assay by test strip, semi-quantitative            2+             
NEGATIVE        

 

 Urine glucose detection by automated test strip            NEGATIVE           
  NEGATIVE        

 

 Erythrocytes detection in urine sediment by light microscopy            1+    
         NEGATIVE        

 

 Urine ketones detection by automated test strip            NEGATIVE           
  NEGATIVE        

 

 Urine nitrite detection by test strip            NEGATIVE             NEGATIVE
        

 

 Urine total bilirubin detection by test strip            NEGATIVE             
NEGATIVE        

 

 Urine urobilinogen measurement by automated test strip (mass/volume)          
  NORMAL             NORMAL        

 

 Urine leukocyte esterase detection by dipstick            1+             
NEGATIVE        

 

 Automated urine sediment erythrocyte count by microscopy (number/high power 
field)             [HPF]            NRG        

 

 Automated urine sediment leukocyte count by microscopy (number/high power field
)            RARE             NRG        

 

 Bacteria detection in urine sediment by light microscopy            TRACE     
        NRG        

 

 Squamous epithelial cells detection in urine sediment by light microscopy     
       0-2             NRG        

 

 Crystals detection in urine sediment by light microscopy            NONE      
       NRG        

 

 Casts detection in urine sediment by light microscopy            PRESENT      
       NRG        

 

 Mucus detection in urine sediment by light microscopy            MODERATE     
        NRG        

 

 Complete urinalysis with reflex to culture            NO             NRG      
  

 

 Hyaline casts detection in urine sediment by light microscopy            5-10 
            NRG        









 Serum or plasma lactate measurement (moles/volume) - 17 06:27         









 Serum or plasma lactate measurement (moles/volume)            2.28 mmol/L     
       0.50-2.00        









 Capillary blood glucose measurement by glucometer (mass/volume) - 17 21:
32         









 Capillary blood glucose measurement by glucometer (mass/volume)            126 
mg/dL                    









 Capillary blood glucose measurement by glucometer (mass/volume) - 17 05:
19         









 Capillary blood glucose measurement by glucometer (mass/volume)            130 
mg/dL                    









 Complete blood count (CBC) with automated white blood cell (WBC) differential 
- 17 07:31         









 Blood leukocytes automated count (number/volume)            8.7 10*3/uL       
     4.3-11.0        

 

 Blood erythrocytes automated count (number/volume)            2.91 10*6/uL    
        4.35-5.85        

 

 Venous blood hemoglobin measurement (mass/volume)            8.5 g/dL         
   11.5-16.0        

 

 Blood hematocrit (volume fraction)            27 %            35-52        

 

 Automated erythrocyte mean corpuscular volume            93 [foz_us]          
  80-99        

 

 Automated erythrocyte mean corpuscular hemoglobin (mass per erythrocyte)      
      29 pg            25-34        

 

 Automated erythrocyte mean corpuscular hemoglobin concentration measurement (
mass/volume)            32 g/dL            32-36        

 

 Automated erythrocyte distribution width ratio            15.7 %            
10.0-14.5        

 

 Automated blood platelet count (count/volume)            223 10*3/uL          
  130-400        

 

 Automated blood platelet mean volume measurement            9.7 [foz_us]      
      7.4-10.4        

 

 Automated blood neutrophils/100 leukocytes            69 %            42-75   
     

 

 Automated blood lymphocytes/100 leukocytes            23 %            12-44   
     

 

 Blood monocytes/100 leukocytes            7 %            0-12        

 

 Automated blood eosinophils/100 leukocytes            1 %            0-10     
   

 

 Automated blood basophils/100 leukocytes            0 %            0-10        

 

 Blood neutrophils automated count (number/volume)            6.0 10*3         
   1.8-7.8        

 

 Blood lymphocytes automated count (number/volume)            2.0 10*3         
   1.0-4.0        

 

 Blood monocytes automated count (number/volume)            0.6 10*3            
0.0-1.0        

 

 Automated eosinophil count            0.1 10*3/uL            0.0-0.3        

 

 Automated blood basophil count (count/volume)            0.0 10*3/uL          
  0.0-0.1        









 Whole blood basic metabolic panel - 17 07:31         









 Serum or plasma sodium measurement (moles/volume)            141 mmol/L       
     135-145        

 

 Serum or plasma potassium measurement (moles/volume)            4.2 mmol/L    
        3.6-5.0        

 

 Serum or plasma chloride measurement (moles/volume)            116 mmol/L     
               

 

 Carbon dioxide            18 mmol/L            21-32        

 

 Serum or plasma anion gap determination (moles/volume)            7 mmol/L    
        5-14        

 

 Serum or plasma urea nitrogen measurement (mass/volume)            26 mg/dL   
         7-18        

 

 Serum or plasma creatinine measurement (mass/volume)            1.34 mg/dL    
        0.60-1.30        

 

 Serum or plasma urea nitrogen/creatinine mass ratio            19             
NRG        

 

 Serum or plasma creatinine measurement with calculation of estimated 
glomerular filtration rate            38             NRG        

 

 Serum or plasma glucose measurement (mass/volume)            159 mg/dL        
            

 

 Serum or plasma calcium measurement (mass/volume)            7.7 mg/dL        
    8.5-10.1        









 Capillary blood glucose measurement by glucometer (mass/volume) - 17 10:
55         









 Capillary blood glucose measurement by glucometer (mass/volume)            150 
mg/dL                    









 Capillary blood glucose measurement by glucometer (mass/volume) - 17 16:
19         









 Capillary blood glucose measurement by glucometer (mass/volume)            102 
mg/dL                    









 Capillary blood glucose measurement by glucometer (mass/volume) - 17 20:
21         









 Capillary blood glucose measurement by glucometer (mass/volume)            182 
mg/dL                    









 Capillary blood glucose measurement by glucometer (mass/volume) - 17 05:
12         









 Capillary blood glucose measurement by glucometer (mass/volume)            141 
mg/dL                    









 Capillary blood glucose measurement by glucometer (mass/volume) - 17 10:
43         









 Capillary blood glucose measurement by glucometer (mass/volume)            171 
mg/dL                    









 Capillary blood glucose measurement by glucometer (mass/volume) - 17 15:
58         









 Capillary blood glucose measurement by glucometer (mass/volume)            132 
mg/dL                    









 CBC With Differential/Platelet - 17 09:38         









 WBC            8.0 x10E3/uL            3.4-10.8        

 

 RBC            3.31 x10E6/uL            3.77-5.28        

 

 Hemoglobin            9.0 g/dL            11.1-15.9        

 

 Hematocrit            28.5 %            34.0-46.6        

 

 MCV            86 fL            79-97        

 

 MCH            27.2 pg            26.6-33.0        

 

 MCHC            31.6 g/dL            31.5-35.7        

 

 RDW            15.7 %            12.3-15.4        

 

 Platelets            320 x10E3/uL            150-379        

 

 Neutrophils            52 %                     

 

 Lymphs            32 %                     

 

 Monocytes            8 %                     

 

 Eos            7 %                     

 

 Basos            1 %                     

 

 Neutrophils (Absolute)            4.3 x10E3/uL            1.4-7.0        

 

 Lymphs (Absolute)            2.5 x10E3/uL            0.7-3.1        

 

 Monocytes(Absolute)            0.6 x10E3/uL            0.1-0.9        

 

 Eos (Absolute)            0.6 x10E3/uL            0.0-0.4        

 

 Baso (Absolute)            0.0 x10E3/uL            0.0-0.2        

 

 Immature Granulocytes            0 %                     

 

 Immature Grans (Abs)            0.0 x10E3/uL            0.0-0.1        









 ANEMIA PANEL - 18 10:36         









 IRON, TOTAL            109 mcg/dL                    

 

 FERRITIN            13 ng/mL                    

 

 IRON BINDING CAPACITY            457 mcg/dL (calc)            250-450        

 

 % SATURATION            24 % (calc)            11-50        









 CMP - 18 10:36         









 GLUCOSE            137 mg/dL            65-99        

 

 UREA NITROGEN (BUN)            30 mg/dL            7-25        

 

 CREATININE            1.38 mg/dL            0.60-0.88        

 

 eGFR NON-AFR. AMERICAN            35 mL/min/1.73m2            > OR=60        

 

 eGFR             41 mL/min/1.73m2            > OR=60        

 

 BUN/CREATININE RATIO            22 (calc)            6-22        

 

 SODIUM            138 mmol/L            135-146        

 

 POTASSIUM            4.3 mmol/L            3.5-5.3        

 

 CHLORIDE            107 mmol/L                    

 

 CARBON DIOXIDE            18 mmol/L            20-31        

 

 CALCIUM            9.5 mg/dL            8.6-10.4        

 

 PROTEIN, TOTAL            8.0 g/dL            6.1-8.1        

 

 ALBUMIN            4.3 g/dL            3.6-5.1        

 

 GLOBULIN            3.7 g/dL (calc)            1.9-3.7        

 

 ALBUMIN/GLOBULIN RATIO            1.2 (calc)            1.0-2.5        

 

 BILIRUBIN, TOTAL            0.3 mg/dL            0.2-1.2        

 

 ALKALINE PHOSPHATASE            56 U/L                    

 

 AST            14 U/L            10-35        

 

 ALT            10 U/L            6-29        









 CMP - 18 10:41         









 GLUCOSE            143 mg/dL            65-99        

 

 UREA NITROGEN (BUN)            34 mg/dL            7-25        

 

 CREATININE            1.48 mg/dL            0.60-0.88        

 

 eGFR NON-AFR. AMERICAN            32 mL/min/1.73m2            > OR=60        

 

 eGFR             37 mL/min/1.73m2            > OR=60        

 

 BUN/CREATININE RATIO            23 (calc)            6-22        

 

 SODIUM            137 mmol/L            135-146        

 

 POTASSIUM            4.3 mmol/L            3.5-5.3        

 

 CHLORIDE            106 mmol/L                    

 

 CARBON DIOXIDE            21 mmol/L            20-31        

 

 CALCIUM            9.6 mg/dL            8.6-10.4        

 

 PROTEIN, TOTAL            7.8 g/dL            6.1-8.1        

 

 ALBUMIN            4.3 g/dL            3.6-5.1        

 

 GLOBULIN            3.5 g/dL (calc)            1.9-3.7        

 

 ALBUMIN/GLOBULIN RATIO            1.2 (calc)            1.0-2.5        

 

 BILIRUBIN, TOTAL            0.3 mg/dL            0.2-1.2        

 

 ALKALINE PHOSPHATASE            50 U/L                    

 

 AST            12 U/L            10-35        

 

 ALT            8 U/L            6-29        









 CBC - 18 10:41         









 WHITE BLOOD CELL COUNT            6.2 Thousand/uL            3.8-10.8        

 

 RED BLOOD CELL COUNT            3.73 Million/uL            3.80-5.10        

 

 HEMOGLOBIN            11.5 g/dL            11.7-15.5        

 

 HEMATOCRIT            33.9 %            35.0-45.0        

 

 MCV            90.9 fL            80.0-100.0        

 

 MCH            30.8 pg            27.0-33.0        

 

 MCHC            33.9 g/dL            32.0-36.0        

 

 RDW            13.8 %            11.0-15.0        

 

 PLATELET COUNT            249 Thousand/uL            140-400        

 

 MPV            10.7 fL            7.5-12.5        

 

 ABSOLUTE NEUTROPHILS            3633 cells/uL            8814-1324        

 

 ABSOLUTE LYMPHOCYTES            1854 cells/uL            850-3900        

 

 ABSOLUTE MONOCYTES            403 cells/uL            200-950        

 

 ABSOLUTE EOSINOPHILS            260 cells/uL                    

 

 ABSOLUTE BASOPHILS            50 cells/uL            0-200        

 

 NEUTROPHILS            58.6 %            NRG        

 

 LYMPHOCYTES            29.9 %            NRG        

 

 MONOCYTES            6.5 %            NRG        

 

 EOSINOPHILS            4.2 %            NRG        

 

 BASOPHILS            0.8 %            NRG        









 Complete blood count (CBC) with automated white blood cell (WBC) differential 
- 19 10:55         









 Blood leukocytes automated count (number/volume)            17.6 10*3/uL      
      4.3-11.0        

 

 Blood erythrocytes automated count (number/volume)            3.46 10*6/uL    
        4.35-5.85        

 

 Venous blood hemoglobin measurement (mass/volume)            10.5 g/dL        
    11.5-16.0        

 

 Blood hematocrit (volume fraction)            33 %            35-52        

 

 Automated erythrocyte mean corpuscular volume            96 [foz_us]          
  80-99        

 

 Automated erythrocyte mean corpuscular hemoglobin (mass per erythrocyte)      
      30 pg            25-34        

 

 Automated erythrocyte mean corpuscular hemoglobin concentration measurement (
mass/volume)            32 g/dL            32-36        

 

 Automated erythrocyte distribution width ratio            14.5 %            
10.0-14.5        

 

 Automated blood platelet count (count/volume)            204 10*3/uL          
  130-400        

 

 Automated blood platelet mean volume measurement            11.0 [foz_us]     
       7.4-10.4        

 

 Automated blood neutrophils/100 leukocytes            84 %            42-75   
     

 

 Automated blood lymphocytes/100 leukocytes            9 %            12-44    
    

 

 Blood monocytes/100 leukocytes            7 %            0-12        

 

 Automated blood eosinophils/100 leukocytes            0 %            0-10     
   

 

 Automated blood basophils/100 leukocytes            0 %            0-10        

 

 Blood neutrophils automated count (number/volume)            14.7 10*3        
    1.8-7.8        

 

 Blood lymphocytes automated count (number/volume)            1.6 10*3         
   1.0-4.0        

 

 Blood monocytes automated count (number/volume)            1.3 10*3            
0.0-1.0        

 

 Automated eosinophil count            0.0 10*3/uL            0.0-0.3        

 

 Automated blood basophil count (count/volume)            0.0 10*3/uL          
  0.0-0.1        









 Blood manual differential performed detection - 19 10:55         









 Blood monocytes/100 leukocytes            4 %            NRG        

 

 Manual blood segmented neutrophils/100 leukocytes            89 %            
NRG        

 

 Blood band neutrophils/100 leukocytes            0 %            NRG        

 

 Manual blood lymphocytes/100 leukocytes            7 %            NRG        

 

 Manual eosinophils/100 leukocytes in nose            0 %            NRG        

 

 Manual blood basophils/100 leukocytes            0 %            NRG        

 

 Blood erythrocyte morphology finding identification            NORMAL         
    NR        









 Comprehensive metabolic panel - 19 10:55         









 Serum or plasma sodium measurement (moles/volume)            138 mmol/L       
     135-145        

 

 Serum or plasma potassium measurement (moles/volume)            4.4 mmol/L    
        3.6-5.0        

 

 Serum or plasma chloride measurement (moles/volume)            110 mmol/L     
               

 

 Carbon dioxide            16 mmol/L            21-32        

 

 Serum or plasma anion gap determination (moles/volume)            12 mmol/L   
         5-14        

 

 Serum or plasma urea nitrogen measurement (mass/volume)            31 mg/dL   
         7-18        

 

 Serum or plasma creatinine measurement (mass/volume)            1.56 mg/dL    
        0.60-1.30        

 

 Serum or plasma urea nitrogen/creatinine mass ratio            20             
NRG        

 

 Serum or plasma creatinine measurement with calculation of estimated 
glomerular filtration rate            32             NRG        

 

 Serum or plasma glucose measurement (mass/volume)            167 mg/dL        
            

 

 Serum or plasma calcium measurement (mass/volume)            9.5 mg/dL        
    8.5-10.1        

 

 Serum or plasma total bilirubin measurement (mass/volume)            0.4 mg/dL
            0.1-1.0        

 

 Serum or plasma alkaline phosphatase measurement (enzymatic activity/volume)  
          50 U/L                    

 

 Serum or plasma aspartate aminotransferase measurement (enzymatic activity/
volume)            11 U/L            5-34        

 

 Serum or plasma alanine aminotransferase measurement (enzymatic activity/volume
)            9 U/L            0-55        

 

 Serum or plasma protein measurement (mass/volume)            7.7 g/dL         
   6.4-8.2        

 

 Serum or plasma albumin measurement (mass/volume)            3.9 g/dL         
   3.2-4.5        

 

 CALCIUM CORRECTED            9.6 mg/dL            8.5-10.1        



                                                                    



Encounters

      





 ACCT No.            Visit Date/Time            Discharge            Status    
        Pt. Type            Provider            Facility            Loc./Unit  
          Complaint        

 

 [id number]            2019 08:58:00            2019 11:39:00    
        DIS            Outpatient            MIGNON MULTANI, ADITYA RUIZ            [facility] 
[facility]            ER            LT LEG INJURY        

 

 [id number]            2019 06:35:00            2019 08:00:00    
        DIS            Outpatient            ZENA MULTANI, SHIN NICHOLE            [facility] 
[facility]            ER            FELL OUT OF WHEELCHAIR   
     

 

 [id number]            2017 07:00:00            2017 17:15:00    
        DIS            Inpatient            INDIA MULTANI, SARA GRAJEDA            [facility] 
[facility]            [address]TH            DIARRHEA,VOLUME 
DELPETION        

 

 [id number]            2015 19:37:00            2015 20:35:00    
        DIS            Emergency            FAISAL MILLER            [facility] 
[facility]            ER            COUGH/CONGESTION        

 

 [id number]            2015 17:29:00            2015 19:34:00    
        DIS            Emergency            FAISAL MILLER            [facility] 
[facility]            ER            VOMITING,WEAKNESS        

 

 [id number]            2015 19:37:00                                   
   Document Registration                                                       
     

 

 [id number]            2015 19:37:00                                   
   Document Registration                                                       
     

 

 [id number]            2012 13:03:00                                   
   Document Registration                                                       
     

 

 [id number]            2010 15:21:00                                   
   Document Registration                                                       
     

 

 [id number]            03/10/2010 12:33:00                                   
   Document Registration                                                       
     

 

 [id number]            2017 00:05:00                                   
   Document Registration                                                       
     

 

 [id number]            2018 12:20:00            2018 23:59:59          
  CLS            Outpatient            JUAN CARLOS MULTANI, YULISA                       
  [facility]                     

 

 [id number]            2018 09:00:00                                      
Document Registration                                                          
  

 

 [id number]            2018 10:00:00                                      
Document Registration                                                          
  

 

 [id number]            07/15/2017 08:06:00                                   
   Document Registration

## 2019-02-15 NOTE — NUR
Accompanied pt's , Dionisio and woman (caregiver) who referred to the pt and Dionisio as "mom 
and dad". The caregiver raised her voice, stating she wanted clarification about how severe 
the pt's condition was. She also said a  was going to follow up with them about 
Dionisio staying with the pt in her hospital room, but that Dionisio had Alzheimer's and needed care 
also. During this visit, staff confirmed Dionisio could not stay with the pt. This was accepted 
graciously. 



As we spoke further, the caregiver shared "I'm not actually their daughter", but became 
close to the couple and now helps care for them. During this encounter, she was concerned 
that her boyfriend was going to leave her for opting to stay with the pt and Dionisio. She said 
the boyfriend's texts indicated he was angry. Offered active listening and compassionate 
presence. Caregiver said she was safe, just upset.

## 2019-02-15 NOTE — DIAGNOSTIC IMAGING REPORT
INDICATION: Central line placement.



COMPARISON: 03/21/2017.



FINDINGS: Single frontal radiographic view of the chest was

obtained and demonstrates left-sided subclavian central venous

catheter, tip of which terminates in the SVC. Cardiac silhouette

and pulmonary vasculature are within normal limits. Lungs are

clear. There is no focal consolidation, large effusion, or

pneumothorax. Bony structures show no gross acute abnormalities.



IMPRESSION:

1. Left-sided subclavian central venous catheter with tip in the

SVC.

2. No acute cardiopulmonary process.



Dictated by: 



  Dictated on workstation # [id number]

## 2019-02-15 NOTE — DIAGNOSTIC IMAGING REPORT
INDICATION: 

Left leg wound. This study is performed to evaluate for an

abscess.



TECHNIQUE: 

Sonographic interrogation of the area of wound was performed in

the medial lower calf region.



FINDINGS:

There is an ill-defined region of heterogeneous hypoechogenicity

deep to the skin surface measuring 4.6 x 1.5 x 3.0 cm. No

internal vascularity is seen. This is suggestive of an

ill-defined region of infection or early abscess formation. No

other abnormalities are seen.



IMPRESSION: 

Ill-defined complex fluid at the area of palpable abnormality in

the medial calf is suggestive of early abscess formation.



Dictated by: 



  Dictated on workstation # [id number]

## 2019-02-15 NOTE — NUR
Pastoral care request for pt visit, I contacted pt and offered support and prayer, she was 
not aware of visit request by he family but appreciated it.

[facility] 
Advance Directive paperwork. I turned this over to felipe Sales who completed A.D.

## 2019-02-15 NOTE — ED LOWER EXTREMITY
General


Chief Complaint:  Lower Extremity


Stated Complaint:  LT LEG WOUND CHECK; NAUSEA


Nursing Triage Note:  


SEEN HERE ON MONDAY FOR REDNESS AND PAIN IN LEFT LEG.  PUT ON ABX AND SENT 

HOME. 


 PT STATES HER LEG IS NOT WORSE BUT NOT GETTING BETTER.  ALSO COMPLAINS OF 


NAUSEA.


Nursing Sepsis Screen:  No Definite Risk


Source:  patient


Exam Limitations:  no limitations





History of Present Illness


Date Seen by Provider:  Feb 15, 2019


Time Seen by Provider:  10:56


Initial Comments


To ER With reports of left lower extremity redness and pain. She was seen here 

on Monday, had labs drawn, negative venous ultrasound. Started on Bactrim and 

sent home. Comes back today with minimal improvement in the redness and pain, 

reports persistent chills.


Onset:  other (5 days ago)


Severity:  moderate


Pain/Injury Location:  left leg


Method of Injury:  fell


Modifying Factors:  Worse With Movement





Allergies and Home Medications


Allergies


Coded Allergies:  


     diazepam (Unverified  Allergy, Intermediate, RASH, 11/19/10)





Home Medications


Alprazolam 0.5 Mg Tablet, 0.5 MG PO TID, (Reported)


Aspirin 81 Mg Tablet.dr, 81 MG PO DAILY, (Reported)


Esomeprazole Magnesium 40 Mg Cap, 40 MG PO DAILY, (Reported)


Lisinopril 5 Mg Tablet, 5 MG PO DAILY, (Reported)


Metformin HCl 500 Mg Tablet, 500 MG PO BID WITH MEALS, (Reported)


Metoprolol Tartrate 25 Mg Tablet, 25 MG PO BID, (Reported)


Tramadol HCl/Acetaminophen 1 Each Tablet, 1 TAB PO QID, (Reported)





Patient Home Medication List


Home Medication List Reviewed:  Yes





Review of Systems


Constitutional:  see HPI


EENTM:  see HPI


Respiratory:  no symptoms reported


Cardiovascular:  no symptoms reported


Genitourinary:  no symptoms reported


Musculoskeletal:  see HPI


Skin:  see HPI


Psychiatric/Neurological:  No Symptoms Reported





Past Medical-Social-Family Hx


Patient Social History


Alcohol Use:  Denies Use


Recreational Drug Use:  No


Smoking Status:  Never a Smoker


2nd Hand Smoke Exposure:  No


Recent Foreign Travel:  No


Contact w/Someone Who Travel:  No


Recent Infectious Disease Expo:  No


Recent Hopitalizations:  No





Immunizations Up To Date


Tetanus Booster (TDap):  Unknown


Date of Pneumonia Vaccine:  Oct 22, 2017





Seasonal Allergies


Seasonal Allergies:  No





Past Medical History


Surgeries:  Yes (SKIN CA REMOVAL)


Eye Surgery, Hysterectomy


Respiratory:  No


Cardiac:  Yes


Hypertension


Neurological:  No


GYN History:  Hysterectomy


Genitourinary:  No


Gastrointestinal:  Yes


Gastroesophageal Reflux


Musculoskeletal:  No


Endocrine:  Yes


Diabetes, Non-Insulin dep


Cataract, Glaucoma


Cancer:  Yes


Skin


Psychosocial:  No


Integumentary:  No


Blood Disorders:  No


Adverse Reaction/Blood Tranf:  No





Family Medical History





Patient reports no known family medical history.


No Pertinent Family Hx





Physical Exam


Vital Signs





Vital Signs - First Documented








 2/15/19





 10:05


 


Temp 97.5


 


Pulse 81


 


Resp 16


 


B/P (MAP) 137/46 (76)


 


Pulse Ox 97


 


O2 Delivery Room Air





Capillary Refill : Less Than 3 Seconds


Height, Weight, BMI


Height: 5'5.00"


Weight: 160lbs. 0oz. [id number]; 28.8 BMI


Method:Stated


General Appearance:  WD/WN, no apparent distress


HEENT:  PERRL/EOMI, normal ENT inspection


Respiratory:  no respiratory distress, no accessory muscle use


Hips:  bilateral hip non-tender, bilateral hip normal inspection, bilateral hip 

normal range of motion


Legs:  left leg other (left lower extremity is red. There is a palpable 

fluctuant or at least very soft nodule to the medial lower leg half-dollar 

sized. We will obtain soft tissue ultrasound to ensure this is not a fluid 

collection such as abscess. She states this nodule just popped up on Monday. 

There is no draining wound.)


Knees:  bilateral knee non-tender, bilateral knee normal inspection, bilateral 

knee normal range of motion


Ankles:  bilateral ankle non-tender, bilateral ankle normal inspection, 

bilateral ankle normal range of motion


Feet:  left foot other (she does have diabetic ulcer to the plantar surface of 

the left great toe.)


Neurologic/Psychiatric:  alert, normal mood/affect, oriented x 3


Skin:  normal color, warm/dry





Procedures/Interventions


I&D :  


   Blade Size:  11


Progress


Ultrasound did reveal evidence of a fluid collection in the lower extremity. We 

anesthetized this with 1 mL of 2% lidocaine without epinephrine. An 18-gauge 

needle was inserted into this cavity and purulent material was aspirated. We 

then took an 11 blade scalpel made a one centimeters incision over the most 

fluctuant portion of this abscess and open this and bluntly dissected with 

curved hemostats. Large amount of purulent material was expressed. Culture 

collected and sent to lab. Wound was then irrigated with chlorhexidine/saline 

solution and packed with 1/4 inch iodoform gauze.





Progress/Results/Core Measures


Results/Orders


Lab Results





Laboratory Tests








Test


 2/15/19


11:00 2/15/19


12:05 Range/Units


 


 


White Blood Count


 10.8 


 


 4.3-11.0


10^3/uL


 


Red Blood Count


 3.54 L


 


 4.35-5.85


10^6/uL


 


Hemoglobin 10.8 L  11.5-16.0  G/DL


 


Hematocrit 34 L  35-52  %


 


Mean Corpuscular Volume 97   80-99  FL


 


Mean Corpuscular Hemoglobin 31   25-34  PG


 


Mean Corpuscular Hemoglobin


Concent 32 


 


 32-36  G/DL





 


Red Cell Distribution Width 14.0   10.0-14.5  %


 


Platelet Count


 341 


 


 130-400


10^3/uL


 


Mean Platelet Volume 10.1   7.4-10.4  FL


 


Neutrophils (%) (Auto) 83 H  42-75  %


 


Lymphocytes (%) (Auto) 12   12-44  %


 


Monocytes (%) (Auto) 4   0-12  %


 


Eosinophils (%) (Auto) 1   0-10  %


 


Basophils (%) (Auto) 0   0-10  %


 


Neutrophils # (Auto) 9.0 H  1.8-7.8  X 10^3


 


Lymphocytes # (Auto) 1.3   1.0-4.0  X 10^3


 


Monocytes # (Auto) 0.5   0.0-1.0  X 10^3


 


Eosinophils # (Auto)


 0.1 


 


 0.0-0.3


10^3/uL


 


Basophils # (Auto)


 0.0 


 


 0.0-0.1


10^3/uL


 


Sodium Level 135   135-145  MMOL/L


 


Potassium Level 6.8 *H 7.0 *H 3.6-5.0  MMOL/L


 


Chloride Level 111 H    MMOL/L


 


Carbon Dioxide Level 12 L  21-32  MMOL/L


 


Anion Gap 12   5-14  MMOL/L


 


Blood Urea Nitrogen 41 H  7-18  MG/DL


 


Creatinine


 1.83 H


 


 0.60-1.30


MG/DL


 


Estimat Glomerular Filtration


Rate 26 


 


  





 


BUN/Creatinine Ratio 22    


 


Glucose Level 152 H    MG/DL


 


Lactic Acid Level


 1.37 


 


 0.50-2.00


MMOL/L


 


Calcium Level 10.0   8.5-10.1  MG/DL


 


Corrected Calcium 9.8   8.5-10.1  MG/DL


 


Total Bilirubin 0.2   0.1-1.0  MG/DL


 


Aspartate Amino Transf


(AST/SGOT) 20 


 


 5-34  U/L





 


Alanine Aminotransferase


(ALT/SGPT) 24 


 


 0-55  U/L





 


Alkaline Phosphatase 82     U/L


 


Total Protein 8.9 H  6.4-8.2  GM/DL


 


Albumin 4.2   3.2-4.5  GM/DL








My Orders





Orders - FAISAL MILLER


Cbc With Automated Diff (2/15/19 10:40)


Comprehensive Metabolic Panel (2/15/19 10:40)


Iv Heplock-Insert (Order) (2/15/19 10:51)


Us Soft Tissue Unlisted [id number] (2/15/19 10:51)


Blood Culture (2/15/19 10:51)


Lactic Acid Analyzer (2/15/19 10:51)


Ceftriaxone  For Iv Use (Rocephin  For I (2/15/19 11:15)


Potassium (2/15/19 11:49)


Insulin (Regular) Human (Humulin R (Per (2/15/19 16:00)


D50w (Emergency) Syringe (Dextrose 50% 5 (2/15/19 12:45)


Ns Iv 1000 Ml (Sodium Chloride 0.9%) (2/15/19 12:45)


Sodium Polystyrene Sulfonate (Kayexalate (2/15/19 12:45)


Ekg Tracing (2/15/19 12:39)


Continuous Ekg Monitoring (2/15/19 12:39)


Insulin (Regular) Human (Humulin R (Per (2/15/19 13:00)





Medications Given in ED





Current Medications








 Medications  Dose


 Ordered  Sig/Fred


 Route  Start Time


 Stop Time Status Last Admin


Dose Admin


 


 Ceftriaxone


 Sodium 1000 mg/


 Sterile Water  10 ml @ 


 200 mls/hr  ONCE  ONCE


 IV  2/15/19 11:15


 2/15/19 11:17 DC 2/15/19 12:11


200 MLS/HR


 


 Dextrose  25 ml  ONCE  ONCE


 IV  2/15/19 12:45


 2/15/19 12:46 DC 2/15/19 13:07


25 ML


 


 Insulin Human


 Regular  10 unit  ONCE  ONCE


 IV  2/15/19 13:00


 2/15/19 13:01 DC 2/15/19 13:04


10 UNIT








Vital Signs/I&O











 2/15/19





 10:05


 


Temp 97.5


 


Pulse 81


 


Resp 16


 


B/P (MAP) 137/46 (76)


 


Pulse Ox 97


 


O2 Delivery Room Air














Blood Pressure Mean:  76











Progress


Progress Note :  


Progress Note


NAME:   ARIANA BEJARANO


[facility] REC#:   [id number]


ACCOUNT#:   [id number]


PT STATUS:   REG ER


:   1934


PHYSICIAN:   FAISAL MILLER


ADMIT DATE:   02/15/19/ER


 ***Draft***


Date of Exam:02/15/19





US SOFT TISSUE UNLISTED [id number]








INDICATION: 


Left leg wound. This study is performed to evaluate for an


abscess.





TECHNIQUE: 


Sonographic interrogation of the area of wound was performed in


the medial lower calf region.





FINDINGS:


There is an ill-defined region of heterogeneous hypoechogenicity


deep to the skin surface measuring 4.6 x 1.5 x 3.0 cm. No


internal vascularity is seen. This is suggestive of an


ill-defined region of infection or early abscess formation. No


other abnormalities are seen.





IMPRESSION: 


Ill-defined complex fluid at the area of palpable abnormality in


the medial calf is suggestive of early abscess formation.





  Dictated on workstation # [id number]








Dict:   02/15/19 [id number]


Trans:   02/15/19 1207


 [id number]





Interpreted by:     SANDHYA DON MD


Electronically signed by:





Diagnostic Imaging





   Diagonstic Imaging:  Ultrasound





Departure


Communication (Admissions)


Time/Spoke to Admitting Phy:  12:19


Spoke with Dr. Watson, we will admit, consult surgery, Zosyn and vancomycin for 

antibiotics.


Time/Spoke to Consulting Phy:  12:20


I relayed the message for Dr. Saldana to consult via [place] in OR.


Given her history of diabetes, failure to improve with 1 week of outpatient 

Bactrim, abscess finding on ultrasound with evidence of associated cellulitis, 

she warrants inpatient admission for IV antibiotics. She does have a social 

issue at hand as her  is demented and requires medications and PEG tube 

feedings. She only has a 24-gauge IV in the volar left wrist. She refuses to 

allow an external jugular IV to be placed.





Impression





 Primary Impression:  


 Cellulitis, leg


 Qualified Codes:  L03.116 - Cellulitis of left lower limb


 Additional Impressions:  


 Chronic renal disease


 Qualified Codes:  N18.9 - Chronic kidney disease, unspecified


 Hyperkalemia


Disposition:   ADMITTED AS INPATIENT


Condition:  Stable





Admissions


Decision to Admit Reason:  Admit from ER (General)


Decision to Admit/Date:  Feb 15, 2019


Time/Decision to Admit Time:  12:04





Departure-Patient Inst.


Referrals:  


[facility] (PCP/Family)


Primary Care Physician











FAISAL MILLER Feb 15, 2019 10:59

## 2019-02-16 VITALS — DIASTOLIC BLOOD PRESSURE: 63 MMHG | SYSTOLIC BLOOD PRESSURE: 125 MMHG

## 2019-02-16 VITALS — DIASTOLIC BLOOD PRESSURE: 37 MMHG | SYSTOLIC BLOOD PRESSURE: 126 MMHG

## 2019-02-16 VITALS — SYSTOLIC BLOOD PRESSURE: 99 MMHG | DIASTOLIC BLOOD PRESSURE: 37 MMHG

## 2019-02-16 VITALS — DIASTOLIC BLOOD PRESSURE: 64 MMHG | SYSTOLIC BLOOD PRESSURE: 133 MMHG

## 2019-02-16 VITALS — SYSTOLIC BLOOD PRESSURE: 120 MMHG | DIASTOLIC BLOOD PRESSURE: 57 MMHG

## 2019-02-16 VITALS — SYSTOLIC BLOOD PRESSURE: 134 MMHG | DIASTOLIC BLOOD PRESSURE: 59 MMHG

## 2019-02-16 VITALS — SYSTOLIC BLOOD PRESSURE: 119 MMHG | DIASTOLIC BLOOD PRESSURE: 43 MMHG

## 2019-02-16 VITALS — SYSTOLIC BLOOD PRESSURE: 118 MMHG | DIASTOLIC BLOOD PRESSURE: 46 MMHG

## 2019-02-16 VITALS — SYSTOLIC BLOOD PRESSURE: 149 MMHG | DIASTOLIC BLOOD PRESSURE: 72 MMHG

## 2019-02-16 VITALS — DIASTOLIC BLOOD PRESSURE: 58 MMHG | SYSTOLIC BLOOD PRESSURE: 134 MMHG

## 2019-02-16 VITALS — DIASTOLIC BLOOD PRESSURE: 57 MMHG | SYSTOLIC BLOOD PRESSURE: 136 MMHG

## 2019-02-16 VITALS — SYSTOLIC BLOOD PRESSURE: 134 MMHG | DIASTOLIC BLOOD PRESSURE: 58 MMHG

## 2019-02-16 VITALS — DIASTOLIC BLOOD PRESSURE: 57 MMHG | SYSTOLIC BLOOD PRESSURE: 100 MMHG

## 2019-02-16 VITALS — SYSTOLIC BLOOD PRESSURE: 124 MMHG | DIASTOLIC BLOOD PRESSURE: 49 MMHG

## 2019-02-16 VITALS — DIASTOLIC BLOOD PRESSURE: 62 MMHG | SYSTOLIC BLOOD PRESSURE: 124 MMHG

## 2019-02-16 VITALS — SYSTOLIC BLOOD PRESSURE: 127 MMHG | DIASTOLIC BLOOD PRESSURE: 48 MMHG

## 2019-02-16 LAB
BASOPHILS # BLD AUTO: 0 10^3/UL (ref 0–0.1)
BASOPHILS NFR BLD AUTO: 0 % (ref 0–10)
BUN/CREAT SERPL: 22
CALCIUM SERPL-MCNC: 8.5 MG/DL (ref 8.5–10.1)
CHLORIDE SERPL-SCNC: 115 MMOL/L (ref 98–107)
CO2 SERPL-SCNC: 15 MMOL/L (ref 21–32)
CREAT SERPL-MCNC: 1.42 MG/DL (ref 0.6–1.3)
EOSINOPHIL # BLD AUTO: 0.1 10^3/UL (ref 0–0.3)
EOSINOPHIL NFR BLD AUTO: 1 % (ref 0–10)
ERYTHROCYTE [DISTWIDTH] IN BLOOD BY AUTOMATED COUNT: 13.9 % (ref 10–14.5)
GFR SERPLBLD BASED ON 1.73 SQ M-ARVRAT: 35 ML/MIN
GLUCOSE SERPL-MCNC: 167 MG/DL (ref 70–105)
HCT VFR BLD CALC: 27 % (ref 35–52)
HGB BLD-MCNC: 8.9 G/DL (ref 11.5–16)
LYMPHOCYTES # BLD AUTO: 1.9 X 10^3 (ref 1–4)
LYMPHOCYTES NFR BLD AUTO: 23 % (ref 12–44)
MAGNESIUM SERPL-MCNC: 1.5 MG/DL (ref 1.8–2.4)
MANUAL DIFFERENTIAL PERFORMED BLD QL: NO
MCH RBC QN AUTO: 32 PG (ref 25–34)
MCHC RBC AUTO-ENTMCNC: 33 G/DL (ref 32–36)
MCV RBC AUTO: 96 FL (ref 80–99)
MONOCYTES # BLD AUTO: 0.7 X 10^3 (ref 0–1)
MONOCYTES NFR BLD AUTO: 8 % (ref 0–12)
NEUTROPHILS # BLD AUTO: 5.7 X 10^3 (ref 1.8–7.8)
NEUTROPHILS NFR BLD AUTO: 68 % (ref 42–75)
PHOSPHATE SERPL-MCNC: 3.4 MG/DL (ref 2.3–4.7)
PLATELET # BLD: 322 10^3/UL (ref 130–400)
PMV BLD AUTO: 9.9 FL (ref 7.4–10.4)
POTASSIUM SERPL-SCNC: 4.5 MMOL/L (ref 3.6–5)
SODIUM SERPL-SCNC: 141 MMOL/L (ref 135–145)
WBC # BLD AUTO: 8.4 10^3/UL (ref 4.3–11)

## 2019-02-16 RX ADMIN — VANCOMYCIN HYDROCHLORIDE SCH MLS/HR: 500 INJECTION, POWDER, LYOPHILIZED, FOR SOLUTION INTRAVENOUS at 19:21

## 2019-02-16 RX ADMIN — SODIUM CHLORIDE SCH MLS/HR: 4.5 INJECTION, SOLUTION INTRAVENOUS at 14:11

## 2019-02-16 RX ADMIN — ANTACID TABLETS PRN MG: 500 TABLET, CHEWABLE ORAL at 21:18

## 2019-02-16 RX ADMIN — ACETAMINOPHEN PRN MG: 500 TABLET ORAL at 22:44

## 2019-02-16 RX ADMIN — ALPRAZOLAM PRN MG: 0.25 TABLET ORAL at 22:44

## 2019-02-16 RX ADMIN — SODIUM CHLORIDE SCH MLS/HR: 900 INJECTION, SOLUTION INTRAVENOUS at 01:23

## 2019-02-16 RX ADMIN — ANTACID TABLETS PRN MG: 500 TABLET, CHEWABLE ORAL at 18:59

## 2019-02-16 RX ADMIN — SODIUM CHLORIDE SCH MLS/HR: 900 INJECTION, SOLUTION INTRAVENOUS at 21:18

## 2019-02-16 RX ADMIN — POLYETHYLENE GLYCOL (3350) SCH GM: 17 POWDER, FOR SOLUTION ORAL at 09:20

## 2019-02-16 RX ADMIN — SODIUM CHLORIDE SCH MLS/HR: 4.5 INJECTION, SOLUTION INTRAVENOUS at 04:22

## 2019-02-16 RX ADMIN — SODIUM CHLORIDE SCH MLS/HR: 900 INJECTION, SOLUTION INTRAVENOUS at 14:12

## 2019-02-16 RX ADMIN — SODIUM CHLORIDE SCH MLS/HR: 900 INJECTION, SOLUTION INTRAVENOUS at 05:24

## 2019-02-16 RX ADMIN — INSULIN ASPART SCH UNIT: 100 INJECTION, SOLUTION INTRAVENOUS; SUBCUTANEOUS at 13:38

## 2019-02-16 RX ADMIN — INSULIN ASPART SCH UNIT: 100 INJECTION, SOLUTION INTRAVENOUS; SUBCUTANEOUS at 05:13

## 2019-02-16 RX ADMIN — INSULIN ASPART SCH UNIT: 100 INJECTION, SOLUTION INTRAVENOUS; SUBCUTANEOUS at 21:16

## 2019-02-16 RX ADMIN — SODIUM CHLORIDE SCH MLS/HR: 4.5 INJECTION, SOLUTION INTRAVENOUS at 21:21

## 2019-02-16 RX ADMIN — INSULIN ASPART SCH UNIT: 100 INJECTION, SOLUTION INTRAVENOUS; SUBCUTANEOUS at 16:00

## 2019-02-16 RX ADMIN — MAGNESIUM SULFATE IN DEXTROSE SCH MLS/HR: 10 INJECTION, SOLUTION INTRAVENOUS at 04:35

## 2019-02-16 RX ADMIN — MAGNESIUM SULFATE IN DEXTROSE SCH MLS/HR: 10 INJECTION, SOLUTION INTRAVENOUS at 05:23

## 2019-02-16 RX ADMIN — POLYETHYLENE GLYCOL (3350) SCH GM: 17 POWDER, FOR SOLUTION ORAL at 21:16

## 2019-02-16 NOTE — PROGRESS NOTE (SOAP)
Subjective


Date Seen by a Provider:  2019


Time Seen by a Provider:  10:55


Subjective/Events-last exam


Patient seen with Dr. Saldana.  Patient reports doing well.  Does report continued 

pain/discomfort of the left lower extremity wound.  Denies any sweats/chills.  

No N/V.  Tolerating diet.





Focused Exam


Lactate Level


2/15/19 11:00: Lactic Acid Level 1.37





Objective


Exam





Vital Signs








  Date Time  Temp Pulse Resp B/P (MAP) Pulse Ox O2 Delivery O2 Flow Rate FiO2


 


19 10:26  96 12 125/63 (83) 99 Room Air  


 


19 09:00  105 18 124/62 (82) 97 Room Air  


 


19 08:02  93 23 134/58 (83) 100 Room Air  


 


19 08:01     100 Room Air  


 


19 08:00  82 19 134/58 (83) 100 Room Air  


 


19 07:44  82      


 


19 07:00 98.5 89 19 149/72 (97) 99   


 


19 06:00  95 12 100/57 (71) 98 Room Air  


 


19 05:00  95 12 124/49 (74) 98 Room Air  


 


19 04:00  98 19 119/43 (68) 98 Room Air  


 


19 03:35 98.6     Room Air  


 


19 03:35     100 Room Air  


 


19 03:00  93 17 127/48 (74) 95 Room Air  


 


19 02:05      Room Air  


 


19 02:00  104 19 126/37 (66) 99 Nasal Cannula 2.00 


 


19 01:00  89      


 


19 01:00  86 16 99/37 (57) 98 Nasal Cannula 2.00 


 


19 00:50      Nasal Cannula 2.00 


 


19 00:00  96 12 120/57 (78) 99 Room Air  


 


2/15/19 23:50 98.1       


 


2/15/19 23:40     96 Room Air  


 


2/15/19 23:00  100 14 131/53 (79) 100 Room Air  


 


2/15/19 22:00  101 16 137/60 (85) 95 Room Air  


 


2/15/19 21:00  112 15 140/43 (75) 95 Room Air  


 


2/15/19 20:00  105 19 126/45 (72) 96 Room Air  


 


2/15/19 19:45 99.2 106 20 137/49 (78) 97 Room Air  


 


2/15/19 19:30     97 Room Air  


 


2/15/19 19:00  113      


 


2/15/19 19:00  112 17 136/45 (75) 95 Room Air  


 


2/15/19 18:00  111 26 143/52 (82) 99 Room Air  


 


2/15/19 17:00  115 13 151/53 (85) 100 Room Air  


 


2/15/19 16:45  111 15 145/51 (82) 100 Room Air  


 


2/15/19 16:30  112 15 145/57 (86) 100 Room Air  


 


2/15/19 16:15  109 19 149/55 (86) 100 Room Air  


 


2/15/19 16:15  109 19 149/55 (86) 100 Room Air  


 


2/15/19 16:00  110 13 131/88 (102) 99 Room Air  


 


2/15/19 16:00  110 13 131/88 (102) 99 Room Air  


 


2/15/19 15:59  111      


 


2/15/19 15:45 97.4 107 18 131/34 (66) 100 Room Air  


 


2/15/19 15:45 97.4 107 18 131/66 (87) 100 Room Air  


 


2/15/19 15:15  80 16 156/75 (102) 96 Room Air  














I & O 


 


 19





 07:00


 


Intake Total 3430 ml


 


Output Total 1750 ml


 


Balance 1680 ml





Capillary Refill : Less Than 3 Seconds


General Appearance:  No Apparent Distress, WD/WN


Neck:  Normal Inspection, Non Tender, Supple


Respiratory:  Lungs Clear, Normal Breath Sounds, No Accessory Muscle Use, No 

Respiratory Distress


Cardiovascular:  Regular Rate, Rhythm, No Edema


Gastrointestinal:  normal bowel sounds, non tender, soft


Extremity:  Normal Range of Motion, No Pedal Edema, Other (Left lower extremity 

wound with packing in place.  Mild redness/erythema of the left lower extremity 

mostly on the medial half the lower extremity.  Pain with palpation.)


Neurologic/Psychiatric:  Alert, Oriented x3


Skin:  Normal Color, Warm/Dry





Results


Lab


Laboratory Tests


2/15/19 12:05: Potassium Level 7.0*H


2/15/19 14:46: Glucometer 77


2/15/19 17:49: 


Potassium Level 4.9, Sodium Level 138, Chloride Level 117H, Carbon Dioxide 

Level 11L, Anion Gap 10, Blood Urea Nitrogen 38H, Creatinine 1.63H, Estimat 

Glomerular Filtration Rate 30, BUN/Creatinine Ratio 23, Glucose Level 177H, 

Calcium Level 9.0


2/15/19 21:31: Glucometer 180H


19 03:30: 


White Blood Count 8.4, Red Blood Count 2.82L, Hemoglobin 8.9L, Hematocrit 27L, 

Mean Corpuscular Volume 96, Mean Corpuscular Hemoglobin 32, Mean Corpuscular 

Hemoglobin Concent 33, Red Cell Distribution Width 13.9, Platelet Count 322, 

Mean Platelet Volume 9.9, Neutrophils (%) (Auto) 68, Lymphocytes (%) (Auto) 23, 

Monocytes (%) (Auto) 8, Eosinophils (%) (Auto) 1, Basophils (%) (Auto) 0, 

Neutrophils # (Auto) 5.7, Lymphocytes # (Auto) 1.9, Monocytes # (Auto) 0.7, 

Eosinophils # (Auto) 0.1, Basophils # (Auto) 0.0, Sodium Level 141, Potassium 

Level 4.5, Chloride Level 115H, Carbon Dioxide Level 15L, Anion Gap 11, Blood 

Urea Nitrogen 31H, Creatinine 1.42H, Estimat Glomerular Filtration Rate 35, BUN/

Creatinine Ratio 22, Glucose Level 167H, Calcium Level 8.5, Phosphorus Level 3.4

, Magnesium Level 1.5L





Microbiology


2/15/19 Blood Culture - Preliminary, Resulted


          No growth


2/15/19 Gram Stain - Final, Resulted


          


2/15/19 Wound Culture, Resulted


          Pending





Assessment/Plan


Assessment/Plan


Assess & Plan/Chief Complaint


An 84-year-old female with cellulitis and early abscess status post incision 

and drainage of the left lower extremity. 


Continue with medical management with IV abx, pain and nausea medication.


Dressing changes BID.


Wound cultures pending.


Will continue to monitor progress.





Clinical Quality Measures


DVT/VTE Risk/Contraindication:


Risk Factor Score Per Nursin


RFS Level Per Nursing on Admit:  4+=Very High











ABBEY MCGUIRE APRN 2019 11:07

## 2019-02-16 NOTE — OPERATIVE REPORT
DATE OF SERVICE:  02/15/2019



ATTENDING PRIMARY CARE PHYSICIAN:

[facility].



PREOPERATIVE DIAGNOSES:

Right lower extremity cellulitis with abscess with poor peripheral venous

circulation.



POSTOPERATIVE DIAGNOSIS:

Right lower extremity cellulitis with abscess with poor peripheral venous

circulation.



PROCEDURE:

Placement of left subclavian central venous catheter.



SURGEON:

Emily Boudreaux MD



ANESTHESIA:

Local.



ESTIMATED BLOOD LOSS:

Minimal.



DISPOSITION:

The patient tolerated the procedure well.



INDICATIONS:

The patient is an 84-year-old female who was seen approximately 5 days ago with

pain and swelling in the right anterolateral shin.  She reported that she had

fell one week previous; however, did not notice any breach in her skin.  The

redness developed as well as pain and only cellulitis was identified and she was

started on Bactrim.  She returns today with increasing pain, swelling.  An

ultrasound also confirmed an early abscess.  This was drained by the Emergency

Department staff.  Multiple attempts were made at a peripheral as well as a PICC

line; however, unsuccessful.  We will proceed with placement of a central venous

catheter.



DESCRIPTION OF PROCEDURE:

The neck and chest were prepped and draped in standard surgical fashion.  A 0.5%

Marcaine with epinephrine was used to anesthetize the overlying skin in the left

subclavian region.  The left subclavian vein was then cannulated with drawing of

venous blood.  A guidewire was then inserted without any resistance.  A skin

incision was made using an 11 blade and a venous dilator was used to create a

tract.  A triple lumen central venous catheter was then placed over the

guidewire using the Seldinger technique.  The guidewire was removed and all

three ports mireya venous blood and saline pushed in without any resistance.

 The catheter was then sutured to the skin using 3-0 silk sutures.  Catheter was

then cleaned and covered with Op-Site.



The patient tolerated the procedure well.  We will get a post-procedure chest

x-ray once confirmation placed.  The catheter may be used at any time.





Job ID: [id number]

DocumentID: [id number]

Dictated Date:  02/15/2019 16:20:41

Transcription Date: 02/16/2019 03:47:35

Dictated By: EMILY BOUDREAUX MD

[facility]

## 2019-02-16 NOTE — PULMONARY CONSULTATION
History of Present Illness


History of Present Illness


Date of Consultation


2/16/19


 06:14


Time Seen by Provider:  06:27


Date of Admission





History of Present Illness


85yo presented to ED secondary to worsening pain and swelling in the LLE. Onset 

was 5days prior. Pt was started on BCTM for cellulitis however failed out 

patient treatment, PT had I&D in the ED after US showed a small fluid 

collection. While in the ED K+ was noted to be at 7. Pt was treated x2 and it 

is now improved.  I am consulted for ICU management.





Allergies and Home Medications


Allergies


Coded Allergies:  


     diazepam (Unverified  Allergy, Intermediate, RASH, 11/19/10)





Home Medications


Alprazolam 0.5 Mg Tablet, 0.5 MG PO TID, (Reported)


Aspirin 81 Mg Tablet.dr, 81 MG PO DAILY, (Reported)


Esomeprazole Magnesium 40 Mg Cap, 40 MG PO DAILY, (Reported)


Lisinopril 5 Mg Tablet, 5 MG PO DAILY, (Reported)


Metformin HCl 500 Mg Tablet, 500 MG PO BID WITH MEALS, (Reported)


Metoprolol Tartrate 25 Mg Tablet, 25 MG PO BID, (Reported)


Tramadol HCl/Acetaminophen 1 Each Tablet, 1 TAB PO QID, (Reported)





Past Medical-Social-Family Hx


Patient Social History


Alcohol Use:  Denies Use


Recreational Drug Use:  No


Smoking Status:  Never a Smoker


2nd Hand Smoke Exposure:  No


Recent Foreign Travel:  No


Contact w/Someone Who Travel:  No


Recent Infectious Disease Expo:  No


Recent Hopitalizations:  No





Immunizations Up To Date


Tetanus Booster (TDap):  Unknown


Date of Pneumonia Vaccine:  Oct 22, 2017


Date of Influenza Vaccine:  Oct 17, 2018





Seasonal Allergies


Seasonal Allergies:  No





Past Medical History


Surgeries:  Yes (SKIN CA REMOVAL)


Eye Surgery, Hysterectomy


Respiratory:  No


Cardiac:  Yes


Hypertension


Neurological:  No


Pregnant:  No


GYN History:  Hysterectomy


Genitourinary:  No


Gastrointestinal:  Yes


Gastroesophageal Reflux


Musculoskeletal:  No


Endocrine:  Yes


Diabetes, Non-Insulin dep


Cataract, Glaucoma


Cancer:  Yes


Skin


Psychosocial:  No


Integumentary:  No


Blood Disorders:  No


Adverse Reaction/Blood Tranf:  No





Family Medical History





Patient reports no known family medical history.


No Pertinent Family Hx





Review of Systems


Time Seen by Provider:  06:35


Constitutional:  Sweats, Weakness, Malaise; No: Fever, Chills, Other


Eyes:  No: Pain, Vision change, Conjunctivae inflammation, Eyelid inflammation, 

Other, Redness


ENT:  No: Ear pain, Ear discharge, Nose pain, Nose discharge, Nose congestion, 

Mouth pain, Mouth swelling, Throat pain, Throat swelling, Other


Respiratory:  Cough, Shortness of breath, SOB with excertion; No: Dry, Wheezing

, Hemoptysis, Pleuritic Pain, Sputum, Wheezing, Other


Gastrointestinal:  No: Nausea, Vomiting, Abdominal Pain, Diarrhea


Skin:  Other (cellulitis )





Sepsis Event


Evaluation


Height, Weight, BMI


Height: 5'5.00"


Weight: 157lbs. 9.0oz. 71.177970vt; 26.6 BMI


Method:Stated





Exam


Exam





Vital Signs








  Date Time  Temp Pulse Resp B/P (MAP) Pulse Ox O2 Delivery O2 Flow Rate FiO2


 


2/16/19 06:00  95 12 100/57 (71) 98 Room Air  


 


2/16/19 05:00  95 12 124/49 (74) 98 Room Air  


 


2/16/19 04:00  98 19 119/43 (68) 98 Room Air  


 


2/16/19 03:35 98.6     Room Air  


 


2/16/19 03:35     100 Room Air  


 


2/16/19 03:00  93 17 127/48 (74) 95 Room Air  


 


2/16/19 02:05      Room Air  


 


2/16/19 02:00  104 19 126/37 (66) 99 Nasal Cannula 2.00 


 


2/16/19 01:00  89      


 


2/16/19 01:00  86 16 99/37 (57) 98 Nasal Cannula 2.00 


 


2/16/19 00:50      Nasal Cannula 2.00 


 


2/16/19 00:00  96 12 120/57 (78) 99 Room Air  


 


2/15/19 23:50 98.1       


 


2/15/19 23:40     96 Room Air  


 


2/15/19 23:00  100 14 131/53 (79) 100 Room Air  


 


2/15/19 22:00  101 16 137/60 (85) 95 Room Air  


 


2/15/19 21:00  112 15 140/43 (75) 95 Room Air  


 


2/15/19 20:00  105 19 126/45 (72) 96 Room Air  


 


2/15/19 19:45 99.2 106 20 137/49 (78) 97 Room Air  


 


2/15/19 19:30     97 Room Air  


 


2/15/19 19:00  113      


 


2/15/19 19:00  112 17 136/45 (75) 95 Room Air  


 


2/15/19 18:00  111 26 143/52 (82) 99 Room Air  


 


2/15/19 17:00  115 13 151/53 (85) 100 Room Air  


 


2/15/19 16:45  111 15 145/51 (82) 100 Room Air  


 


2/15/19 16:30  112 15 145/57 (86) 100 Room Air  


 


2/15/19 16:15  109 19 149/55 (86) 100 Room Air  


 


2/15/19 16:15  109 19 149/55 (86) 100 Room Air  


 


2/15/19 16:00  110 13 131/88 (102) 99 Room Air  


 


2/15/19 16:00  110 13 131/88 (102) 99 Room Air  


 


2/15/19 15:59  111      


 


2/15/19 15:45 97.4 107 18 131/34 (66) 100 Room Air  


 


2/15/19 15:45 97.4 107 18 131/66 (87) 100 Room Air  


 


2/15/19 15:15  80 16 156/75 (102) 96 Room Air  


 


2/15/19 10:05 97.5 81 16 137/46 (76) 97 Room Air  














I & O 


 


 2/16/19





 07:00


 


Intake Total 3330 ml


 


Output Total 1750 ml


 


Balance 1580 ml








Height & Weight


Height: 5'5.00"


Weight: 157lbs. 9.0oz. [id number]; 26.6 BMI


Method:Stated


Capillary Refill:  Less Than 3 Seconds





Results


Lab


Laboratory Tests


2/15/19 11:00








2/15/19 12:05








2/15/19 17:49








2/16/19 03:30











Assessment/Plan


Assessment/Plan


Cellulitis with abscess s/p I&D


   -Pan cultures pending


   -continue Abx 


Hyperkalemia -improved 


   -S/p insulin, bicarb, kayexelate 


JOON with Metabolic acidosis nonanion gapped 


   -Monitor close


   -Bicarb gtt


   -Check ABG 


   -IVF and monitor 


Hypomag


   -replace


Anemia 


   -Monitor











GUEVARA PRESLEY DO Feb 16, 2019 06:32

## 2019-02-16 NOTE — HISTORY & PHYSICAL-HOSPITALIST
History of Present Illness


HPI/Chief Complaint


CC: Left lower leg cellulitis with abscess s/p I&D in ER





HPI: This is a very complex and frail 84yoWF of [facility] who presented to the ER w/

worsened left lower cellulitis after completing Bactrim last week after she was 

seen in ER for cellulitis and was found in the ER to have abscess so that was I&

D and cultures obtained and then potassium was found to be 7.0 so patient was 

treated for that condition and given IVF and placed in ICU for observation. 

Patient is doing very well. She likes to sleep upright in her chair due to 

severe kyphosis.


Source:  patient


Date Seen


19


Time Seen by a Provider:  11:30


Attending Physician


Lori Watson DO


[facility]


Referring Physician





Date of Admission


Feb 15, 2019 at 13:01





Home Medications & Allergies


Home Medications


Reviewed patient Home Medication Reconciliation performed by pharmacy 

medication reconciliations technician and/or nursing.


Patients Allergies have been reviewed.





Allergies





Allergies


Coded Allergies


  diazepam (Unverified Allergy, Intermediate, RASH, 11/19/10)








Past Medical-Social-Family Hx


Past Med/Social Hx:  Reviewed Nursing Past Med/Soc Hx, Reviewed and Corrections 

made


Patient Social History


Marrital Status:  


Employed/Student:  retired


Alcohol Use:  Denies Use


Recreational Drug Use:  No


Smoking Status:  Never a Smoker


2nd Hand Smoke Exposure:  No


Physical Abuse Screen:  No


Sexual Abuse:  No


Recent Foreign Travel:  No


Contact w/other who traveled:  No


Recent Hopitalizations:  No


Recent Infectious Disease Expo:  No





Immunizations Up To Date


Tetanus Booster (TDap):  Unknown


Date of Pneumonia Vaccine:  Oct 22, 2017


Date of Influenza Vaccine:  Oct 17, 2018





Seasonal Allergies


Seasonal Allergies:  No





Past Medical History


Surgeries:  Eye Surgery, Hysterectomy


Cardiac:  Hypertension


Pregnant:  No


Hysterectomy


Genitourinary:  Bladder Infection, Renal Failure


Gastrointestinal:  Gastroesophageal Reflux


Endocrine:  Diabetes, Non-Insulin dep


HEENT:  Cataract, Glaucoma


Cancer:  Skin


History of Blood Disorders:  No


Adverse Reaction to Blood Albarran:  No





Family History





Patient reports no known family medical history.


No Pertinent Family Hx





Review of Systems


Constitutional:  see HPI, weakness


EENTM:  no symptoms reported


Respiratory:  no symptoms reported


Cardiovascular:  no symptoms reported


Gastrointestinal:  no symptoms reported


Genitourinary:  no symptoms reported


Musculoskeletal:  no symptoms reported


Skin:  see HPI


Psychiatric/Neurological:  No Symptoms Reported


All Other Systems Reviewed


Negative Unless Noted:  Yes





Physical Exam


Physical Exam


Vital Signs





Vital Signs - First Documented








 2/15/19 2/16/19





 10:05 00:50


 


Temp 97.5 


 


Pulse 81 


 


Resp 16 


 


B/P (MAP) 137/46 (76) 


 


Pulse Ox 97 


 


O2 Delivery Room Air 


 


O2 Flow Rate  2.00





Capillary Refill : Less Than 3 Seconds


Height, Weight, BMI


Height: 5'5.00"


Weight: 157lbs. 9.0oz. 71.892414gs; 26.6 BMI


Method:Stated


General Appearance:  No Apparent Distress, WD/WN, Chronically ill


HEENT:  PERRL/EOMI, Normal ENT Inspection, Pharynx Normal


Neck:  Full Range of Motion, Normal Inspection, Non Tender, Supple


Respiratory:  Chest Non Tender, Lungs Clear, Normal Breath Sounds, No Accessory 

Muscle Use, No Respiratory Distress


Cardiovascular:  Regular Rate, Rhythm, No Edema


Extremity:  Normal Capillary Refill, Swelling (left leg)


Neurologic/Psychiatric:  Alert, Oriented x3, No Motor/Sensory Deficits, Normal 

Mood/Affect





Results


Results/Procedures


Labs


Laboratory Tests


2/15/19 11:00








2/15/19 12:05








2/15/19 17:49








19 03:30








Patient resulted labs reviewed.





Assessment/Plan


Admission Diagnosis


Assessment:


Left lower leg abscess s/p I&D


Hyperkalemia


ARF


CRI


DM


Chronic kyphosis





Plan:


Await Cx


Home meds


Monitor closely


Admission Status:  Inpatient Order (span 2 midnights)


Reason for Inpatient Admission:  


Abscess and failed PO abx left leg





Diagnosis/Problems


Diagnosis/Problems





(1) Abscess of leg


Status:  Acute


(2) Kyphosis


Status:  Chronic


Qualifiers:  


   Kyphosis type:  unspecified  Spinal region:  thoracolumbar  Qualified Codes:

  M40.205 - Unspecified kyphosis, thoracolumbar region


(3) Anemia


Status:  Chronic


Qualifiers:  


   Anemia type:  unspecified type  Qualified Codes:  D64.9 - Anemia, unspecified


(4) Cellulitis, leg


Status:  Acute


Qualifiers:  


   Laterality:  left  Qualified Codes:  L03.116 - Cellulitis of left lower limb


(5) Hyperkalemia


Status:  Acute


(6) Chronic renal disease


Status:  Chronic


Qualifiers:  


   Chronic kidney disease stage:  unspecified stage  Qualified Codes:  N18.9 - 

Chronic kidney disease, unspecified





Clinical Quality Measures


DVT/VTE Risk/Contraindication:


Risk Factor Score Per Nursin


RFS Level Per Nursing on Admit:  4+=Very High











LORI WATSON DO 2019 11:11

## 2019-02-16 NOTE — NUR
Patient refused ABG at this time. Dr Shaa did speak to the patient and explained procedure 
and reason for test. Patient continued to refuse test.

## 2019-02-16 NOTE — DIAGNOSTIC IMAGING REPORT
INDICATION: Hyperkalemia. Followup.



COMPARISON: 02/15/2019



FINDINGS: Single frontal view of the chest demonstrates normal

heart size and pulmonary vascularity. The lungs are well aerated

and clear. No large pleural effusion or pneumothorax is seen. The

visualized osseous structures show no acute abnormalities.

Left-sided subclavian central venous catheter is noted with tip

in the SVC.



IMPRESSION: 

1. No acute cardiopulmonary process.  



Dictated by: 



  Dictated on workstation # [id number]

## 2019-02-17 VITALS — SYSTOLIC BLOOD PRESSURE: 149 MMHG | DIASTOLIC BLOOD PRESSURE: 71 MMHG

## 2019-02-17 VITALS — DIASTOLIC BLOOD PRESSURE: 57 MMHG | SYSTOLIC BLOOD PRESSURE: 132 MMHG

## 2019-02-17 VITALS — SYSTOLIC BLOOD PRESSURE: 128 MMHG | DIASTOLIC BLOOD PRESSURE: 60 MMHG

## 2019-02-17 VITALS — SYSTOLIC BLOOD PRESSURE: 139 MMHG | DIASTOLIC BLOOD PRESSURE: 65 MMHG

## 2019-02-17 VITALS — DIASTOLIC BLOOD PRESSURE: 54 MMHG | SYSTOLIC BLOOD PRESSURE: 151 MMHG

## 2019-02-17 LAB
BASOPHILS # BLD AUTO: 0 10^3/UL (ref 0–0.1)
BASOPHILS NFR BLD AUTO: 0 % (ref 0–10)
BUN/CREAT SERPL: 16
CALCIUM SERPL-MCNC: 8.4 MG/DL (ref 8.5–10.1)
CHLORIDE SERPL-SCNC: 110 MMOL/L (ref 98–107)
CO2 SERPL-SCNC: 25 MMOL/L (ref 21–32)
CREAT SERPL-MCNC: 1.06 MG/DL (ref 0.6–1.3)
EOSINOPHIL # BLD AUTO: 0.2 10^3/UL (ref 0–0.3)
EOSINOPHIL NFR BLD AUTO: 2 % (ref 0–10)
ERYTHROCYTE [DISTWIDTH] IN BLOOD BY AUTOMATED COUNT: 13.5 % (ref 10–14.5)
GFR SERPLBLD BASED ON 1.73 SQ M-ARVRAT: 49 ML/MIN
GLUCOSE SERPL-MCNC: 150 MG/DL (ref 70–105)
HCT VFR BLD CALC: 26 % (ref 35–52)
HGB BLD-MCNC: 8.3 G/DL (ref 11.5–16)
LYMPHOCYTES # BLD AUTO: 2.8 X 10^3 (ref 1–4)
LYMPHOCYTES NFR BLD AUTO: 36 % (ref 12–44)
MAGNESIUM SERPL-MCNC: 1.7 MG/DL (ref 1.8–2.4)
MANUAL DIFFERENTIAL PERFORMED BLD QL: NO
MCH RBC QN AUTO: 31 PG (ref 25–34)
MCHC RBC AUTO-ENTMCNC: 32 G/DL (ref 32–36)
MCV RBC AUTO: 96 FL (ref 80–99)
MONOCYTES # BLD AUTO: 0.5 X 10^3 (ref 0–1)
MONOCYTES NFR BLD AUTO: 6 % (ref 0–12)
NEUTROPHILS # BLD AUTO: 4.4 X 10^3 (ref 1.8–7.8)
NEUTROPHILS NFR BLD AUTO: 55 % (ref 42–75)
PLATELET # BLD: 315 10^3/UL (ref 130–400)
PMV BLD AUTO: 9.2 FL (ref 7.4–10.4)
POTASSIUM SERPL-SCNC: 3.7 MMOL/L (ref 3.6–5)
SODIUM SERPL-SCNC: 144 MMOL/L (ref 135–145)
WBC # BLD AUTO: 7.9 10^3/UL (ref 4.3–11)

## 2019-02-17 RX ADMIN — SODIUM CHLORIDE SCH MLS/HR: 900 INJECTION, SOLUTION INTRAVENOUS at 05:29

## 2019-02-17 RX ADMIN — ALPRAZOLAM SCH MG: 0.5 TABLET ORAL at 22:19

## 2019-02-17 RX ADMIN — METOPROLOL TARTRATE SCH MG: 25 TABLET, FILM COATED ORAL at 22:12

## 2019-02-17 RX ADMIN — POLYETHYLENE GLYCOL (3350) SCH GM: 17 POWDER, FOR SOLUTION ORAL at 09:59

## 2019-02-17 RX ADMIN — ENOXAPARIN SODIUM SCH MG: 100 INJECTION SUBCUTANEOUS at 11:49

## 2019-02-17 RX ADMIN — ALPRAZOLAM PRN MG: 0.25 TABLET ORAL at 22:12

## 2019-02-17 RX ADMIN — INSULIN ASPART SCH UNIT: 100 INJECTION, SOLUTION INTRAVENOUS; SUBCUTANEOUS at 06:09

## 2019-02-17 RX ADMIN — VANCOMYCIN HYDROCHLORIDE SCH MLS/HR: 500 INJECTION, POWDER, LYOPHILIZED, FOR SOLUTION INTRAVENOUS at 17:12

## 2019-02-17 RX ADMIN — INSULIN ASPART SCH UNIT: 100 INJECTION, SOLUTION INTRAVENOUS; SUBCUTANEOUS at 11:45

## 2019-02-17 RX ADMIN — INSULIN ASPART SCH UNIT: 100 INJECTION, SOLUTION INTRAVENOUS; SUBCUTANEOUS at 16:05

## 2019-02-17 RX ADMIN — INSULIN ASPART SCH UNIT: 100 INJECTION, SOLUTION INTRAVENOUS; SUBCUTANEOUS at 22:13

## 2019-02-17 RX ADMIN — POLYETHYLENE GLYCOL (3350) SCH GM: 17 POWDER, FOR SOLUTION ORAL at 22:14

## 2019-02-17 NOTE — NUR
Patient to room 407 at this time per recliner without incident. Patient report given to 
Becky SIMMS per telephone. Patient personal belongings sent with her at time of transfer.

## 2019-02-17 NOTE — PROGRESS NOTE-HOSPITALIST
Subjective


HPI/CC On Admission


Date Seen by Provider:  2019


Time Seen by Provider:  10:30


CC: Left lower leg cellulitis with abscess s/p I&D in ER





HPI: This is a very complex and frail 84yoWF of [facility] who presented to the ER w/

worsened left lower cellulitis after completing Bactrim last week after she was 

seen in ER for cellulitis and was found in the ER to have abscess so that was I&

D and cultures obtained and then potassium was found to be 7.0 so patient was 

treated for that condition and given IVF and placed in ICU for observation. 

Patient is doing very well. She likes to sleep upright in her chair due to 

severe kyphosis.


Subjective/Events-last exam


Home meds will be reconciled and restarted


Pain is improved


MRSA from I&D left lower leg


Cellulitis improved


DC Zosyn since ID back


Lovenox 40mg SQ initiated for DVT Px





Review of Systems


General:  Fatigue


Musculoskeletal:  leg pain





Focused Exam


Lactate Level


2/15/19 11:00: Lactic Acid Level 1.37








Objective


Exam


Vital Signs





Vital Signs








  Date Time  Temp Pulse Resp B/P (MAP) Pulse Ox O2 Delivery O2 Flow Rate FiO2


 


19 09:00     100 Room Air  


 


19 08:00 98.3 70 18 149/71 (97)    


 


19 02:00       2.00 





Capillary Refill : Less Than 3 Seconds


General Appearance:  No Apparent Distress, WD/WN


HEENT:  PERRL/EOMI, Normal ENT Inspection, Pharynx Normal


Neck:  Full Range of Motion, Normal Inspection, Non Tender, Supple


Respiratory:  Lungs Clear, Normal Breath Sounds, No Accessory Muscle Use, No 

Respiratory Distress


Cardiovascular:  Regular Rate, Rhythm, No Murmur


Extremity:  Normal Capillary Refill, Normal Range of Motion, Other (left lower 

extremity wound with packing.  There is still some mild redness/erythema of the 

left lower extremity but does appear to be improving.  Tender to palpation.)


Neurologic/Psychiatric:  Alert, Oriented x3


Skin:  Normal Color, Warm/Dry, Other (See extremity exam)





Results/Procedures


Lab


Laboratory Tests


19 03:09








Patient resulted labs reviewed.





Assessment/Plan


Assessment and Plan


Assess & Plan/Chief Complaint


Assessment:


Left lower leg abscess s/p I&D now with MRSA on Cx so DC Zosyn and maintained 

on Vanc which was started on admit


Hyperkalemia resolved


ARF back to baseline


CRI


DM


Chronic kyphosis





Plan:


Dc Zosyn


Maintain Vanc


Home meds


Monitor closely


Lovenox





Diagnosis/Problems


Diagnosis/Problems





(1) Abscess of leg


Status:  Acute


(2) MRSA infection


Status:  Acute


(3) Kyphosis


Status:  Chronic


Qualifiers:  


   Kyphosis type:  unspecified  Spinal region:  thoracolumbar  Qualified Codes:

  M40.205 - Unspecified kyphosis, thoracolumbar region


(4) Anemia


Status:  Chronic


Qualifiers:  


   Anemia type:  unspecified type  Qualified Codes:  D64.9 - Anemia, unspecified


(5) Cellulitis, leg


Status:  Acute


Qualifiers:  


   Laterality:  left  Qualified Codes:  L03.116 - Cellulitis of left lower limb


(6) Hyperkalemia


Status:  Acute


(7) Chronic renal disease


Status:  Chronic


Qualifiers:  


   Chronic kidney disease stage:  unspecified stage  Qualified Codes:  N18.9 - 

Chronic kidney disease, unspecified





Clinical Quality Measures


DVT/VTE Risk/Contraindication:


Risk Factor Score Per Nursin


RFS Level Per Nursing on Admit:  4+=Very High











JOHANNA CARVALHO DO 2019 11:53

## 2019-02-17 NOTE — NUR
PHONED [facility] TO CLARIFY HOME MEDICATIONS. DR. CARVALHO INFORMED HOME MEDS ARE READY 
TO REVIEW.

## 2019-02-17 NOTE — PULMONARY PROGRESS NOTE
Subjective


Time Seen by a Provider:  06:32


Subjective/Events-last exam


No complications noted.





Sepsis Event


Evaluation


Height, Weight, BMI


Height: 5'5.00"


Weight: 157lbs. 15.7oz. 71.086113vr; 26.6 BMI


Method:Stated





Focused Exam


Lactate Level


2/15/19 11:00: Lactic Acid Level 1.37





Exam


Exam





Vital Signs








  Date Time  Temp Pulse Resp B/P (MAP) Pulse Ox O2 Delivery O2 Flow Rate FiO2


 


2/17/19 03:03 98.0 71 20 139/65 (89) 98 Room Air  


 


2/16/19 23:10 98.7 68 18 136/57 (83) 98 Room Air  


 


2/16/19 21:00     100 Room Air  


 


2/16/19 19:15 98.1 84 20 133/64 (87) 100 Room Air  


 


2/16/19 17:00  86 14 134/59 (84) 98 Room Air  


 


2/16/19 16:00     100 Room Air  


 


2/16/19 12:58  94      


 


2/16/19 12:00     100 Room Air  


 


2/16/19 12:00  81 12 118/46 (70) 99 Room Air  


 


2/16/19 10:26  96 12 125/63 (83) 99 Room Air  


 


2/16/19 09:00  105 18 124/62 (82) 97 Room Air  


 


2/16/19 08:02  93 23 134/58 (83) 100 Room Air  


 


2/16/19 08:01     100 Room Air  


 


2/16/19 08:00  82 19 134/58 (83) 100 Room Air  


 


2/16/19 07:44  82      


 


2/16/19 07:00 98.5 89 19 149/72 (97) 99   














I & O 


 


 2/17/19





 07:00


 


Intake Total 2415 ml


 


Output Total 1400 ml


 


Balance 1015 ml








Height & Weight


Height: 5'5.00"


Weight: 157lbs. 15.7oz. 71.576024oi; 26.6 BMI


Method:Stated


General Appearance:  No Apparent Distress, WD/WN, Chronically ill


HEENT:  PERRL/EOMI, Normal ENT Inspection, Pharynx Normal


Neck:  Full Range of Motion, Normal Inspection, Non Tender, Supple


Respiratory:  Chest Non Tender, Lungs Clear, Normal Breath Sounds, No Accessory 

Muscle Use, No Respiratory Distress


Cardiovascular:  Regular Rate, Rhythm, No Edema


Capillary Refill:  Less Than 3 Seconds


Gastrointestinal:  normal bowel sounds, non tender, soft


Extremity:  Normal Capillary Refill, Swelling (left leg)


Neurologic/Psychiatric:  Alert, Oriented x3, No Motor/Sensory Deficits, Normal 

Mood/Affect


Skin:  Normal Color, Warm/Dry





Results


Lab


Laboratory Tests


2/15/19 11:00








2/15/19 12:05








2/15/19 17:49








2/16/19 03:30








2/17/19 03:09











Assessment/Plan


Assessment/Plan


Cellulitis with abscess s/p I&D


   -Pan cultures pending


   -continue Abx 


Hyperkalemia -resolved 


   -S/p insulin, D/C bicarb gtt, kayexelate 


JOON with Metabolic acidosis nonanion gapped - resolved 


   -Monitor close


   -Bicarb gtt


   -Check ABG 


   -IVF and monitor 


Anemia 


   -Monitor








I am going to sign off. Please call with any questions or concerns. Pt is on RA 

and CXR shows no acute cardiopulmonary process.











GUEVARA PRESLYE DO Feb 17, 2019 06:32

## 2019-02-17 NOTE — NUR
JAYDE PANCHAL ON FLOOR AND GAVE VERBAL ORDER FOR BID DRESSING CHANGES. CLEANSE NS, PACK 
WITH IODOFORM, GUAZE, TAPE. THIS RN CHANGED DRESSING AT THIS TIME.

## 2019-02-17 NOTE — NUR
REPORT RECEIVED FROM MENDEZ DU. ASSUME CARE OF PT AT THIS PT. PT INTRUDED TO ROOM, PERSONAL 
BELONGINGS WITH PT. PT VOICES NO COMPLAINTS AT THIS TIME.

## 2019-02-17 NOTE — PROGRESS NOTE (SOAP)
Subjective


Date Seen by a Provider:  2019


Time Seen by a Provider:  10:10


Subjective/Events-last exam


Patient seen with Dr. Saldana.  Patient reports doing well. No N/V. No Fever/

chills. Tolerating diet.  still complains of pain of the left lower extremity.





Focused Exam


Lactate Level


2/15/19 11:00: Lactic Acid Level 1.37





Objective


Exam





Vital Signs








  Date Time  Temp Pulse Resp B/P (MAP) Pulse Ox O2 Delivery O2 Flow Rate FiO2


 


19 08:00 98.3 70 18 149/71 (97) 97 Room Air  


 


19 03:03 98.0 71 20 139/65 (89) 98 Room Air  


 


19 23:10 98.7 68 18 136/57 (83) 98 Room Air  


 


19 21:00     100 Room Air  


 


19 19:15 98.1 84 20 133/64 (87) 100 Room Air  


 


19 17:00  86 14 134/59 (84) 98 Room Air  


 


19 16:00     100 Room Air  


 


19 12:58  94      


 


19 12:00     100 Room Air  


 


19 12:00  81 12 118/46 (70) 99 Room Air  














I & O 


 


 19





 06:59


 


Intake Total 3515 ml


 


Output Total 1400 ml


 


Balance 2115 ml





Capillary Refill : Less Than 3 Seconds


General Appearance:  No Apparent Distress, WD/WN


Neck:  Full Range of Motion, Normal Inspection, Non Tender, Supple


Respiratory:  Lungs Clear, Normal Breath Sounds, No Accessory Muscle Use, No 

Respiratory Distress


Cardiovascular:  Regular Rate, Rhythm, No Murmur


Gastrointestinal:  normal bowel sounds, non tender, soft


Extremity:  Normal Capillary Refill, Normal Range of Motion, Other (left lower 

extremity wound with packing.  There is still some mild redness/erythema of the 

left lower extremity but does appear to be improving.  Tender to palpation.)


Neurologic/Psychiatric:  Alert, Oriented x3


Skin:  Normal Color, Warm/Dry, Other (See extremity exam)





Results


Lab


Laboratory Tests


19 13:33: Glucometer 272H


19 17:08: Glucometer 114H


19 21:16: Glucometer 157H


19 03:09: 


White Blood Count 7.9, Red Blood Count 2.71L, Hemoglobin 8.3L, Hematocrit 26L, 

Mean Corpuscular Volume 96, Mean Corpuscular Hemoglobin 31, Mean Corpuscular 

Hemoglobin Concent 32, Red Cell Distribution Width 13.5, Platelet Count 315, 

Mean Platelet Volume 9.2, Neutrophils (%) (Auto) 55, Lymphocytes (%) (Auto) 36, 

Monocytes (%) (Auto) 6, Eosinophils (%) (Auto) 2, Basophils (%) (Auto) 0, 

Neutrophils # (Auto) 4.4, Lymphocytes # (Auto) 2.8, Monocytes # (Auto) 0.5, 

Eosinophils # (Auto) 0.2, Basophils # (Auto) 0.0, Sodium Level 144, Potassium 

Level 3.7, Chloride Level 110H, Carbon Dioxide Level 25, Anion Gap 9, Blood 

Urea Nitrogen 17, Creatinine 1.06, Estimat Glomerular Filtration Rate 49, BUN/

Creatinine Ratio 16, Glucose Level 150H, Calcium Level 8.4L, Magnesium Level 

1.7L


19 05:09: Glucometer 175H





Microbiology


2/15/19 Blood Culture - Preliminary, Resulted


          No growth


2/15/19 Gram Stain - Final, Resulted


          


2/15/19 Wound Culture - Preliminary, Resulted


          Staphylococcus species





Assessment/Plan


Assessment/Plan


Assess & Plan/Chief Complaint


An 84-year-old female with cellulitis and early abscess status post incision 

and drainage of the left lower extremity. 


Continue with medical management with IV abx, pain and nausea medication.


Dressing changes BID.


Wound culture staph on vancomycin and zosyn.


Will continue to monitor progress.





Clinical Quality Measures


DVT/VTE Risk/Contraindication:


Risk Factor Score Per Nursin


RFS Level Per Nursing on Admit:  4+=Very High











ABBEY MCGUIRE APRN 2019 11:22

## 2019-02-18 VITALS — SYSTOLIC BLOOD PRESSURE: 118 MMHG | DIASTOLIC BLOOD PRESSURE: 71 MMHG

## 2019-02-18 VITALS — SYSTOLIC BLOOD PRESSURE: 110 MMHG | DIASTOLIC BLOOD PRESSURE: 56 MMHG

## 2019-02-18 VITALS — DIASTOLIC BLOOD PRESSURE: 63 MMHG | SYSTOLIC BLOOD PRESSURE: 128 MMHG

## 2019-02-18 VITALS — SYSTOLIC BLOOD PRESSURE: 138 MMHG | DIASTOLIC BLOOD PRESSURE: 60 MMHG

## 2019-02-18 VITALS — DIASTOLIC BLOOD PRESSURE: 64 MMHG | SYSTOLIC BLOOD PRESSURE: 146 MMHG

## 2019-02-18 VITALS — DIASTOLIC BLOOD PRESSURE: 63 MMHG | SYSTOLIC BLOOD PRESSURE: 142 MMHG

## 2019-02-18 VITALS — DIASTOLIC BLOOD PRESSURE: 54 MMHG | SYSTOLIC BLOOD PRESSURE: 131 MMHG

## 2019-02-18 LAB
BASOPHILS # BLD AUTO: 0 10^3/UL (ref 0–0.1)
BASOPHILS NFR BLD AUTO: 0 % (ref 0–10)
BUN/CREAT SERPL: 15
CALCIUM SERPL-MCNC: 8.4 MG/DL (ref 8.5–10.1)
CHLORIDE SERPL-SCNC: 108 MMOL/L (ref 98–107)
CO2 SERPL-SCNC: 25 MMOL/L (ref 21–32)
CREAT SERPL-MCNC: 0.95 MG/DL (ref 0.6–1.3)
EOSINOPHIL # BLD AUTO: 0.2 10^3/UL (ref 0–0.3)
EOSINOPHIL NFR BLD AUTO: 3 % (ref 0–10)
ERYTHROCYTE [DISTWIDTH] IN BLOOD BY AUTOMATED COUNT: 13.3 % (ref 10–14.5)
GFR SERPLBLD BASED ON 1.73 SQ M-ARVRAT: 56 ML/MIN
GLUCOSE SERPL-MCNC: 97 MG/DL (ref 70–105)
HCT VFR BLD CALC: 27 % (ref 35–52)
HGB BLD-MCNC: 8.4 G/DL (ref 11.5–16)
LYMPHOCYTES # BLD AUTO: 2.7 X 10^3 (ref 1–4)
LYMPHOCYTES NFR BLD AUTO: 32 % (ref 12–44)
MAGNESIUM SERPL-MCNC: 1.6 MG/DL (ref 1.8–2.4)
MANUAL DIFFERENTIAL PERFORMED BLD QL: NO
MCH RBC QN AUTO: 31 PG (ref 25–34)
MCHC RBC AUTO-ENTMCNC: 32 G/DL (ref 32–36)
MCV RBC AUTO: 97 FL (ref 80–99)
MONOCYTES # BLD AUTO: 0.6 X 10^3 (ref 0–1)
MONOCYTES NFR BLD AUTO: 7 % (ref 0–12)
NEUTROPHILS # BLD AUTO: 5 X 10^3 (ref 1.8–7.8)
NEUTROPHILS NFR BLD AUTO: 59 % (ref 42–75)
PLATELET # BLD: 293 10^3/UL (ref 130–400)
PMV BLD AUTO: 9.1 FL (ref 7.4–10.4)
POTASSIUM SERPL-SCNC: 3.8 MMOL/L (ref 3.6–5)
SODIUM SERPL-SCNC: 141 MMOL/L (ref 135–145)
WBC # BLD AUTO: 8.5 10^3/UL (ref 4.3–11)

## 2019-02-18 RX ADMIN — INSULIN ASPART SCH UNIT: 100 INJECTION, SOLUTION INTRAVENOUS; SUBCUTANEOUS at 21:30

## 2019-02-18 RX ADMIN — ALPRAZOLAM SCH MG: 0.5 TABLET ORAL at 08:56

## 2019-02-18 RX ADMIN — POLYETHYLENE GLYCOL (3350) SCH GM: 17 POWDER, FOR SOLUTION ORAL at 21:30

## 2019-02-18 RX ADMIN — POLYETHYLENE GLYCOL (3350) SCH GM: 17 POWDER, FOR SOLUTION ORAL at 08:57

## 2019-02-18 RX ADMIN — INSULIN ASPART SCH UNIT: 100 INJECTION, SOLUTION INTRAVENOUS; SUBCUTANEOUS at 16:28

## 2019-02-18 RX ADMIN — INSULIN ASPART SCH UNIT: 100 INJECTION, SOLUTION INTRAVENOUS; SUBCUTANEOUS at 11:54

## 2019-02-18 RX ADMIN — PANTOPRAZOLE SODIUM SCH MG: 40 TABLET, DELAYED RELEASE ORAL at 08:56

## 2019-02-18 RX ADMIN — ALPRAZOLAM SCH MG: 0.5 TABLET ORAL at 13:21

## 2019-02-18 RX ADMIN — METOPROLOL TARTRATE SCH MG: 25 TABLET, FILM COATED ORAL at 08:56

## 2019-02-18 RX ADMIN — ENOXAPARIN SODIUM SCH MG: 100 INJECTION SUBCUTANEOUS at 11:15

## 2019-02-18 RX ADMIN — LISINOPRIL SCH MG: 5 TABLET ORAL at 08:56

## 2019-02-18 RX ADMIN — ACETAMINOPHEN PRN MG: 500 TABLET ORAL at 00:27

## 2019-02-18 RX ADMIN — INSULIN ASPART SCH UNIT: 100 INJECTION, SOLUTION INTRAVENOUS; SUBCUTANEOUS at 06:12

## 2019-02-18 RX ADMIN — ALPRAZOLAM SCH MG: 0.5 TABLET ORAL at 21:52

## 2019-02-18 RX ADMIN — VANCOMYCIN HYDROCHLORIDE SCH MLS/HR: 500 INJECTION, POWDER, LYOPHILIZED, FOR SOLUTION INTRAVENOUS at 16:38

## 2019-02-18 RX ADMIN — METOPROLOL TARTRATE SCH MG: 25 TABLET, FILM COATED ORAL at 21:52

## 2019-02-18 NOTE — PROGRESS NOTE (SOAP)
Subjective


Subjective/Events-last exam


85 yo F with abscess and associated cellulitis to LLE. Improving today. Wound 

packed and continues to drain purulent material. Tolerating PO diet. Denies any 

fever or chills.


Review of Systems


Date Seen by Provider:  2019


Time Seen by Provider:  10:25


General:  No Chills; Fatigue


Pulmonary:  No Dyspnea, No Cough


Cardiovascular:  Edema; No: Chest Pain, Palpitations


Musculoskeletal:  leg pain


Neurological:  Weakness





Objective


Exam


Last Set of Vital Signs





Vital Signs








  Date Time  Temp Pulse Resp B/P (MAP) Pulse Ox O2 Delivery O2 Flow Rate FiO2


 


19 16:03 97.1 65 20 128/63 (84) 98 Room Air  


 


19 02:00       2.00 





Capillary Refill : Less Than 3 Seconds


I&O











Intake and Output 


 


 19





 00:00


 


Intake Total 2730 ml


 


Output Total 950 ml


 


Balance 1780 ml


 


 


 


Intake Oral 1390 ml


 


IV Total 1340 ml


 


Output Urine Total 950 ml


 


# Voids 5


 


# Bowel Movements 3








General:  Alert, Oriented X3, Cooperative, No Acute Distress


HEENT:  Mucous Memb Moist/Pink


Lungs:  Clear to Auscultation, Normal Air Movement


Heart:  Regular Rate, No Murmurs


Abdomen:  Normal Bowel Sounds, Soft, No Tenderness


Extremities:  Other (2+ pitting edema bilaterally, LLE wound with packing and 

purulent drainage,  Increase warmth and redness to LLE)


Neuro:  Cranial Nerves 3-12 NL


Psych/Mental Status:  Mental Status NL, Mood NL





Results/Procedures


Lab


Laboratory Tests


19 20:46: Glucometer 189H


19 04:40: 


White Blood Count 8.5, Red Blood Count 2.74L, Hemoglobin 8.4L, Hematocrit 27L, 

Mean Corpuscular Volume 97, Mean Corpuscular Hemoglobin 31, Mean Corpuscular 

Hemoglobin Concent 32, Red Cell Distribution Width 13.3, Platelet Count 293, 

Mean Platelet Volume 9.1, Neutrophils (%) (Auto) 59, Lymphocytes (%) (Auto) 32, 

Monocytes (%) (Auto) 7, Eosinophils (%) (Auto) 3, Basophils (%) (Auto) 0, 

Neutrophils # (Auto) 5.0, Lymphocytes # (Auto) 2.7, Monocytes # (Auto) 0.6, 

Eosinophils # (Auto) 0.2, Basophils # (Auto) 0.0, Sodium Level 141, Potassium 

Level 3.8, Chloride Level 108H, Carbon Dioxide Level 25, Anion Gap 8, Blood 

Urea Nitrogen 14, Creatinine 0.95, Estimat Glomerular Filtration Rate 56, BUN/

Creatinine Ratio 15, Glucose Level 97, Calcium Level 8.4L, Magnesium Level 1.6L


19 11:42: Glucometer 109


19 16:06: Glucometer 167H





Microbiology


2/15/19 Blood Culture - Preliminary, Resulted


          No growth


2/15/19 Gram Stain - Final, Complete


          


2/15/19 Wound Culture - Final, Complete


          Staphylococcus aureus





Assessment/Plan


Assessment/Plan





(1) Abscess of left lower extremity


Status:  Acute


Assessment & Plan:  - Growing MRSA, will d/c on antibiotics





(2) MRSA infection


Status:  Acute


(3) Normocytic anemia


Status:  Chronic


Assessment & Plan:  - Will need workup as outpatient





(4) Discharge planning issues


Assessment & Plan:  - Plan to d/c home tomorrow








Clinical Quality Measures


DVT/VTE Risk/Contraindication:


Risk Factor Score Per Nursin


RFS Level Per Nursing on Admit:  4+=Very High











VIN STEEL MD 2019 19:08

## 2019-02-19 VITALS — DIASTOLIC BLOOD PRESSURE: 70 MMHG | SYSTOLIC BLOOD PRESSURE: 161 MMHG

## 2019-02-19 VITALS — SYSTOLIC BLOOD PRESSURE: 161 MMHG | DIASTOLIC BLOOD PRESSURE: 70 MMHG

## 2019-02-19 LAB
BUN/CREAT SERPL: 13
CALCIUM SERPL-MCNC: 8.4 MG/DL (ref 8.5–10.1)
CHLORIDE SERPL-SCNC: 109 MMOL/L (ref 98–107)
CO2 SERPL-SCNC: 24 MMOL/L (ref 21–32)
CREAT SERPL-MCNC: 0.99 MG/DL (ref 0.6–1.3)
GFR SERPLBLD BASED ON 1.73 SQ M-ARVRAT: 53 ML/MIN
GLUCOSE SERPL-MCNC: 129 MG/DL (ref 70–105)
MAGNESIUM SERPL-MCNC: 1.6 MG/DL (ref 1.8–2.4)
POTASSIUM SERPL-SCNC: 3.7 MMOL/L (ref 3.6–5)
SODIUM SERPL-SCNC: 143 MMOL/L (ref 135–145)

## 2019-02-19 RX ADMIN — METOPROLOL TARTRATE SCH MG: 25 TABLET, FILM COATED ORAL at 08:37

## 2019-02-19 RX ADMIN — INSULIN ASPART SCH UNIT: 100 INJECTION, SOLUTION INTRAVENOUS; SUBCUTANEOUS at 06:39

## 2019-02-19 RX ADMIN — ALPRAZOLAM SCH MG: 0.5 TABLET ORAL at 08:37

## 2019-02-19 RX ADMIN — PANTOPRAZOLE SODIUM SCH MG: 40 TABLET, DELAYED RELEASE ORAL at 08:37

## 2019-02-19 RX ADMIN — INSULIN ASPART SCH UNIT: 100 INJECTION, SOLUTION INTRAVENOUS; SUBCUTANEOUS at 11:20

## 2019-02-19 RX ADMIN — ENOXAPARIN SODIUM SCH MG: 100 INJECTION SUBCUTANEOUS at 11:33

## 2019-02-19 RX ADMIN — LISINOPRIL SCH MG: 5 TABLET ORAL at 08:37

## 2019-02-19 RX ADMIN — POLYETHYLENE GLYCOL (3350) SCH GM: 17 POWDER, FOR SOLUTION ORAL at 08:37

## 2019-02-19 NOTE — NUR
DISCHARGE INSTRUCTIONS GIVEN TO PATIENT WITH TIME ALLOWED FOR QUESTIONS. PATIENT HAS NO 
COMPLAINTS OF PAIN AT THIS TIME. CENTRAL LINE REMOVED WITH CATHETER INTACT. PRESSURE AND 
DRESSING APPLIED. PATIENT LEFT ROOM VIA WHEELCHAIR ACCOMPANIED BY STAFF, CAREGIVER, AND 
. LEFT IN PRIVATE VEHICLE.

## 2019-02-19 NOTE — DISCHARGE SUMMARY
Diagnosis/Chief Complaint


Date of Admission


Feb 15, 2019 at 13:01


Date of Discharge





Discharge Diagnosis


Problems/Diagnosis:  


(1) Abscess of left lower extremity


Assessment & Plan:  - Growing MRSA, will d/c on antibiotics


Status:  Acute


(2) MRSA infection


Status:  Acute


(3) Normocytic anemia


Assessment & Plan:  - Will need workup as outpatient


Status:  Chronic


(4) Discharge planning issues


Assessment & Plan:  - Plan to d/c home tomorrow








Discharge Summary-Simple/Stand


Consultations





Discharge Physical Examination


Allergies:  


Coded Allergies:  


     diazepam (Unverified  Allergy, Intermediate, RASH, 11/19/10)


Vitals & I&Os





Vital Sign - Last 12Hours








  Date Time  Temp Pulse Resp B/P (MAP) Pulse Ox O2 Delivery O2 Flow Rate FiO2


 


19 09:00      Room Air  


 


19 08:00 98.5 73 18 161/70 (100) 96   


 


19 02:00       2.00 














Intake and Output 


 


 19





 00:00


 


Intake Total 1350 ml


 


Output Total 900 ml


 


Balance 450 ml











Hospital Course


See final discharge diagnosis.





Discharge


Instructions to patient/family


Please see electronic discharge instructions given to patient.


Discharge Medications


Reviewed and agree with Discharge Medication list on patient's Discharge 

Instruction sheet





Clinical Quality Measures


DVT/VTE Risk/Contraindication:


Risk Factor Score Per Nursin


RFS Level Per Nursing on Admit:  4+=Very High











VIN STEEL MD 2019 10:37

## 2019-02-19 NOTE — DISCHARGE INSTRUCTIONS
Discharge [facility]


Discharge Medications


New, Converted or Re-Newed RX:  Transmitted to Pharmacy


New Medications:  


Doxycycline Hyclate (Doxycycline Hyclate) 100 Mg Capsule


100 MG PO BID, #14 CAP





 


Continued Medications:  


Albuterol Sulfate (Ventolin Hfa) 1 Puff Puff


2 PUFF INH Q4H PRN for SHORTNESS OF BREATH, PUFF


1 PUFF = 90 MCG


Alprazolam (Alprazolam) 0.5 Mg Tablet


0.5 MG PO TID





Glimepiride (Glimepiride) 4 Mg Tablet


4 MG PO DAILY





Lisinopril (Lisinopril) 5 Mg Tablet


5 MG PO DAILY





Metoprolol Tartrate (Metoprolol Tartrate) 25 Mg Tablet


25 MG PO BID





Omeprazole (Omeprazole) 40 Mg Capsule.dr


40 MG PO DAILY, CAP





 


Discontinued Medications:  


Esomeprazole Magnesium (Nexium) 40 Mg Cap


40 MG PO DAILY





Sulfamethoxazole/Trimethoprim (Sulfamethoxazole-Tmp Ds Tablet) 1 Each Tablet


1 TAB PO BID for 10 Days, TAB


10 DAY SUPPLY FILLED 2-8-19








Patient Instructions


Goal/Follow Up Appt:  


You have an appt with Dr Rangel on Tuesday 2/26 @ 0940 AM for hospital


followup


Patient Instructions:  


- Make sure to complete your antibiotics





Activity & Diet


Discharge Diet:  ADA Diet


Activity as Tolerated:  Yes





Copy


Copies To 1:   YULISA RANGEL MD, HOLLY R MD Feb 19, 2019 10:44

## 2021-07-01 NOTE — NUR
DR. CARVALHO ON FLOOR AND INFORMED OF POSITIVE WOUND CULTURE FOR MRSA.  GAVE VERBAL ORDER 
TO DISCONTINUE ZOSYN AND CONTACT PRECAUTIONS. HOUSE SUPERVISOR NOTIFIED BY CHONG GREEN/TALYA. Azithromycin Counseling:  I discussed with the patient the risks of azithromycin including but not limited to GI upset, allergic reaction, drug rash, diarrhea, and yeast infections.